# Patient Record
Sex: FEMALE | Race: WHITE | Employment: FULL TIME | URBAN - METROPOLITAN AREA
[De-identification: names, ages, dates, MRNs, and addresses within clinical notes are randomized per-mention and may not be internally consistent; named-entity substitution may affect disease eponyms.]

---

## 2019-08-16 ENCOUNTER — ROUTINE PRENATAL (OUTPATIENT)
Dept: OBGYN CLINIC | Age: 29
End: 2019-08-16
Payer: COMMERCIAL

## 2019-08-16 VITALS
HEART RATE: 93 BPM | WEIGHT: 202 LBS | SYSTOLIC BLOOD PRESSURE: 110 MMHG | HEIGHT: 60 IN | BODY MASS INDEX: 39.66 KG/M2 | DIASTOLIC BLOOD PRESSURE: 76 MMHG

## 2019-08-16 DIAGNOSIS — O35.5XX0 SUSPECTED DAMAGE TO FETUS FROM DRUGS, AFFECTING MANAGEMENT OF MOTHER, SINGLE OR UNSPECIFIED FETUS: ICD-10-CM

## 2019-08-16 DIAGNOSIS — O99.212 OBESITY AFFECTING PREGNANCY IN SECOND TRIMESTER: Primary | ICD-10-CM

## 2019-08-16 DIAGNOSIS — Z03.75 SUSPECTED SHORTENING OF CERVIX NOT FOUND: ICD-10-CM

## 2019-08-16 DIAGNOSIS — E03.9 HYPOTHYROID IN PREGNANCY, ANTEPARTUM, SECOND TRIMESTER: ICD-10-CM

## 2019-08-16 DIAGNOSIS — O99.282 HYPOTHYROID IN PREGNANCY, ANTEPARTUM, SECOND TRIMESTER: ICD-10-CM

## 2019-08-16 DIAGNOSIS — O99.342 DEPRESSION AFFECTING PREGNANCY IN SECOND TRIMESTER, ANTEPARTUM: ICD-10-CM

## 2019-08-16 DIAGNOSIS — Z36.89 ENCOUNTER FOR FETAL ANATOMIC SURVEY: ICD-10-CM

## 2019-08-16 DIAGNOSIS — Z3A.21 21 WEEKS GESTATION OF PREGNANCY: ICD-10-CM

## 2019-08-16 DIAGNOSIS — O35.EXX0 PYELECTASIS OF FETUS ON PRENATAL ULTRASOUND: ICD-10-CM

## 2019-08-16 DIAGNOSIS — F32.A DEPRESSION AFFECTING PREGNANCY IN SECOND TRIMESTER, ANTEPARTUM: ICD-10-CM

## 2019-08-16 LAB
GLUCOSE URINE, POC: NORMAL
PROTEIN UA: ABNORMAL

## 2019-08-16 PROCEDURE — 81002 URINALYSIS NONAUTO W/O SCOPE: CPT | Performed by: OBSTETRICS & GYNECOLOGY

## 2019-08-16 PROCEDURE — 99242 OFF/OP CONSLTJ NEW/EST SF 20: CPT | Performed by: OBSTETRICS & GYNECOLOGY

## 2019-08-16 PROCEDURE — 76817 TRANSVAGINAL US OBSTETRIC: CPT | Performed by: OBSTETRICS & GYNECOLOGY

## 2019-08-16 PROCEDURE — 76811 OB US DETAILED SNGL FETUS: CPT | Performed by: OBSTETRICS & GYNECOLOGY

## 2019-08-16 PROCEDURE — 99201 HC NEW PT, E/M LEVEL 1: CPT | Performed by: OBSTETRICS & GYNECOLOGY

## 2019-08-16 RX ORDER — CHOLECALCIFEROL (VITAMIN D3) 1250 MCG
1 CAPSULE ORAL WEEKLY
COMMUNITY
End: 2020-03-10

## 2019-08-16 RX ORDER — LEVOTHYROXINE SODIUM 175 UG/1
175 TABLET ORAL DAILY
COMMUNITY
End: 2020-03-10

## 2019-08-16 NOTE — PATIENT INSTRUCTIONS
5, 2018  Content Version: 12.1  © 8951-5230 [employer]. Care instructions adapted under license by [facility] ([facility]). If you have questions about a medical condition or this instruction, always ask your healthcare professional. [address] any warranty or liability for your use of this information.

## 2019-08-16 NOTE — PROGRESS NOTES
19    RE:  Brunilda Green   : 1990   AGE: 33 y.o. REFERRING PHYSICIAN:   Cisco Gil Keep    Dear Dr. Mervin Morrell you for referring Brunilda diaz 33 y.o.  who is seen today in our office. REASON FOR CONSULTATION:  · Consultation and comanagement of pregnant patient with ultrasound finding of pyelectasis in your office, and poor visualization of fetal intracranial structures, spine and heart. Mrs Brunilda Green gave the following history when I saw her today:    OB History    Para Term  AB Living   1 0 0 0 0 0   SAB TAB Ectopic Molar Multiple Live Births   0 0 0 0 0 0      # Outcome Date GA Lbr Antwan/2nd Weight Sex Delivery Anes PTL Lv   1 Current              PAST GYNECOLOGICAL  HISTORY:  Negative for abnormal pap smears requiring surgical treatment. Negative for sexually transmitted diseases. PAST MEDICAL HISTORY:  Past Medical History:   Diagnosis Date    Abnormal Pap smear of cervix     repeat without issue    Autoimmune disorder ([facility] [address]     hypothyroid    Depression     anxiety and depression    Thyroid disease     hypothyroid    Negative for Asthma or Heart disease. PAST SURGICAL HISTORY:  Past Surgical History:   Procedure Laterality Date    TYMPANOSTOMY TUBE PLACEMENT      WISDOM TOOTH EXTRACTION     Negative for Appendectomy, Cholecystectomy or surgery on the cervix such as LEEP, Cone or Cryotherapy. ALLERGIES:    Allergies   Allergen Reactions    Augmentin [Amoxicillin-Pot Clavulanate]      Also non narcotic pain medication     MEDICATIONS:    Prenatal Vitamins once per day   Synthroid 175 mcg p.o. per day. Zoloft 50 mg p.o. per day. Vitamin D 50,000 units once per week. SOCIAL  HISTORY: Denies smoking, Alcohol and Drug abuse.     REVIEW OF SYSTEMS:    CONSTITUTIONAL : No fever, no chills   HEENT :  No headache, no visual changes, no rhinorrhea, no sore throat   CARDIOVASCULAR :  No pain, no palpitations, no edema   RESPIRATORY :  No pain, no shortness of breath   GASTROINTESTINAL : No N/V, no D/C, no abdominal pain   GENITOURINARY :  No dysuria, hematuria and no incontinence   MUSCULOSKELETAL:  No myalgia, No back pain  NEUROLOGICAL :  No numbness, no tingling, no tremors. No history of seizures    FAMILY MEDICAL HISTORY:   Negative for birth defects, chromosomal anomalies and Mental retardation. OB Genetic Screening    Patient's Age 35+ at Date of Delivery No     Thalassemia MCV<80 No     Neural Tube Defect No     Congenital Heart Defect No     Down Syndrome No     Hayden-Sachs No     Sickle Cell Disease or Trait No     Hemophilia No     Muscular Dystrophy No     Cystic Fibrosis No     Amanda Chorea No     Mental Retardation/Autism No     Was Person Treated for Fragilex? No     Other Inherited Genetic Chromosomal Disorder? No     Maternal Metabolic Disorder No     Patient or [de-identified] Father Had Other Defects? No     Recurrent Pregnancy Loss or Still Birth? No      OB Infection History    High Risk Hepatitis B/Immunized? No     Live with Someone with or Exposed to TB? No     Patient or Partner has Hx of Genital Herpes? No     Rash or Viral Illness Since LMP? No     History of STD/GC/Chlamydia/HPV/Syphilis? No      Mrs Emmanuel Taveras had an uneventful course of pregnancy so far. When seen today in our office she had no complaints. PHYSICAL EXAMINATION:    General Appearance:  Healthy looking, alert, no acute distress. Eyes:     No pallor, no icterus, no photophobia. Ears:     No ear drainage. Nose:     No nasal drainage, no paranasal sinus tenderness. Throat:   Mucosa moist, no oral thrush, no exudate. Neck:     No nuchal rigidity. Back:     No CVA tenderness. Abdomen:    Soft nontender. Extremities:    No pretibial pitting edema, no calf muscle tenderness. Skin:     No rashes, no lesions. BP: 110/76 Weight: 202 lb (91.6 kg) Height: 5' (152.4 cm) Pulse: 93     Body mass index is 39.45 kg/m².   Urine

## 2019-10-27 ENCOUNTER — HOSPITAL ENCOUNTER (OUTPATIENT)
Age: 29
Setting detail: OBSERVATION
Discharge: HOME | End: 2019-10-27
Attending: OBSTETRICS & GYNECOLOGY | Admitting: OBSTETRICS & GYNECOLOGY
Payer: COMMERCIAL

## 2019-10-27 VITALS
DIASTOLIC BLOOD PRESSURE: 64 MMHG | HEART RATE: 95 BPM | RESPIRATION RATE: 16 BRPM | TEMPERATURE: 98.2 F | SYSTOLIC BLOOD PRESSURE: 93 MMHG

## 2019-10-27 PROBLEM — Z34.93 PREGNANT AND NOT YET DELIVERED IN THIRD TRIMESTER: Status: ACTIVE | Noted: 2019-10-27

## 2019-10-27 LAB
BACTERIA: ABNORMAL /HPF
BILIRUBIN URINE: NEGATIVE
BLOOD, URINE: NEGATIVE
CLARITY: ABNORMAL
COLOR: ABNORMAL
CRYSTALS, UA: ABNORMAL
GLUCOSE URINE: NEGATIVE MG/DL
KETONES, URINE: ABNORMAL MG/DL
LEUKOCYTE ESTERASE, URINE: ABNORMAL
NITRITE, URINE: NEGATIVE
PH UA: 7 (ref 5–9)
PROTEIN UA: ABNORMAL MG/DL
RBC UA: ABNORMAL /HPF (ref 0–2)
SPECIFIC GRAVITY UA: 1.02 (ref 1–1.03)
UROBILINOGEN, URINE: 1 E.U./DL
WBC UA: ABNORMAL /HPF (ref 0–5)

## 2019-10-27 PROCEDURE — 81001 URINALYSIS AUTO W/SCOPE: CPT

## 2019-10-27 PROCEDURE — 99202 OFFICE O/P NEW SF 15 MIN: CPT

## 2019-10-27 PROCEDURE — 87088 URINE BACTERIA CULTURE: CPT

## 2019-10-27 PROCEDURE — 6370000000 HC RX 637 (ALT 250 FOR IP): Performed by: OBSTETRICS & GYNECOLOGY

## 2019-10-27 PROCEDURE — G0378 HOSPITAL OBSERVATION PER HR: HCPCS

## 2019-10-27 RX ORDER — ACETAMINOPHEN 325 MG/1
650 TABLET ORAL EVERY 4 HOURS PRN
Status: DISCONTINUED | OUTPATIENT
Start: 2019-10-27 | End: 2019-10-28 | Stop reason: HOSPADM

## 2019-10-27 RX ADMIN — ACETAMINOPHEN 650 MG: 325 TABLET ORAL at 21:53

## 2019-10-27 ASSESSMENT — PAIN SCALES - GENERAL: PAINLEVEL_OUTOF10: 7

## 2019-10-29 LAB — URINE CULTURE, ROUTINE: NORMAL

## 2019-10-31 ENCOUNTER — ROUTINE PRENATAL (OUTPATIENT)
Dept: OBGYN CLINIC | Age: 29
End: 2019-10-31
Payer: COMMERCIAL

## 2019-10-31 VITALS
HEIGHT: 60 IN | DIASTOLIC BLOOD PRESSURE: 76 MMHG | HEART RATE: 98 BPM | SYSTOLIC BLOOD PRESSURE: 131 MMHG | WEIGHT: 211 LBS | BODY MASS INDEX: 41.43 KG/M2

## 2019-10-31 DIAGNOSIS — F32.A DEPRESSION AFFECTING PREGNANCY IN THIRD TRIMESTER, ANTEPARTUM: ICD-10-CM

## 2019-10-31 DIAGNOSIS — Z36.89 ENCOUNTER FOR FETAL ANATOMIC SURVEY: ICD-10-CM

## 2019-10-31 DIAGNOSIS — E03.9 HYPOTHYROID IN PREGNANCY, ANTEPARTUM, THIRD TRIMESTER: ICD-10-CM

## 2019-10-31 DIAGNOSIS — O35.5XX0 SUSPECTED DAMAGE TO FETUS FROM DRUGS, AFFECTING MANAGEMENT OF MOTHER, SINGLE OR UNSPECIFIED FETUS: ICD-10-CM

## 2019-10-31 DIAGNOSIS — Z3A.32 32 WEEKS GESTATION OF PREGNANCY: ICD-10-CM

## 2019-10-31 DIAGNOSIS — O99.213 MATERNAL OBESITY SYNDROME, ANTEPARTUM, THIRD TRIMESTER: Primary | ICD-10-CM

## 2019-10-31 DIAGNOSIS — O99.283 HYPOTHYROID IN PREGNANCY, ANTEPARTUM, THIRD TRIMESTER: ICD-10-CM

## 2019-10-31 DIAGNOSIS — O99.343 DEPRESSION AFFECTING PREGNANCY IN THIRD TRIMESTER, ANTEPARTUM: ICD-10-CM

## 2019-10-31 LAB
GLUCOSE URINE, POC: NEGATIVE
PROTEIN UA: POSITIVE

## 2019-10-31 PROCEDURE — 99213 OFFICE O/P EST LOW 20 MIN: CPT | Performed by: OBSTETRICS & GYNECOLOGY

## 2019-10-31 PROCEDURE — 76805 OB US >/= 14 WKS SNGL FETUS: CPT | Performed by: OBSTETRICS & GYNECOLOGY

## 2019-10-31 PROCEDURE — 81002 URINALYSIS NONAUTO W/O SCOPE: CPT | Performed by: OBSTETRICS & GYNECOLOGY

## 2019-10-31 PROCEDURE — 76819 FETAL BIOPHYS PROFIL W/O NST: CPT | Performed by: OBSTETRICS & GYNECOLOGY

## 2019-10-31 PROCEDURE — 99211 OFF/OP EST MAY X REQ PHY/QHP: CPT | Performed by: OBSTETRICS & GYNECOLOGY

## 2019-10-31 RX ORDER — LANOLIN ALCOHOL/MO/W.PET/CERES
CREAM (GRAM) TOPICAL
Refills: 3 | COMMUNITY
Start: 2019-10-14 | End: 2020-03-10

## 2019-12-02 ENCOUNTER — ROUTINE PRENATAL (OUTPATIENT)
Dept: OBGYN CLINIC | Age: 29
End: 2019-12-02
Payer: COMMERCIAL

## 2019-12-02 VITALS
WEIGHT: 218 LBS | BODY MASS INDEX: 42.8 KG/M2 | HEART RATE: 109 BPM | SYSTOLIC BLOOD PRESSURE: 126 MMHG | DIASTOLIC BLOOD PRESSURE: 85 MMHG | HEIGHT: 60 IN

## 2019-12-02 DIAGNOSIS — Z3A.36 36 WEEKS GESTATION OF PREGNANCY: ICD-10-CM

## 2019-12-02 DIAGNOSIS — E03.9 HYPOTHYROID IN PREGNANCY, ANTEPARTUM, THIRD TRIMESTER: ICD-10-CM

## 2019-12-02 DIAGNOSIS — O99.213 MATERNAL OBESITY SYNDROME, ANTEPARTUM, THIRD TRIMESTER: ICD-10-CM

## 2019-12-02 DIAGNOSIS — F32.A DEPRESSION AFFECTING PREGNANCY IN THIRD TRIMESTER, ANTEPARTUM: ICD-10-CM

## 2019-12-02 DIAGNOSIS — O99.343 DEPRESSION AFFECTING PREGNANCY IN THIRD TRIMESTER, ANTEPARTUM: ICD-10-CM

## 2019-12-02 DIAGNOSIS — O24.410 DIET CONTROLLED GESTATIONAL DIABETES MELLITUS (GDM), ANTEPARTUM: Primary | ICD-10-CM

## 2019-12-02 DIAGNOSIS — O35.5XX0 SUSPECTED DAMAGE TO FETUS FROM DRUGS, AFFECTING MANAGEMENT OF MOTHER, SINGLE OR UNSPECIFIED FETUS: ICD-10-CM

## 2019-12-02 DIAGNOSIS — O99.283 HYPOTHYROID IN PREGNANCY, ANTEPARTUM, THIRD TRIMESTER: ICD-10-CM

## 2019-12-02 LAB
GLUCOSE URINE, POC: NORMAL
PROTEIN UA: POSITIVE

## 2019-12-02 PROCEDURE — 76819 FETAL BIOPHYS PROFIL W/O NST: CPT | Performed by: OBSTETRICS & GYNECOLOGY

## 2019-12-02 PROCEDURE — 76816 OB US FOLLOW-UP PER FETUS: CPT | Performed by: OBSTETRICS & GYNECOLOGY

## 2019-12-02 PROCEDURE — 81002 URINALYSIS NONAUTO W/O SCOPE: CPT | Performed by: OBSTETRICS & GYNECOLOGY

## 2019-12-02 PROCEDURE — 99211 OFF/OP EST MAY X REQ PHY/QHP: CPT | Performed by: OBSTETRICS & GYNECOLOGY

## 2019-12-02 PROCEDURE — 99213 OFFICE O/P EST LOW 20 MIN: CPT | Performed by: OBSTETRICS & GYNECOLOGY

## 2019-12-18 ENCOUNTER — HOSPITAL ENCOUNTER (OUTPATIENT)
Age: 29
Discharge: HOME | End: 2019-12-18
Attending: OBSTETRICS & GYNECOLOGY | Admitting: OBSTETRICS & GYNECOLOGY
Payer: COMMERCIAL

## 2019-12-18 VITALS
RESPIRATION RATE: 16 BRPM | HEART RATE: 100 BPM | SYSTOLIC BLOOD PRESSURE: 118 MMHG | TEMPERATURE: 98.7 F | DIASTOLIC BLOOD PRESSURE: 65 MMHG

## 2019-12-18 PROBLEM — O16.3 ELEVATED BLOOD PRESSURE AFFECTING PREGNANCY IN THIRD TRIMESTER, ANTEPARTUM: Status: ACTIVE | Noted: 2019-12-18

## 2019-12-18 LAB
ABO/RH: NORMAL
ALBUMIN SERPL-MCNC: 3.6 G/DL (ref 3.5–5.2)
ALP BLD-CCNC: 109 U/L (ref 35–104)
ALT SERPL-CCNC: 7 U/L (ref 0–32)
ANION GAP SERPL CALCULATED.3IONS-SCNC: 12 MMOL/L (ref 7–16)
ANTIBODY SCREEN: NORMAL
AST SERPL-CCNC: 12 U/L (ref 0–31)
BACTERIA: ABNORMAL /HPF
BILIRUB SERPL-MCNC: 0.3 MG/DL (ref 0–1.2)
BILIRUBIN URINE: NEGATIVE
BLOOD, URINE: NEGATIVE
BUN BLDV-MCNC: 7 MG/DL (ref 6–20)
CALCIUM SERPL-MCNC: 9.3 MG/DL (ref 8.6–10.2)
CHLORIDE BLD-SCNC: 101 MMOL/L (ref 98–107)
CLARITY: CLEAR
CO2: 22 MMOL/L (ref 22–29)
COLOR: YELLOW
CREAT SERPL-MCNC: 0.7 MG/DL (ref 0.5–1)
CRYSTALS, UA: ABNORMAL
EPITHELIAL CELLS, UA: ABNORMAL /HPF
GFR AFRICAN AMERICAN: >60
GFR NON-AFRICAN AMERICAN: >60 ML/MIN/1.73
GLUCOSE BLD-MCNC: 93 MG/DL (ref 74–99)
GLUCOSE URINE: NEGATIVE MG/DL
HCT VFR BLD CALC: 33.7 % (ref 34–48)
HEMOGLOBIN: 10.7 G/DL (ref 11.5–15.5)
KETONES, URINE: ABNORMAL MG/DL
LEUKOCYTE ESTERASE, URINE: ABNORMAL
MCH RBC QN AUTO: 27.8 PG (ref 26–35)
MCHC RBC AUTO-ENTMCNC: 31.8 % (ref 32–34.5)
MCV RBC AUTO: 87.5 FL (ref 80–99.9)
METER GLUCOSE: 106 MG/DL (ref 74–99)
MUCUS: PRESENT
NITRITE, URINE: NEGATIVE
PDW BLD-RTO: 27.6 FL (ref 11.5–15)
PH UA: 6.5 (ref 5–9)
PLATELET # BLD: 268 E9/L (ref 130–450)
PMV BLD AUTO: 10.2 FL (ref 7–12)
POTASSIUM SERPL-SCNC: 4 MMOL/L (ref 3.5–5)
PROTEIN UA: ABNORMAL MG/DL
RBC # BLD: 3.85 E12/L (ref 3.5–5.5)
RBC UA: ABNORMAL /HPF (ref 0–2)
SODIUM BLD-SCNC: 135 MMOL/L (ref 132–146)
SPECIFIC GRAVITY UA: 1.02 (ref 1–1.03)
TOTAL PROTEIN: 6.9 G/DL (ref 6.4–8.3)
UROBILINOGEN, URINE: 0.2 E.U./DL
WBC # BLD: 10.9 E9/L (ref 4.5–11.5)
WBC UA: ABNORMAL /HPF (ref 0–5)

## 2019-12-18 PROCEDURE — 86850 RBC ANTIBODY SCREEN: CPT

## 2019-12-18 PROCEDURE — 99201 HC NEW PT, OUTPT VISIT LEVEL 1: CPT

## 2019-12-18 PROCEDURE — 36415 COLL VENOUS BLD VENIPUNCTURE: CPT

## 2019-12-18 PROCEDURE — 80053 COMPREHEN METABOLIC PANEL: CPT

## 2019-12-18 PROCEDURE — 86901 BLOOD TYPING SEROLOGIC RH(D): CPT

## 2019-12-18 PROCEDURE — 85027 COMPLETE CBC AUTOMATED: CPT

## 2019-12-18 PROCEDURE — 99213 OFFICE O/P EST LOW 20 MIN: CPT | Performed by: NURSE PRACTITIONER

## 2019-12-18 PROCEDURE — 81001 URINALYSIS AUTO W/SCOPE: CPT

## 2019-12-18 PROCEDURE — 82962 GLUCOSE BLOOD TEST: CPT

## 2019-12-18 PROCEDURE — 86900 BLOOD TYPING SEROLOGIC ABO: CPT

## 2019-12-19 ENCOUNTER — ANESTHESIA (OUTPATIENT)
Dept: LABOR AND DELIVERY | Age: 29
End: 2019-12-19
Payer: COMMERCIAL

## 2019-12-19 ENCOUNTER — APPOINTMENT (OUTPATIENT)
Dept: LABOR AND DELIVERY | Age: 29
End: 2019-12-19
Payer: COMMERCIAL

## 2019-12-19 ENCOUNTER — ANESTHESIA EVENT (OUTPATIENT)
Dept: LABOR AND DELIVERY | Age: 29
End: 2019-12-19
Payer: COMMERCIAL

## 2019-12-19 ENCOUNTER — HOSPITAL ENCOUNTER (INPATIENT)
Age: 29
LOS: 4 days | Discharge: HOME | End: 2019-12-23
Attending: OBSTETRICS & GYNECOLOGY | Admitting: OBSTETRICS & GYNECOLOGY
Payer: COMMERCIAL

## 2019-12-19 DIAGNOSIS — G89.18 POSTOPERATIVE PAIN: Primary | ICD-10-CM

## 2019-12-19 LAB
ALBUMIN SERPL-MCNC: 3.3 G/DL (ref 3.5–5.2)
ALP BLD-CCNC: 105 U/L (ref 35–104)
ALT SERPL-CCNC: 8 U/L (ref 0–32)
AMPHETAMINE SCREEN, URINE: NOT DETECTED
ANION GAP SERPL CALCULATED.3IONS-SCNC: 10 MMOL/L (ref 7–16)
AST SERPL-CCNC: 12 U/L (ref 0–31)
BARBITURATE SCREEN URINE: NOT DETECTED
BENZODIAZEPINE SCREEN, URINE: NOT DETECTED
BILIRUB SERPL-MCNC: 0.2 MG/DL (ref 0–1.2)
BILIRUBIN URINE: NEGATIVE
BLOOD, URINE: NEGATIVE
BUN BLDV-MCNC: 6 MG/DL (ref 6–20)
CALCIUM SERPL-MCNC: 9 MG/DL (ref 8.6–10.2)
CANNABINOID SCREEN URINE: NOT DETECTED
CHLORIDE BLD-SCNC: 103 MMOL/L (ref 98–107)
CLARITY: CLEAR
CO2: 22 MMOL/L (ref 22–29)
COCAINE METABOLITE SCREEN URINE: NOT DETECTED
COLOR: YELLOW
CREAT SERPL-MCNC: 0.6 MG/DL (ref 0.5–1)
FENTANYL SCREEN, URINE: NOT DETECTED
GFR AFRICAN AMERICAN: >60
GFR NON-AFRICAN AMERICAN: >60 ML/MIN/1.73
GLUCOSE BLD-MCNC: 76 MG/DL (ref 74–99)
GLUCOSE URINE: NEGATIVE MG/DL
HCT VFR BLD CALC: 31.6 % (ref 34–48)
HEMOGLOBIN: 9.9 G/DL (ref 11.5–15.5)
KETONES, URINE: ABNORMAL MG/DL
LEUKOCYTE ESTERASE, URINE: NEGATIVE
Lab: NORMAL
MCH RBC QN AUTO: 27.7 PG (ref 26–35)
MCHC RBC AUTO-ENTMCNC: 31.3 % (ref 32–34.5)
MCV RBC AUTO: 88.3 FL (ref 80–99.9)
METHADONE SCREEN, URINE: NOT DETECTED
NITRITE, URINE: NEGATIVE
OPIATE SCREEN URINE: NOT DETECTED
OXYCODONE URINE: NOT DETECTED
PDW BLD-RTO: ABNORMAL FL (ref 11.5–15)
PH UA: 6.5 (ref 5–9)
PHENCYCLIDINE SCREEN URINE: NOT DETECTED
PLATELET # BLD: 252 E9/L (ref 130–450)
PMV BLD AUTO: 10.5 FL (ref 7–12)
POTASSIUM SERPL-SCNC: 3.9 MMOL/L (ref 3.5–5)
PROTEIN UA: NEGATIVE MG/DL
RBC # BLD: 3.58 E12/L (ref 3.5–5.5)
SODIUM BLD-SCNC: 135 MMOL/L (ref 132–146)
SPECIFIC GRAVITY UA: 1.02 (ref 1–1.03)
TOTAL PROTEIN: 6.4 G/DL (ref 6.4–8.3)
URIC ACID, SERUM: 4.2 MG/DL (ref 2.4–5.7)
UROBILINOGEN, URINE: 0.2 E.U./DL
WBC # BLD: 10.8 E9/L (ref 4.5–11.5)

## 2019-12-19 PROCEDURE — 81003 URINALYSIS AUTO W/O SCOPE: CPT

## 2019-12-19 PROCEDURE — 1220000001 HC SEMI PRIVATE L&D R&B

## 2019-12-19 PROCEDURE — 80053 COMPREHEN METABOLIC PANEL: CPT

## 2019-12-19 PROCEDURE — 36415 COLL VENOUS BLD VENIPUNCTURE: CPT

## 2019-12-19 PROCEDURE — 2580000003 HC RX 258: Performed by: OBSTETRICS & GYNECOLOGY

## 2019-12-19 PROCEDURE — 84550 ASSAY OF BLOOD/URIC ACID: CPT

## 2019-12-19 PROCEDURE — 85027 COMPLETE CBC AUTOMATED: CPT

## 2019-12-19 PROCEDURE — 80307 DRUG TEST PRSMV CHEM ANLYZR: CPT

## 2019-12-19 PROCEDURE — 6370000000 HC RX 637 (ALT 250 FOR IP): Performed by: OBSTETRICS & GYNECOLOGY

## 2019-12-19 RX ORDER — SODIUM CHLORIDE, SODIUM LACTATE, POTASSIUM CHLORIDE, CALCIUM CHLORIDE 600; 310; 30; 20 MG/100ML; MG/100ML; MG/100ML; MG/100ML
INJECTION, SOLUTION INTRAVENOUS CONTINUOUS
Status: DISCONTINUED | OUTPATIENT
Start: 2019-12-19 | End: 2019-12-23 | Stop reason: HOSPADM

## 2019-12-19 RX ADMIN — DINOPROSTONE 10 MG: 10 INSERT VAGINAL at 13:35

## 2019-12-19 RX ADMIN — SODIUM CHLORIDE, POTASSIUM CHLORIDE, SODIUM LACTATE AND CALCIUM CHLORIDE: 600; 310; 30; 20 INJECTION, SOLUTION INTRAVENOUS at 13:10

## 2019-12-20 VITALS — OXYGEN SATURATION: 98 % | SYSTOLIC BLOOD PRESSURE: 97 MMHG | DIASTOLIC BLOOD PRESSURE: 50 MMHG

## 2019-12-20 PROCEDURE — 7100000001 HC PACU RECOVERY - ADDTL 15 MIN: Performed by: OBSTETRICS & GYNECOLOGY

## 2019-12-20 PROCEDURE — 3700000025 EPIDURAL BLOCK: Performed by: ANESTHESIOLOGY

## 2019-12-20 PROCEDURE — 3609079900 HC CESAREAN SECTION: Performed by: OBSTETRICS & GYNECOLOGY

## 2019-12-20 PROCEDURE — 7100000000 HC PACU RECOVERY - FIRST 15 MIN: Performed by: OBSTETRICS & GYNECOLOGY

## 2019-12-20 PROCEDURE — 3700000000 HC ANESTHESIA ATTENDED CARE: Performed by: OBSTETRICS & GYNECOLOGY

## 2019-12-20 PROCEDURE — 6360000002 HC RX W HCPCS: Performed by: OBSTETRICS & GYNECOLOGY

## 2019-12-20 PROCEDURE — 2500000003 HC RX 250 WO HCPCS: Performed by: NURSE ANESTHETIST, CERTIFIED REGISTERED

## 2019-12-20 PROCEDURE — 2500000003 HC RX 250 WO HCPCS: Performed by: ANESTHESIOLOGY

## 2019-12-20 PROCEDURE — 2580000003 HC RX 258: Performed by: OBSTETRICS & GYNECOLOGY

## 2019-12-20 PROCEDURE — 2780000010 HC IMPLANT OTHER: Performed by: OBSTETRICS & GYNECOLOGY

## 2019-12-20 PROCEDURE — 2580000003 HC RX 258: Performed by: NURSE ANESTHETIST, CERTIFIED REGISTERED

## 2019-12-20 PROCEDURE — 6370000000 HC RX 637 (ALT 250 FOR IP)

## 2019-12-20 PROCEDURE — 2709999900 HC NON-CHARGEABLE SUPPLY: Performed by: OBSTETRICS & GYNECOLOGY

## 2019-12-20 PROCEDURE — 6360000002 HC RX W HCPCS: Performed by: NURSE ANESTHETIST, CERTIFIED REGISTERED

## 2019-12-20 PROCEDURE — 94761 N-INVAS EAR/PLS OXIMETRY MLT: CPT

## 2019-12-20 PROCEDURE — 51701 INSERT BLADDER CATHETER: CPT

## 2019-12-20 PROCEDURE — 2500000003 HC RX 250 WO HCPCS

## 2019-12-20 PROCEDURE — 1220000000 HC SEMI PRIVATE OB R&B

## 2019-12-20 PROCEDURE — 3700000001 HC ADD 15 MINUTES (ANESTHESIA): Performed by: OBSTETRICS & GYNECOLOGY

## 2019-12-20 RX ORDER — LIDOCAINE HYDROCHLORIDE 10 MG/ML
INJECTION, SOLUTION INFILTRATION; PERINEURAL
Status: DISPENSED
Start: 2019-12-20 | End: 2019-12-20

## 2019-12-20 RX ORDER — CLINDAMYCIN PHOSPHATE 900 MG/50ML
INJECTION INTRAVENOUS
Status: COMPLETED
Start: 2019-12-20 | End: 2019-12-20

## 2019-12-20 RX ORDER — HYDROCODONE BITARTRATE AND ACETAMINOPHEN 5; 325 MG/1; MG/1
1 TABLET ORAL EVERY 4 HOURS PRN
Status: DISCONTINUED | OUTPATIENT
Start: 2019-12-21 | End: 2019-12-23 | Stop reason: HOSPADM

## 2019-12-20 RX ORDER — ACETAMINOPHEN 325 MG/1
650 TABLET ORAL EVERY 4 HOURS PRN
Status: DISCONTINUED | OUTPATIENT
Start: 2019-12-20 | End: 2019-12-23 | Stop reason: HOSPADM

## 2019-12-20 RX ORDER — SODIUM CHLORIDE 0.9 % (FLUSH) 0.9 %
10 SYRINGE (ML) INJECTION PRN
Status: DISCONTINUED | OUTPATIENT
Start: 2019-12-20 | End: 2019-12-23 | Stop reason: HOSPADM

## 2019-12-20 RX ORDER — DOCUSATE SODIUM 100 MG/1
100 CAPSULE, LIQUID FILLED ORAL 2 TIMES DAILY
Status: DISCONTINUED | OUTPATIENT
Start: 2019-12-20 | End: 2019-12-23 | Stop reason: HOSPADM

## 2019-12-20 RX ORDER — ACETAMINOPHEN 650 MG
TABLET, EXTENDED RELEASE ORAL
Status: DISPENSED
Start: 2019-12-20 | End: 2019-12-20

## 2019-12-20 RX ORDER — ONDANSETRON 2 MG/ML
4 INJECTION INTRAMUSCULAR; INTRAVENOUS EVERY 6 HOURS PRN
Status: DISCONTINUED | OUTPATIENT
Start: 2019-12-20 | End: 2019-12-20 | Stop reason: HOSPADM

## 2019-12-20 RX ORDER — IBUPROFEN 800 MG/1
800 TABLET ORAL EVERY 8 HOURS
Status: DISCONTINUED | OUTPATIENT
Start: 2019-12-22 | End: 2019-12-22

## 2019-12-20 RX ORDER — TRISODIUM CITRATE DIHYDRATE AND CITRIC ACID MONOHYDRATE 500; 334 MG/5ML; MG/5ML
30 SOLUTION ORAL ONCE
Status: COMPLETED | OUTPATIENT
Start: 2019-12-20 | End: 2019-12-20

## 2019-12-20 RX ORDER — KETOROLAC TROMETHAMINE 30 MG/ML
30 INJECTION, SOLUTION INTRAMUSCULAR; INTRAVENOUS EVERY 6 HOURS
Status: DISCONTINUED | OUTPATIENT
Start: 2019-12-21 | End: 2019-12-22

## 2019-12-20 RX ORDER — SODIUM CHLORIDE, SODIUM LACTATE, POTASSIUM CHLORIDE, CALCIUM CHLORIDE 600; 310; 30; 20 MG/100ML; MG/100ML; MG/100ML; MG/100ML
INJECTION, SOLUTION INTRAVENOUS CONTINUOUS
Status: DISCONTINUED | OUTPATIENT
Start: 2019-12-20 | End: 2019-12-23 | Stop reason: HOSPADM

## 2019-12-20 RX ORDER — LEVOTHYROXINE SODIUM 175 UG/1
175 TABLET ORAL DAILY
Status: DISCONTINUED | OUTPATIENT
Start: 2019-12-21 | End: 2019-12-20 | Stop reason: SDUPTHER

## 2019-12-20 RX ORDER — LEVOTHYROXINE SODIUM 0.12 MG/1
125 TABLET ORAL DAILY
Status: DISCONTINUED | OUTPATIENT
Start: 2019-12-21 | End: 2019-12-23 | Stop reason: HOSPADM

## 2019-12-20 RX ORDER — TRISODIUM CITRATE DIHYDRATE AND CITRIC ACID MONOHYDRATE 500; 334 MG/5ML; MG/5ML
SOLUTION ORAL
Status: COMPLETED
Start: 2019-12-20 | End: 2019-12-20

## 2019-12-20 RX ORDER — KETOROLAC TROMETHAMINE 30 MG/ML
15 INJECTION, SOLUTION INTRAMUSCULAR; INTRAVENOUS EVERY 6 HOURS PRN
Status: DISPENSED | OUTPATIENT
Start: 2019-12-20 | End: 2019-12-21

## 2019-12-20 RX ORDER — KETOROLAC TROMETHAMINE 30 MG/ML
INJECTION, SOLUTION INTRAMUSCULAR; INTRAVENOUS PRN
Status: DISCONTINUED | OUTPATIENT
Start: 2019-12-20 | End: 2019-12-20 | Stop reason: SDUPTHER

## 2019-12-20 RX ORDER — SODIUM CHLORIDE, SODIUM LACTATE, POTASSIUM CHLORIDE, AND CALCIUM CHLORIDE .6; .31; .03; .02 G/100ML; G/100ML; G/100ML; G/100ML
1000 INJECTION, SOLUTION INTRAVENOUS ONCE
Status: DISCONTINUED | OUTPATIENT
Start: 2019-12-20 | End: 2019-12-23 | Stop reason: HOSPADM

## 2019-12-20 RX ORDER — OXYCODONE HYDROCHLORIDE 5 MG/1
5 TABLET ORAL EVERY 4 HOURS PRN
Status: DISPENSED | OUTPATIENT
Start: 2019-12-20 | End: 2019-12-21

## 2019-12-20 RX ORDER — DIPHENHYDRAMINE HYDROCHLORIDE 50 MG/ML
25 INJECTION INTRAMUSCULAR; INTRAVENOUS EVERY 6 HOURS PRN
Status: DISCONTINUED | OUTPATIENT
Start: 2019-12-20 | End: 2019-12-23 | Stop reason: HOSPADM

## 2019-12-20 RX ORDER — CLINDAMYCIN PHOSPHATE 900 MG/50ML
900 INJECTION INTRAVENOUS
Status: COMPLETED | OUTPATIENT
Start: 2019-12-20 | End: 2019-12-20

## 2019-12-20 RX ORDER — NALOXONE HYDROCHLORIDE 0.4 MG/ML
0.4 INJECTION, SOLUTION INTRAMUSCULAR; INTRAVENOUS; SUBCUTANEOUS PRN
Status: DISCONTINUED | OUTPATIENT
Start: 2019-12-20 | End: 2019-12-20 | Stop reason: HOSPADM

## 2019-12-20 RX ORDER — NALBUPHINE HCL 10 MG/ML
5 AMPUL (ML) INJECTION EVERY 4 HOURS PRN
Status: ACTIVE | OUTPATIENT
Start: 2019-12-20 | End: 2019-12-21

## 2019-12-20 RX ORDER — LANOLIN 100 %
OINTMENT (GRAM) TOPICAL
Status: DISCONTINUED | OUTPATIENT
Start: 2019-12-20 | End: 2019-12-23 | Stop reason: HOSPADM

## 2019-12-20 RX ORDER — HYDROCODONE BITARTRATE AND ACETAMINOPHEN 5; 325 MG/1; MG/1
2 TABLET ORAL EVERY 4 HOURS PRN
Status: DISCONTINUED | OUTPATIENT
Start: 2019-12-21 | End: 2019-12-23 | Stop reason: HOSPADM

## 2019-12-20 RX ORDER — HYDROCODONE BITARTRATE AND ACETAMINOPHEN 5; 325 MG/1; MG/1
1 TABLET ORAL EVERY 4 HOURS PRN
Status: DISCONTINUED | OUTPATIENT
Start: 2019-12-20 | End: 2019-12-20

## 2019-12-20 RX ORDER — IBUPROFEN 800 MG/1
800 TABLET ORAL EVERY 8 HOURS
Status: DISCONTINUED | OUTPATIENT
Start: 2019-12-21 | End: 2019-12-20

## 2019-12-20 RX ORDER — SODIUM CHLORIDE 0.9 % (FLUSH) 0.9 %
10 SYRINGE (ML) INJECTION EVERY 12 HOURS SCHEDULED
Status: DISCONTINUED | OUTPATIENT
Start: 2019-12-20 | End: 2019-12-23 | Stop reason: HOSPADM

## 2019-12-20 RX ORDER — OXYCODONE HYDROCHLORIDE 5 MG/1
10 TABLET ORAL EVERY 4 HOURS PRN
Status: ACTIVE | OUTPATIENT
Start: 2019-12-20 | End: 2019-12-21

## 2019-12-20 RX ORDER — ERGOCALCIFEROL 1.25 MG/1
50000 CAPSULE ORAL WEEKLY
Status: DISCONTINUED | OUTPATIENT
Start: 2019-12-26 | End: 2019-12-23 | Stop reason: HOSPADM

## 2019-12-20 RX ORDER — ACETAMINOPHEN 325 MG/1
325 TABLET ORAL EVERY 4 HOURS PRN
Status: DISCONTINUED | OUTPATIENT
Start: 2019-12-20 | End: 2019-12-23 | Stop reason: HOSPADM

## 2019-12-20 RX ORDER — NALOXONE HYDROCHLORIDE 0.4 MG/ML
0.4 INJECTION, SOLUTION INTRAMUSCULAR; INTRAVENOUS; SUBCUTANEOUS PRN
Status: DISCONTINUED | OUTPATIENT
Start: 2019-12-20 | End: 2019-12-23 | Stop reason: HOSPADM

## 2019-12-20 RX ORDER — EPHEDRINE SULFATE/0.9% NACL/PF 50 MG/5 ML
5 SYRINGE (ML) INTRAVENOUS PRN
Status: DISCONTINUED | OUTPATIENT
Start: 2019-12-20 | End: 2019-12-23 | Stop reason: HOSPADM

## 2019-12-20 RX ORDER — BISACODYL 10 MG
10 SUPPOSITORY, RECTAL RECTAL DAILY PRN
Status: DISCONTINUED | OUTPATIENT
Start: 2019-12-20 | End: 2019-12-23 | Stop reason: HOSPADM

## 2019-12-20 RX ORDER — ONDANSETRON 2 MG/ML
4 INJECTION INTRAMUSCULAR; INTRAVENOUS EVERY 6 HOURS PRN
Status: DISCONTINUED | OUTPATIENT
Start: 2019-12-20 | End: 2019-12-23 | Stop reason: HOSPADM

## 2019-12-20 RX ORDER — SIMETHICONE 80 MG
80 TABLET,CHEWABLE ORAL EVERY 6 HOURS PRN
Status: DISCONTINUED | OUTPATIENT
Start: 2019-12-20 | End: 2019-12-23 | Stop reason: HOSPADM

## 2019-12-20 RX ORDER — HYDROCODONE BITARTRATE AND ACETAMINOPHEN 5; 325 MG/1; MG/1
2 TABLET ORAL EVERY 4 HOURS PRN
Status: DISCONTINUED | OUTPATIENT
Start: 2019-12-20 | End: 2019-12-20

## 2019-12-20 RX ORDER — ONDANSETRON 4 MG/1
8 TABLET, FILM COATED ORAL EVERY 8 HOURS PRN
Status: DISCONTINUED | OUTPATIENT
Start: 2019-12-20 | End: 2019-12-23 | Stop reason: HOSPADM

## 2019-12-20 RX ORDER — LEVOTHYROXINE SODIUM 0.05 MG/1
50 TABLET ORAL DAILY
Status: DISCONTINUED | OUTPATIENT
Start: 2019-12-21 | End: 2019-12-23 | Stop reason: HOSPADM

## 2019-12-20 RX ORDER — PRENATAL WITH FERROUS FUM AND FOLIC ACID 3080; 920; 120; 400; 22; 1.84; 3; 20; 10; 1; 12; 200; 27; 25; 2 [IU]/1; [IU]/1; MG/1; [IU]/1; MG/1; MG/1; MG/1; MG/1; MG/1; MG/1; UG/1; MG/1; MG/1; MG/1; MG/1
1 TABLET ORAL DAILY
Status: DISCONTINUED | OUTPATIENT
Start: 2019-12-20 | End: 2019-12-23 | Stop reason: HOSPADM

## 2019-12-20 RX ORDER — FENTANYL CITRATE 50 UG/ML
INJECTION, SOLUTION INTRAMUSCULAR; INTRAVENOUS PRN
Status: DISCONTINUED | OUTPATIENT
Start: 2019-12-20 | End: 2019-12-20 | Stop reason: SDUPTHER

## 2019-12-20 RX ORDER — SODIUM CHLORIDE, SODIUM LACTATE, POTASSIUM CHLORIDE, CALCIUM CHLORIDE 600; 310; 30; 20 MG/100ML; MG/100ML; MG/100ML; MG/100ML
INJECTION, SOLUTION INTRAVENOUS CONTINUOUS PRN
Status: DISCONTINUED | OUTPATIENT
Start: 2019-12-20 | End: 2019-12-20 | Stop reason: SDUPTHER

## 2019-12-20 RX ORDER — FERROUS SULFATE 325(65) MG
325 TABLET ORAL 2 TIMES DAILY WITH MEALS
Status: DISCONTINUED | OUTPATIENT
Start: 2019-12-20 | End: 2019-12-23 | Stop reason: HOSPADM

## 2019-12-20 RX ORDER — LIDOCAINE HYDROCHLORIDE AND EPINEPHRINE 20; 5 MG/ML; UG/ML
INJECTION, SOLUTION EPIDURAL; INFILTRATION; INTRACAUDAL; PERINEURAL PRN
Status: DISCONTINUED | OUTPATIENT
Start: 2019-12-20 | End: 2019-12-20 | Stop reason: SDUPTHER

## 2019-12-20 RX ORDER — MORPHINE SULFATE 1 MG/ML
INJECTION, SOLUTION EPIDURAL; INTRATHECAL; INTRAVENOUS PRN
Status: DISCONTINUED | OUTPATIENT
Start: 2019-12-20 | End: 2019-12-20 | Stop reason: SDUPTHER

## 2019-12-20 RX ORDER — NALBUPHINE HCL 10 MG/ML
5 AMPUL (ML) INJECTION EVERY 4 HOURS PRN
Status: DISCONTINUED | OUTPATIENT
Start: 2019-12-20 | End: 2019-12-20 | Stop reason: HOSPADM

## 2019-12-20 RX ORDER — ONDANSETRON 2 MG/ML
INJECTION INTRAMUSCULAR; INTRAVENOUS PRN
Status: DISCONTINUED | OUTPATIENT
Start: 2019-12-20 | End: 2019-12-20 | Stop reason: SDUPTHER

## 2019-12-20 RX ORDER — KETOROLAC TROMETHAMINE 30 MG/ML
30 INJECTION, SOLUTION INTRAMUSCULAR; INTRAVENOUS EVERY 6 HOURS
Status: DISCONTINUED | OUTPATIENT
Start: 2019-12-20 | End: 2019-12-20

## 2019-12-20 RX ADMIN — LIDOCAINE HYDROCHLORIDE,EPINEPHRINE BITARTRATE 5 ML: 20; .005 INJECTION, SOLUTION EPIDURAL; INFILTRATION; INTRACAUDAL; PERINEURAL at 17:08

## 2019-12-20 RX ADMIN — FENTANYL CITRATE 50 MCG: 50 INJECTION, SOLUTION INTRAMUSCULAR; INTRAVENOUS at 17:14

## 2019-12-20 RX ADMIN — SODIUM CHLORIDE, POTASSIUM CHLORIDE, SODIUM LACTATE AND CALCIUM CHLORIDE: 600; 310; 30; 20 INJECTION, SOLUTION INTRAVENOUS at 15:27

## 2019-12-20 RX ADMIN — Medication 4 ML: at 12:50

## 2019-12-20 RX ADMIN — Medication 15 ML/HR: at 12:56

## 2019-12-20 RX ADMIN — Medication 999 ML/HR: at 17:45

## 2019-12-20 RX ADMIN — CLINDAMYCIN PHOSPHATE 900 MG: 900 INJECTION, SOLUTION INTRAVENOUS at 16:40

## 2019-12-20 RX ADMIN — PHENYLEPHRINE HYDROCHLORIDE 100 MCG: 10 INJECTION INTRAVENOUS at 17:28

## 2019-12-20 RX ADMIN — SODIUM CHLORIDE, POTASSIUM CHLORIDE, SODIUM LACTATE AND CALCIUM CHLORIDE: 600; 310; 30; 20 INJECTION, SOLUTION INTRAVENOUS at 09:04

## 2019-12-20 RX ADMIN — PHENYLEPHRINE HYDROCHLORIDE 100 MCG: 10 INJECTION INTRAVENOUS at 17:39

## 2019-12-20 RX ADMIN — PHENYLEPHRINE HYDROCHLORIDE 200 MCG: 10 INJECTION INTRAVENOUS at 18:00

## 2019-12-20 RX ADMIN — TRISODIUM CITRATE DIHYDRATE AND CITRIC ACID MONOHYDRATE 30 ML: 500; 334 SOLUTION ORAL at 16:39

## 2019-12-20 RX ADMIN — PHENYLEPHRINE HYDROCHLORIDE 200 MCG: 10 INJECTION INTRAVENOUS at 18:04

## 2019-12-20 RX ADMIN — SODIUM CITRATE AND CITRIC ACID MONOHYDRATE 30 ML: 500; 334 SOLUTION ORAL at 16:39

## 2019-12-20 RX ADMIN — LIDOCAINE HYDROCHLORIDE,EPINEPHRINE BITARTRATE 5 ML: 20; .005 INJECTION, SOLUTION EPIDURAL; INFILTRATION; INTRACAUDAL; PERINEURAL at 17:16

## 2019-12-20 RX ADMIN — Medication 1 MILLI-UNITS/MIN: at 05:16

## 2019-12-20 RX ADMIN — PHENYLEPHRINE HYDROCHLORIDE 100 MCG: 10 INJECTION INTRAVENOUS at 18:08

## 2019-12-20 RX ADMIN — FENTANYL CITRATE 50 MCG: 50 INJECTION, SOLUTION INTRAMUSCULAR; INTRAVENOUS at 17:00

## 2019-12-20 RX ADMIN — SODIUM CHLORIDE, POTASSIUM CHLORIDE, SODIUM LACTATE AND CALCIUM CHLORIDE: 600; 310; 30; 20 INJECTION, SOLUTION INTRAVENOUS at 17:17

## 2019-12-20 RX ADMIN — PHENYLEPHRINE HYDROCHLORIDE 100 MCG: 10 INJECTION INTRAVENOUS at 17:51

## 2019-12-20 RX ADMIN — SODIUM CHLORIDE, POTASSIUM CHLORIDE, SODIUM LACTATE AND CALCIUM CHLORIDE: 600; 310; 30; 20 INJECTION, SOLUTION INTRAVENOUS at 18:28

## 2019-12-20 RX ADMIN — PHENYLEPHRINE HYDROCHLORIDE 100 MCG: 10 INJECTION INTRAVENOUS at 17:54

## 2019-12-20 RX ADMIN — PHENYLEPHRINE HYDROCHLORIDE 100 MCG: 10 INJECTION INTRAVENOUS at 17:57

## 2019-12-20 RX ADMIN — MORPHINE SULFATE 3 MG: 1 INJECTION, SOLUTION EPIDURAL; INTRATHECAL; INTRAVENOUS at 17:47

## 2019-12-20 RX ADMIN — Medication 2 ML: at 12:55

## 2019-12-20 RX ADMIN — KETOROLAC TROMETHAMINE 30 MG: 30 INJECTION, SOLUTION INTRAMUSCULAR; INTRAVENOUS at 18:07

## 2019-12-20 RX ADMIN — CLINDAMYCIN PHOSPHATE 900 MG: 900 INJECTION INTRAVENOUS at 16:40

## 2019-12-20 RX ADMIN — LIDOCAINE HYDROCHLORIDE,EPINEPHRINE BITARTRATE 5 ML: 20; .005 INJECTION, SOLUTION EPIDURAL; INFILTRATION; INTRACAUDAL; PERINEURAL at 17:11

## 2019-12-20 RX ADMIN — GENTAMICIN SULFATE 480 MG: 40 INJECTION, SOLUTION INTRAMUSCULAR; INTRAVENOUS at 17:11

## 2019-12-20 RX ADMIN — BUTORPHANOL TARTRATE 1 MG: 2 INJECTION, SOLUTION INTRAMUSCULAR; INTRAVENOUS at 09:00

## 2019-12-20 RX ADMIN — BUTORPHANOL TARTRATE 2 MG: 2 INJECTION, SOLUTION INTRAMUSCULAR; INTRAVENOUS at 12:09

## 2019-12-20 RX ADMIN — LIDOCAINE HYDROCHLORIDE,EPINEPHRINE BITARTRATE 3 ML: 20; .005 INJECTION, SOLUTION EPIDURAL; INFILTRATION; INTRACAUDAL; PERINEURAL at 17:20

## 2019-12-20 RX ADMIN — ONDANSETRON HYDROCHLORIDE 4 MG: 2 INJECTION, SOLUTION INTRAMUSCULAR; INTRAVENOUS at 17:45

## 2019-12-20 ASSESSMENT — PAIN DESCRIPTION - PAIN TYPE
TYPE: ACUTE PAIN
TYPE: ACUTE PAIN

## 2019-12-20 ASSESSMENT — PULMONARY FUNCTION TESTS
PIF_VALUE: 0

## 2019-12-20 ASSESSMENT — PAIN DESCRIPTION - FREQUENCY
FREQUENCY: CONTINUOUS
FREQUENCY: CONTINUOUS

## 2019-12-20 ASSESSMENT — PAIN SCALES - GENERAL
PAINLEVEL_OUTOF10: 4
PAINLEVEL_OUTOF10: 8
PAINLEVEL_OUTOF10: 9
PAINLEVEL_OUTOF10: 6
PAINLEVEL_OUTOF10: 2

## 2019-12-20 ASSESSMENT — PAIN DESCRIPTION - ORIENTATION
ORIENTATION: LOWER
ORIENTATION: LOWER

## 2019-12-20 ASSESSMENT — PAIN DESCRIPTION - PROGRESSION
CLINICAL_PROGRESSION: NOT CHANGED
CLINICAL_PROGRESSION: NOT CHANGED

## 2019-12-20 ASSESSMENT — PAIN DESCRIPTION - DESCRIPTORS
DESCRIPTORS: CRAMPING;DISCOMFORT;PRESSURE
DESCRIPTORS: PRESSURE;DISCOMFORT;CRAMPING

## 2019-12-20 ASSESSMENT — PAIN DESCRIPTION - LOCATION
LOCATION: VAGINA
LOCATION: VAGINA;ABDOMEN

## 2019-12-20 ASSESSMENT — PAIN DESCRIPTION - ONSET
ONSET: ON-GOING
ONSET: ON-GOING

## 2019-12-20 ASSESSMENT — PAIN - FUNCTIONAL ASSESSMENT: PAIN_FUNCTIONAL_ASSESSMENT: PREVENTS OR INTERFERES SOME ACTIVE ACTIVITIES AND ADLS

## 2019-12-21 LAB
HCT VFR BLD CALC: 27.4 % (ref 34–48)
HEMOGLOBIN: 8.6 G/DL (ref 11.5–15.5)
MCH RBC QN AUTO: 27.8 PG (ref 26–35)
MCHC RBC AUTO-ENTMCNC: 31.4 % (ref 32–34.5)
MCV RBC AUTO: 88.7 FL (ref 80–99.9)
PDW BLD-RTO: ABNORMAL FL (ref 11.5–15)
PLATELET # BLD: 228 E9/L (ref 130–450)
PMV BLD AUTO: 10.6 FL (ref 7–12)
RBC # BLD: 3.09 E12/L (ref 3.5–5.5)
WBC # BLD: 13.9 E9/L (ref 4.5–11.5)

## 2019-12-21 PROCEDURE — 36415 COLL VENOUS BLD VENIPUNCTURE: CPT

## 2019-12-21 PROCEDURE — 6370000000 HC RX 637 (ALT 250 FOR IP): Performed by: OBSTETRICS & GYNECOLOGY

## 2019-12-21 PROCEDURE — 2580000003 HC RX 258: Performed by: OBSTETRICS & GYNECOLOGY

## 2019-12-21 PROCEDURE — 6360000002 HC RX W HCPCS: Performed by: ANESTHESIOLOGY

## 2019-12-21 PROCEDURE — 6370000000 HC RX 637 (ALT 250 FOR IP): Performed by: ANESTHESIOLOGY

## 2019-12-21 PROCEDURE — 1220000000 HC SEMI PRIVATE OB R&B

## 2019-12-21 PROCEDURE — 85027 COMPLETE CBC AUTOMATED: CPT

## 2019-12-21 RX ORDER — IBUPROFEN 800 MG/1
800 TABLET ORAL EVERY 8 HOURS
Qty: 120 TABLET | Refills: 3 | Status: SHIPPED | OUTPATIENT
Start: 2019-12-22 | End: 2020-03-10

## 2019-12-21 RX ORDER — HYDROCODONE BITARTRATE AND ACETAMINOPHEN 5; 325 MG/1; MG/1
1 TABLET ORAL EVERY 4 HOURS PRN
Qty: 20 TABLET | Refills: 0 | Status: SHIPPED | OUTPATIENT
Start: 2019-12-21 | End: 2019-12-24

## 2019-12-21 RX ADMIN — LEVOTHYROXINE SODIUM 50 MCG: 50 TABLET ORAL at 06:52

## 2019-12-21 RX ADMIN — SODIUM CHLORIDE, PRESERVATIVE FREE 10 ML: 5 INJECTION INTRAVENOUS at 06:52

## 2019-12-21 RX ADMIN — OXYCODONE HYDROCHLORIDE 5 MG: 5 TABLET ORAL at 18:08

## 2019-12-21 RX ADMIN — HYDROCODONE BITARTRATE AND ACETAMINOPHEN 2 TABLET: 5; 325 TABLET ORAL at 22:17

## 2019-12-21 RX ADMIN — FERROUS SULFATE TAB 325 MG (65 MG ELEMENTAL FE) 325 MG: 325 (65 FE) TAB at 08:27

## 2019-12-21 RX ADMIN — SODIUM CHLORIDE, PRESERVATIVE FREE 10 ML: 5 INJECTION INTRAVENOUS at 05:24

## 2019-12-21 RX ADMIN — KETOROLAC TROMETHAMINE 15 MG: 30 INJECTION, SOLUTION INTRAMUSCULAR at 00:50

## 2019-12-21 RX ADMIN — KETOROLAC TROMETHAMINE 15 MG: 30 INJECTION, SOLUTION INTRAMUSCULAR at 13:05

## 2019-12-21 RX ADMIN — DOCUSATE SODIUM 100 MG: 100 CAPSULE, LIQUID FILLED ORAL at 21:02

## 2019-12-21 RX ADMIN — SODIUM CHLORIDE, PRESERVATIVE FREE 10 ML: 5 INJECTION INTRAVENOUS at 13:05

## 2019-12-21 RX ADMIN — KETOROLAC TROMETHAMINE 15 MG: 30 INJECTION, SOLUTION INTRAMUSCULAR at 06:50

## 2019-12-21 RX ADMIN — SODIUM CHLORIDE, POTASSIUM CHLORIDE, SODIUM LACTATE AND CALCIUM CHLORIDE: 600; 310; 30; 20 INJECTION, SOLUTION INTRAVENOUS at 01:20

## 2019-12-21 RX ADMIN — SODIUM CHLORIDE, PRESERVATIVE FREE 10 ML: 5 INJECTION INTRAVENOUS at 09:15

## 2019-12-21 RX ADMIN — LEVOTHYROXINE SODIUM 125 MCG: 125 TABLET ORAL at 06:52

## 2019-12-21 RX ADMIN — FERROUS SULFATE TAB 325 MG (65 MG ELEMENTAL FE) 325 MG: 325 (65 FE) TAB at 18:08

## 2019-12-21 RX ADMIN — SERTRALINE HYDROCHLORIDE 100 MG: 50 TABLET ORAL at 08:27

## 2019-12-21 RX ADMIN — DOCUSATE SODIUM 100 MG: 100 CAPSULE, LIQUID FILLED ORAL at 08:27

## 2019-12-21 RX ADMIN — SIMETHICONE CHEW TAB 80 MG 80 MG: 80 TABLET ORAL at 08:27

## 2019-12-21 ASSESSMENT — PAIN DESCRIPTION - LOCATION
LOCATION: INCISION
LOCATION: ABDOMEN;INCISION

## 2019-12-21 ASSESSMENT — PAIN DESCRIPTION - ONSET
ONSET: GRADUAL

## 2019-12-21 ASSESSMENT — PAIN DESCRIPTION - PROGRESSION
CLINICAL_PROGRESSION: GRADUALLY WORSENING
CLINICAL_PROGRESSION: GRADUALLY WORSENING

## 2019-12-21 ASSESSMENT — PAIN SCALES - GENERAL
PAINLEVEL_OUTOF10: 7
PAINLEVEL_OUTOF10: 2
PAINLEVEL_OUTOF10: 4
PAINLEVEL_OUTOF10: 4
PAINLEVEL_OUTOF10: 5
PAINLEVEL_OUTOF10: 0
PAINLEVEL_OUTOF10: 0
PAINLEVEL_OUTOF10: 1

## 2019-12-21 ASSESSMENT — PAIN DESCRIPTION - DESCRIPTORS
DESCRIPTORS: SORE;TENDER;DISCOMFORT
DESCRIPTORS: ACHING

## 2019-12-21 ASSESSMENT — PAIN - FUNCTIONAL ASSESSMENT
PAIN_FUNCTIONAL_ASSESSMENT: PREVENTS OR INTERFERES SOME ACTIVE ACTIVITIES AND ADLS
PAIN_FUNCTIONAL_ASSESSMENT: ACTIVITIES ARE NOT PREVENTED

## 2019-12-21 ASSESSMENT — PAIN DESCRIPTION - FREQUENCY
FREQUENCY: INTERMITTENT
FREQUENCY: INTERMITTENT

## 2019-12-21 ASSESSMENT — PAIN DESCRIPTION - ORIENTATION
ORIENTATION: LOWER

## 2019-12-21 ASSESSMENT — PAIN DESCRIPTION - PAIN TYPE
TYPE: SURGICAL PAIN
TYPE: ACUTE PAIN;SURGICAL PAIN

## 2019-12-22 PROCEDURE — 1220000000 HC SEMI PRIVATE OB R&B

## 2019-12-22 PROCEDURE — 6370000000 HC RX 637 (ALT 250 FOR IP): Performed by: OBSTETRICS & GYNECOLOGY

## 2019-12-22 RX ORDER — IBUPROFEN 800 MG/1
800 TABLET ORAL EVERY 8 HOURS
Status: DISCONTINUED | OUTPATIENT
Start: 2019-12-22 | End: 2019-12-23 | Stop reason: HOSPADM

## 2019-12-22 RX ADMIN — IBUPROFEN 800 MG: 800 TABLET, FILM COATED ORAL at 20:38

## 2019-12-22 RX ADMIN — FERROUS SULFATE TAB 325 MG (65 MG ELEMENTAL FE) 325 MG: 325 (65 FE) TAB at 08:53

## 2019-12-22 RX ADMIN — HYDROCODONE BITARTRATE AND ACETAMINOPHEN 2 TABLET: 5; 325 TABLET ORAL at 08:55

## 2019-12-22 RX ADMIN — LEVOTHYROXINE SODIUM 50 MCG: 50 TABLET ORAL at 06:29

## 2019-12-22 RX ADMIN — METFORMIN HYDROCHLORIDE 1 TABLET: 500 TABLET, EXTENDED RELEASE ORAL at 08:54

## 2019-12-22 RX ADMIN — SERTRALINE HYDROCHLORIDE 100 MG: 50 TABLET ORAL at 08:54

## 2019-12-22 RX ADMIN — LEVOTHYROXINE SODIUM 125 MCG: 125 TABLET ORAL at 06:29

## 2019-12-22 RX ADMIN — IBUPROFEN 800 MG: 800 TABLET, FILM COATED ORAL at 02:35

## 2019-12-22 RX ADMIN — SIMETHICONE CHEW TAB 80 MG 80 MG: 80 TABLET ORAL at 08:54

## 2019-12-22 RX ADMIN — DOCUSATE SODIUM 100 MG: 100 CAPSULE, LIQUID FILLED ORAL at 08:53

## 2019-12-22 RX ADMIN — IBUPROFEN 800 MG: 800 TABLET, FILM COATED ORAL at 12:34

## 2019-12-22 RX ADMIN — FERROUS SULFATE TAB 325 MG (65 MG ELEMENTAL FE) 325 MG: 325 (65 FE) TAB at 20:38

## 2019-12-22 RX ADMIN — DOCUSATE SODIUM 100 MG: 100 CAPSULE, LIQUID FILLED ORAL at 20:38

## 2019-12-22 ASSESSMENT — PAIN DESCRIPTION - PAIN TYPE
TYPE: SURGICAL PAIN

## 2019-12-22 ASSESSMENT — PAIN DESCRIPTION - DESCRIPTORS
DESCRIPTORS: DISCOMFORT;ACHING;SORE
DESCRIPTORS: DISCOMFORT;ACHING;SORE
DESCRIPTORS: DISCOMFORT

## 2019-12-22 ASSESSMENT — PAIN DESCRIPTION - PROGRESSION
CLINICAL_PROGRESSION: RESOLVED
CLINICAL_PROGRESSION: RESOLVED
CLINICAL_PROGRESSION: GRADUALLY WORSENING
CLINICAL_PROGRESSION: GRADUALLY WORSENING
CLINICAL_PROGRESSION: RESOLVED

## 2019-12-22 ASSESSMENT — PAIN DESCRIPTION - ORIENTATION
ORIENTATION: LOWER
ORIENTATION: LOWER

## 2019-12-22 ASSESSMENT — PAIN SCALES - GENERAL
PAINLEVEL_OUTOF10: 3
PAINLEVEL_OUTOF10: 3
PAINLEVEL_OUTOF10: 7
PAINLEVEL_OUTOF10: 0
PAINLEVEL_OUTOF10: 3
PAINLEVEL_OUTOF10: 0
PAINLEVEL_OUTOF10: 0
PAINLEVEL_OUTOF10: 3

## 2019-12-22 ASSESSMENT — PAIN DESCRIPTION - ONSET
ONSET: GRADUAL
ONSET: GRADUAL

## 2019-12-22 ASSESSMENT — PAIN DESCRIPTION - LOCATION
LOCATION: INCISION
LOCATION: ABDOMEN;INCISION
LOCATION: ABDOMEN;INCISION

## 2019-12-22 ASSESSMENT — PAIN DESCRIPTION - FREQUENCY
FREQUENCY: INTERMITTENT

## 2019-12-22 ASSESSMENT — PAIN - FUNCTIONAL ASSESSMENT
PAIN_FUNCTIONAL_ASSESSMENT: ACTIVITIES ARE NOT PREVENTED
PAIN_FUNCTIONAL_ASSESSMENT: ACTIVITIES ARE NOT PREVENTED

## 2019-12-23 VITALS
HEART RATE: 94 BPM | BODY MASS INDEX: 42.8 KG/M2 | TEMPERATURE: 98.8 F | HEIGHT: 60 IN | WEIGHT: 218 LBS | SYSTOLIC BLOOD PRESSURE: 131 MMHG | OXYGEN SATURATION: 97 % | DIASTOLIC BLOOD PRESSURE: 79 MMHG | RESPIRATION RATE: 18 BRPM

## 2019-12-23 PROCEDURE — 6370000000 HC RX 637 (ALT 250 FOR IP): Performed by: OBSTETRICS & GYNECOLOGY

## 2019-12-23 RX ADMIN — METFORMIN HYDROCHLORIDE 1 TABLET: 500 TABLET, EXTENDED RELEASE ORAL at 09:04

## 2019-12-23 RX ADMIN — FERROUS SULFATE TAB 325 MG (65 MG ELEMENTAL FE) 325 MG: 325 (65 FE) TAB at 09:05

## 2019-12-23 RX ADMIN — SIMETHICONE CHEW TAB 80 MG 80 MG: 80 TABLET ORAL at 09:04

## 2019-12-23 RX ADMIN — DOCUSATE SODIUM 100 MG: 100 CAPSULE, LIQUID FILLED ORAL at 09:04

## 2019-12-23 RX ADMIN — SERTRALINE HYDROCHLORIDE 100 MG: 50 TABLET ORAL at 09:07

## 2019-12-23 RX ADMIN — IBUPROFEN 800 MG: 800 TABLET, FILM COATED ORAL at 07:01

## 2019-12-23 RX ADMIN — LEVOTHYROXINE SODIUM 50 MCG: 50 TABLET ORAL at 07:01

## 2019-12-23 RX ADMIN — LEVOTHYROXINE SODIUM 125 MCG: 125 TABLET ORAL at 07:00

## 2019-12-23 ASSESSMENT — PAIN SCALES - GENERAL
PAINLEVEL_OUTOF10: 0
PAINLEVEL_OUTOF10: 2

## 2019-12-23 ASSESSMENT — PAIN DESCRIPTION - PROGRESSION: CLINICAL_PROGRESSION: GRADUALLY WORSENING

## 2020-02-19 ENCOUNTER — HOSPITAL ENCOUNTER (OUTPATIENT)
Age: 30
Discharge: HOME | End: 2020-02-21

## 2020-02-19 PROCEDURE — 86762 RUBELLA ANTIBODY: CPT

## 2020-02-19 PROCEDURE — 86735 MUMPS ANTIBODY: CPT

## 2020-02-19 PROCEDURE — 86787 VARICELLA-ZOSTER ANTIBODY: CPT

## 2020-02-19 PROCEDURE — 86765 RUBEOLA ANTIBODY: CPT

## 2020-02-20 LAB
MEASLES IMMUNE (IGG): NORMAL
MUMPS AB IGG: NORMAL
RUBELLA ANTIBODY IGG: NORMAL
VARICELLA-ZOSTER VIRUS AB, IGG: NORMAL

## 2020-03-10 ENCOUNTER — OFFICE VISIT (OUTPATIENT)
Dept: FAMILY MEDICINE CLINIC | Age: 30
End: 2020-03-10

## 2020-03-10 VITALS
DIASTOLIC BLOOD PRESSURE: 70 MMHG | HEART RATE: 98 BPM | HEIGHT: 60 IN | WEIGHT: 198 LBS | OXYGEN SATURATION: 98 % | BODY MASS INDEX: 38.87 KG/M2 | SYSTOLIC BLOOD PRESSURE: 110 MMHG | TEMPERATURE: 98.1 F

## 2020-03-10 LAB
INFLUENZA A ANTIBODY: POSITIVE
INFLUENZA B ANTIBODY: NEGATIVE

## 2020-03-10 PROCEDURE — 87804 INFLUENZA ASSAY W/OPTIC: CPT | Performed by: INTERNAL MEDICINE

## 2020-03-10 PROCEDURE — 99213 OFFICE O/P EST LOW 20 MIN: CPT | Performed by: INTERNAL MEDICINE

## 2020-03-10 RX ORDER — OSELTAMIVIR PHOSPHATE 75 MG/1
75 CAPSULE ORAL 2 TIMES DAILY
Qty: 10 CAPSULE | Refills: 0 | Status: SHIPPED | OUTPATIENT
Start: 2020-03-10 | End: 2020-03-15

## 2020-03-10 RX ORDER — LEVOTHYROXINE SODIUM 137 UG/1
TABLET ORAL
COMMUNITY
Start: 2020-03-09 | End: 2020-04-17 | Stop reason: SDUPTHER

## 2020-03-10 RX ORDER — NORGESTIMATE AND ETHINYL ESTRADIOL 0.25-0.035
KIT ORAL
COMMUNITY
Start: 2020-03-09 | End: 2020-07-16

## 2020-03-10 NOTE — PROGRESS NOTES
rhinorrhea present. Mouth/Throat:      Pharynx: Posterior oropharyngeal erythema present. Eyes:      Pupils: Pupils are equal, round, and reactive to light. Neck:      Musculoskeletal: Normal range of motion and neck supple. Thyroid: No thyromegaly. Cardiovascular:      Rate and Rhythm: Normal rate and regular rhythm. Heart sounds: Normal heart sounds. No murmur. Pulmonary:      Effort: Pulmonary effort is normal.      Breath sounds: Normal breath sounds. No wheezing. Abdominal:      General: Bowel sounds are normal. There is no distension. Palpations: Abdomen is soft. Tenderness: There is no abdominal tenderness. There is no guarding or rebound. Musculoskeletal: Normal range of motion. Lymphadenopathy:      Cervical: No cervical adenopathy. Skin:     General: Skin is warm and dry. Neurological:      Mental Status: She is alert and oriented to person, place, and time. Cranial Nerves: No cranial nerve deficit. Psychiatric:         Judgment: Judgment normal.         Assessment and Plan:   Alo Cadena was seen today for nasal congestion and cough. Diagnoses and all orders for this visit:    Influenza A  Comments:  add tamiflu   fluids   tylenol alt with ibuprofen prn   diet as tolerated   Orders:  -     oseltamivir (TAMIFLU) 75 MG capsule; Take 1 capsule by mouth 2 times daily for 5 days    Flu-like symptoms  -     POCT Influenza A/B  -     oseltamivir (TAMIFLU) 75 MG capsule; Take 1 capsule by mouth 2 times daily for 5 days    Follow-up with your primary care provider in 7-10 days for re-evaluation and further management. Return  sooner or go to the Emergency Department immediately if your condition changes or worsens. Return for check up and review.       Oneyda Varela MD

## 2020-03-25 ENCOUNTER — TELEPHONE (OUTPATIENT)
Dept: FAMILY MEDICINE CLINIC | Age: 30
End: 2020-03-25

## 2020-04-16 ENCOUNTER — TELEPHONE (OUTPATIENT)
Dept: ADMINISTRATIVE | Age: 30
End: 2020-04-16

## 2020-04-17 ENCOUNTER — HOSPITAL ENCOUNTER (OUTPATIENT)
Age: 30
Discharge: HOME | End: 2020-04-19
Payer: COMMERCIAL

## 2020-04-17 ENCOUNTER — OFFICE VISIT (OUTPATIENT)
Dept: PRIMARY CARE CLINIC | Age: 30
End: 2020-04-17
Payer: COMMERCIAL

## 2020-04-17 VITALS
OXYGEN SATURATION: 96 % | DIASTOLIC BLOOD PRESSURE: 84 MMHG | BODY MASS INDEX: 40.05 KG/M2 | WEIGHT: 204 LBS | TEMPERATURE: 99.5 F | HEIGHT: 60 IN | HEART RATE: 108 BPM | SYSTOLIC BLOOD PRESSURE: 110 MMHG

## 2020-04-17 PROBLEM — Z3A.21 21 WEEKS GESTATION OF PREGNANCY: Status: RESOLVED | Noted: 2019-08-16 | Resolved: 2020-04-17

## 2020-04-17 PROBLEM — Z34.93 PREGNANT AND NOT YET DELIVERED IN THIRD TRIMESTER: Status: RESOLVED | Noted: 2019-10-27 | Resolved: 2020-04-17

## 2020-04-17 PROBLEM — O16.3 ELEVATED BLOOD PRESSURE AFFECTING PREGNANCY IN THIRD TRIMESTER, ANTEPARTUM: Status: RESOLVED | Noted: 2019-12-18 | Resolved: 2020-04-17

## 2020-04-17 LAB
ALBUMIN SERPL-MCNC: 4.3 G/DL (ref 3.5–5.2)
ALP BLD-CCNC: 87 U/L (ref 35–104)
ALT SERPL-CCNC: 18 U/L (ref 0–32)
ANION GAP SERPL CALCULATED.3IONS-SCNC: 14 MMOL/L (ref 7–16)
AST SERPL-CCNC: 18 U/L (ref 0–31)
BASOPHILS ABSOLUTE: 0.07 E9/L (ref 0–0.2)
BASOPHILS RELATIVE PERCENT: 0.6 % (ref 0–2)
BILIRUB SERPL-MCNC: <0.2 MG/DL (ref 0–1.2)
BUN BLDV-MCNC: 8 MG/DL (ref 6–20)
CALCIUM SERPL-MCNC: 9.4 MG/DL (ref 8.6–10.2)
CHLORIDE BLD-SCNC: 104 MMOL/L (ref 98–107)
CHOLESTEROL, TOTAL: 225 MG/DL (ref 0–199)
CO2: 22 MMOL/L (ref 22–29)
CREAT SERPL-MCNC: 0.7 MG/DL (ref 0.5–1)
EOSINOPHILS ABSOLUTE: 0.35 E9/L (ref 0.05–0.5)
EOSINOPHILS RELATIVE PERCENT: 3.2 % (ref 0–6)
FERRITIN: 9 NG/ML
GFR AFRICAN AMERICAN: >60
GFR NON-AFRICAN AMERICAN: >60 ML/MIN/1.73
GLUCOSE BLD-MCNC: 124 MG/DL (ref 74–99)
HBA1C MFR BLD: 4.9 % (ref 4–5.6)
HCT VFR BLD CALC: 38 % (ref 34–48)
HDLC SERPL-MCNC: 37 MG/DL
HEMOGLOBIN: 12.1 G/DL (ref 11.5–15.5)
IMMATURE GRANULOCYTES #: 0.04 E9/L
IMMATURE GRANULOCYTES %: 0.4 % (ref 0–5)
IRON SATURATION: 11 % (ref 15–50)
IRON: 43 MCG/DL (ref 37–145)
LDL CHOLESTEROL CALCULATED: 118 MG/DL (ref 0–99)
LYMPHOCYTES ABSOLUTE: 2.94 E9/L (ref 1.5–4)
LYMPHOCYTES RELATIVE PERCENT: 26.5 % (ref 20–42)
MCH RBC QN AUTO: 26.7 PG (ref 26–35)
MCHC RBC AUTO-ENTMCNC: 31.8 % (ref 32–34.5)
MCV RBC AUTO: 83.7 FL (ref 80–99.9)
MONOCYTES ABSOLUTE: 0.59 E9/L (ref 0.1–0.95)
MONOCYTES RELATIVE PERCENT: 5.3 % (ref 2–12)
NEUTROPHILS ABSOLUTE: 7.09 E9/L (ref 1.8–7.3)
NEUTROPHILS RELATIVE PERCENT: 64 % (ref 43–80)
PDW BLD-RTO: 14.5 FL (ref 11.5–15)
PLATELET # BLD: 416 E9/L (ref 130–450)
PMV BLD AUTO: 10.2 FL (ref 7–12)
POTASSIUM SERPL-SCNC: 4.2 MMOL/L (ref 3.5–5)
RBC # BLD: 4.54 E12/L (ref 3.5–5.5)
SODIUM BLD-SCNC: 140 MMOL/L (ref 132–146)
TOTAL IRON BINDING CAPACITY: 385 MCG/DL (ref 250–450)
TOTAL PROTEIN: 8 G/DL (ref 6.4–8.3)
TRIGL SERPL-MCNC: 349 MG/DL (ref 0–149)
TSH SERPL DL<=0.05 MIU/L-ACNC: 35.6 UIU/ML (ref 0.27–4.2)
VITAMIN D 25-HYDROXY: 18 NG/ML (ref 30–100)
VLDLC SERPL CALC-MCNC: 70 MG/DL
WBC # BLD: 11.1 E9/L (ref 4.5–11.5)

## 2020-04-17 PROCEDURE — 82728 ASSAY OF FERRITIN: CPT

## 2020-04-17 PROCEDURE — 82306 VITAMIN D 25 HYDROXY: CPT

## 2020-04-17 PROCEDURE — 86376 MICROSOMAL ANTIBODY EACH: CPT

## 2020-04-17 PROCEDURE — 83540 ASSAY OF IRON: CPT

## 2020-04-17 PROCEDURE — 85025 COMPLETE CBC W/AUTO DIFF WBC: CPT

## 2020-04-17 PROCEDURE — 36415 COLL VENOUS BLD VENIPUNCTURE: CPT

## 2020-04-17 PROCEDURE — 83036 HEMOGLOBIN GLYCOSYLATED A1C: CPT

## 2020-04-17 PROCEDURE — 80053 COMPREHEN METABOLIC PANEL: CPT

## 2020-04-17 PROCEDURE — 99204 OFFICE O/P NEW MOD 45 MIN: CPT | Performed by: FAMILY MEDICINE

## 2020-04-17 PROCEDURE — 84443 ASSAY THYROID STIM HORMONE: CPT

## 2020-04-17 PROCEDURE — 80061 LIPID PANEL: CPT

## 2020-04-17 PROCEDURE — 83550 IRON BINDING TEST: CPT

## 2020-04-17 RX ORDER — LEVOTHYROXINE SODIUM 137 UG/1
137 TABLET ORAL DAILY
Qty: 30 TABLET | Refills: 0 | Status: SHIPPED
Start: 2020-04-17 | End: 2020-04-20 | Stop reason: DRUGHIGH

## 2020-04-17 RX ORDER — BUSPIRONE HYDROCHLORIDE 5 MG/1
5 TABLET ORAL 3 TIMES DAILY
Qty: 90 TABLET | Refills: 0 | Status: SHIPPED | OUTPATIENT
Start: 2020-04-17 | End: 2020-05-17

## 2020-04-17 RX ORDER — LEVOTHYROXINE SODIUM 137 UG/1
137 TABLET ORAL DAILY
Qty: 30 TABLET | Refills: 5 | Status: CANCELLED | OUTPATIENT
Start: 2020-04-17

## 2020-04-17 ASSESSMENT — ENCOUNTER SYMPTOMS
COUGH: 0
ABDOMINAL PAIN: 0
VOMITING: 0
WHEEZING: 0
DIARRHEA: 0
BACK PAIN: 0
CONSTIPATION: 0
NAUSEA: 0
SHORTNESS OF BREATH: 0

## 2020-04-17 ASSESSMENT — PATIENT HEALTH QUESTIONNAIRE - PHQ9
2. FEELING DOWN, DEPRESSED OR HOPELESS: 1
SUM OF ALL RESPONSES TO PHQ9 QUESTIONS 1 & 2: 2
SUM OF ALL RESPONSES TO PHQ QUESTIONS 1-9: 2
1. LITTLE INTEREST OR PLEASURE IN DOING THINGS: 1
SUM OF ALL RESPONSES TO PHQ QUESTIONS 1-9: 2

## 2020-04-17 NOTE — PROGRESS NOTES
20  Nilam Donahue : 1990 Sex: female  Age: 26 y.o. Chief Complaint   Patient presents with   [facility] Blood Work     needs thyroid levels checked    Anxiety     anxiety has got worse since she had her child in dec      HPI:  26 y.o. female presents today to establish care with a new PCP. Patient's chart, medical, surgical and medication history all reviewed. Hypothyroidism  Patient presents for routine follow up of Hypothyroidism. Current symptoms: nervousness. Patient denies change in energy level, diarrhea, heat / cold intolerance, palpitations and weight changes. Symptoms have gradually worsened. No difficulty swallowing or masses felt. Last TSH was 10.3 (2020). Patient is currently taking Levothyroxine 137 mcg. Anxiety  Patient complains of evaluation of anxiety disorder and panic attacks. She has the following anxiety symptoms: difficulty concentrating, feelings of losing control, racing thoughts. Onset of symptoms was approximately several months ago, gradually worsening since that time. She denies current suicidal and homicidal ideation. Possible organic causes contributing are: endocrine/metabolic. Risk factors: previous episode of depression. Previous treatment includes Zoloft and no therapy. She complains of the following side effects from the treatment: none. Patient states that she had the dose of Zoloft increased to 100 mg in November, but hasn't noticed much improvement with anxiety. In fact, anxiety is worsening since birth of child. ROS:  Review of Systems   Constitutional: Negative for chills, fatigue and fever. Respiratory: Negative for cough, shortness of breath and wheezing. Cardiovascular: Negative for chest pain and palpitations. Gastrointestinal: Negative for abdominal pain, constipation, diarrhea, nausea and vomiting. Musculoskeletal: Negative for arthralgias and back pain. Skin: Negative for rash.    Neurological: Negative for dizziness and headaches. Psychiatric/Behavioral: Negative for dysphoric mood. The patient is nervous/anxious. All other systems reviewed and are negative. Current Outpatient Medications on File Prior to Visit   Medication Sig Dispense Refill    norgestimate-ethinyl estradiol (ORTHO-CYCLEN) 0.25-35 MG-MCG per tablet       sertraline (ZOLOFT) 50 MG tablet Take 100 mg by mouth daily        No current facility-administered medications on file prior to visit.         Allergies   Allergen Reactions    Avelox [Moxifloxacin] Swelling    Cefzil [Cefprozil] Swelling    Augmentin [Amoxicillin-Pot Clavulanate] Nausea And Vomiting     Also non narcotic pain medication       Past Medical History:   Diagnosis Date    Abnormal Pap smear of cervix     repeat without issue    Anemia     Depression     Gestational diabetes     Took metformin    Hypothyroidism      Past Surgical History:   Procedure Laterality Date     SECTION N/A 2019     SECTION performed by Lucretia Cummins DO at [facility]    TYMPANOSTOMY TUBE PLACEMENT      WISDOM TOOTH EXTRACTION       Family History   Problem Relation Age of Onset    Diabetes Father     Hypothyroidism Father    [place] Rheum Arthritis Mother     Breast Cancer Paternal Aunt         Social History     Socioeconomic History    Marital status: Single     Spouse name: Not on file    Number of children: Not on file    Years of education: Not on file    Highest education level: Not on file   Occupational History    Not on file   Social Needs    Financial resource strain: Not on file    Food insecurity     Worry: Not on file     Inability: Not on file    Transportation needs     Medical: Not on file     Non-medical: Not on file   Tobacco Use    Smoking status: Never Smoker    Smokeless tobacco: Never Used   Substance and Sexual Activity    Alcohol use: Not Currently    Drug use: Never    Sexual activity: Yes     Partners: Male Lifestyle    Physical activity     Days per week: Not on file     Minutes per session: Not on file    Stress: Not on file   Relationships    Social connections     Talks on phone: Not on file     Gets together: Not on file     Attends [employer] service: Not on file     Active member of club or organization: Not on file     Attends meetings of clubs or organizations: Not on file     Relationship status: Not on file    Intimate partner violence     Fear of current or ex partner: Not on file     Emotionally abused: Not on file     Physically abused: Not on file     Forced sexual activity: Not on file   Other Topics Concern    Not on file   Social History Narrative    Not on file       Vitals:    04/17/20 1408   BP: 110/84   Pulse: 108   Temp: 99.5 °F (37.5 °C)   SpO2: 96%   Weight: 204 lb (92.5 kg)   Height: 5' (1.524 m)       Physical Exam:  Physical Exam  Vitals signs and nursing note reviewed. Constitutional:       General: She is not in acute distress. Appearance: Normal appearance. She is well-developed. She is obese. HENT:      Head: Normocephalic and atraumatic. Right Ear: Hearing and external ear normal.      Left Ear: Hearing and external ear normal.      Nose: Nose normal.      Mouth/Throat:      Mouth: Mucous membranes are moist.   Eyes:      General: Lids are normal. No scleral icterus. Extraocular Movements: Extraocular movements intact. Conjunctiva/sclera: Conjunctivae normal.   Neck:      Musculoskeletal: Normal range of motion and neck supple. Thyroid: No thyromegaly. Cardiovascular:      Rate and Rhythm: Regular rhythm. Tachycardia present. Heart sounds: Normal heart sounds. No murmur. Pulmonary:      Effort: Pulmonary effort is normal. No respiratory distress. Breath sounds: Normal breath sounds. No wheezing. Musculoskeletal: Normal range of motion. General: No tenderness or deformity. Right lower leg: No edema.       Left lower leg: No increased anxiety may be due to thyroid abnormality as she had her dose adjusted during pregnancy. Other iron deficiency anemia  -     Iron and TIBC; Future  -     Ferritin; Future  Issues with iron deficiency during pregnancy. Needed to be on iron tablets. Will recheck. History of gestational diabetes  -     Hemoglobin A1C; Future    Screening for cardiovascular condition  -     Lipid Panel; Future    Vitamin D insufficiency  -     Vitamin D 25 Hydroxy; Future        Return in about 3 months (around 7/17/2020) for Hypothyroidism.       Seen By:  Linda Jaramillo,

## 2020-04-20 RX ORDER — LEVOTHYROXINE SODIUM 0.15 MG/1
150 TABLET ORAL DAILY
Qty: 30 TABLET | Refills: 5 | Status: SHIPPED
Start: 2020-04-20 | End: 2020-07-17 | Stop reason: SDUPTHER

## 2020-04-22 LAB — THYROID PEROXIDASE (TPO) ABS: 978.9 IU/ML (ref 0–9)

## 2020-06-26 ENCOUNTER — TELEPHONE (OUTPATIENT)
Dept: FAMILY MEDICINE CLINIC | Age: 30
End: 2020-06-26

## 2020-06-26 RX ORDER — SERTRALINE HYDROCHLORIDE 100 MG/1
100 TABLET, FILM COATED ORAL DAILY
Qty: 90 TABLET | Refills: 1 | Status: SHIPPED
Start: 2020-06-26 | End: 2021-02-02

## 2020-06-26 NOTE — TELEPHONE ENCOUNTER
Cristel Paz would like a refill on Sertaline 100mg. This was per scribed by former OB/GYN but in her New Patient appt with you would be willing to refill.   [address], [place]

## 2020-07-16 ENCOUNTER — HOSPITAL ENCOUNTER (OUTPATIENT)
Age: 30
Discharge: HOME | End: 2020-07-18
Payer: COMMERCIAL

## 2020-07-16 LAB
T4 FREE: 0.9 NG/DL (ref 0.93–1.7)
TSH SERPL DL<=0.05 MIU/L-ACNC: 126.7 UIU/ML (ref 0.27–4.2)

## 2020-07-16 PROCEDURE — 86376 MICROSOMAL ANTIBODY EACH: CPT

## 2020-07-16 PROCEDURE — 84439 ASSAY OF FREE THYROXINE: CPT

## 2020-07-16 PROCEDURE — 84443 ASSAY THYROID STIM HORMONE: CPT

## 2020-07-16 PROCEDURE — 36415 COLL VENOUS BLD VENIPUNCTURE: CPT

## 2020-07-16 RX ORDER — NORGESTIMATE AND ETHINYL ESTRADIOL 7DAYSX3 28
KIT ORAL
COMMUNITY
Start: 2020-05-20 | End: 2022-01-26

## 2020-07-16 ASSESSMENT — ENCOUNTER SYMPTOMS
SHORTNESS OF BREATH: 0
CONSTIPATION: 0
VOMITING: 0
BACK PAIN: 0
DIARRHEA: 0
ABDOMINAL PAIN: 0
NAUSEA: 0
WHEEZING: 0
COUGH: 0

## 2020-07-17 ENCOUNTER — OFFICE VISIT (OUTPATIENT)
Dept: FAMILY MEDICINE CLINIC | Age: 30
End: 2020-07-17
Payer: COMMERCIAL

## 2020-07-17 VITALS
WEIGHT: 213 LBS | HEIGHT: 60 IN | TEMPERATURE: 97.9 F | OXYGEN SATURATION: 98 % | BODY MASS INDEX: 41.82 KG/M2 | HEART RATE: 97 BPM | DIASTOLIC BLOOD PRESSURE: 72 MMHG | SYSTOLIC BLOOD PRESSURE: 128 MMHG

## 2020-07-17 PROBLEM — E78.2 MIXED HYPERLIPIDEMIA: Status: ACTIVE | Noted: 2020-05-07

## 2020-07-17 PROBLEM — F41.9 ANXIETY: Status: ACTIVE | Noted: 2020-05-07

## 2020-07-17 PROBLEM — E06.3 HASHIMOTO'S THYROIDITIS: Status: ACTIVE | Noted: 2020-05-07

## 2020-07-17 PROCEDURE — 99214 OFFICE O/P EST MOD 30 MIN: CPT | Performed by: FAMILY MEDICINE

## 2020-07-17 RX ORDER — BUSPIRONE HYDROCHLORIDE 5 MG/1
TABLET ORAL
COMMUNITY
End: 2020-08-21

## 2020-07-17 RX ORDER — LEVOTHYROXINE SODIUM 0.2 MG/1
200 TABLET ORAL DAILY
Qty: 30 TABLET | Refills: 2 | Status: SHIPPED
Start: 2020-07-17 | End: 2020-10-19 | Stop reason: SDUPTHER

## 2020-07-17 NOTE — PROGRESS NOTES
20  David Chavez : 1990 Sex: female  Age: 26 y.o. Chief Complaint   Patient presents with    Hypothyroidism     HPI:  26 y.o. female presents today for 3 month follow up of Hypothyroidism. Patient's chart, medical, surgical and medication history all reviewed. Hypothyroidism  Patient presents for routine follow up of Hypothyroidism. Current symptoms: nervousness. Patient denies change in energy level, diarrhea, heat / cold intolerance, palpitations and weight changes. Symptoms have gradually worsened. No difficulty swallowing or masses felt. Last TSH was 126.70 (2020)--35.600 (2020)--10.3 (2020). Waiting on TPO antibody result. Patient is currently taking Levothyroxine 150 mcg. Anxiety  Patient complains of evaluation of anxiety disorder and panic attacks. She has the following anxiety symptoms: difficulty concentrating, feelings of losing control, racing thoughts. Onset of symptoms was approximately several months ago, gradually worsening since that time. She denies current suicidal and homicidal ideation. Possible organic causes contributing are: endocrine/metabolic. Risk factors: previous episode of depression. Previous treatment includes Zoloft and no therapy. She complains of the following side effects from the treatment: none. Patient states that she had the dose of Zoloft increased to 100 mg in November, but hasn't noticed much improvement with anxiety. In fact, anxiety is worsening since birth of child. ROS:  Review of Systems   Constitutional: Negative for chills, fatigue and fever. Respiratory: Negative for cough, shortness of breath and wheezing. Cardiovascular: Negative for chest pain and palpitations. Gastrointestinal: Negative for abdominal pain, constipation, diarrhea, nausea and vomiting. Musculoskeletal: Negative for arthralgias and back pain. Skin: Negative for rash. Neurological: Negative for dizziness and headaches. Psychiatric/Behavioral: Negative for dysphoric mood. The patient is nervous/anxious. All other systems reviewed and are negative. Current Outpatient Medications on File Prior to Visit   Medication Sig Dispense Refill    Norgestim-Eth Estrad Triphasic 0.18/0.215/0.25 MG-35 MCG TABS TAKE ONE TABLET BY MOUTH EVERY DAY FOR 3 MONTHS.  sertraline (ZOLOFT) 100 MG tablet Take 1 tablet by mouth daily 90 tablet 1    busPIRone (BUSPAR) 5 MG tablet buspirone 5 mg tablet       No current facility-administered medications on file prior to visit.         Allergies   Allergen Reactions    Avelox [Moxifloxacin] Swelling    Cefzil [Cefprozil] Swelling    Augmentin [Amoxicillin-Pot Clavulanate] Nausea And Vomiting     Also non narcotic pain medication       Past Medical History:   Diagnosis Date    Abnormal Pap smear of cervix     repeat without issue    Anemia     Depression     Gestational diabetes     Took metformin    Hypothyroidism      Past Surgical History:   Procedure Laterality Date     SECTION N/A 2019     SECTION performed by Darylene Jury, DO [facility] L[facility]    TYMPANOSTOMY TUBE PLACEMENT      WISDOM TOOTH EXTRACTION       Family History   Problem Relation Age of Onset    Diabetes Father     Hypothyroidism Father    [place] Rheum Arthritis Mother     Breast Cancer Paternal Aunt         Social History     Socioeconomic History    Marital status: Single     Spouse name: Not on file    Number of children: Not on file    Years of education: Not on file    Highest education level: Not on file   Occupational History    Not on file   Social Needs    Financial resource strain: Not on file    Food insecurity     Worry: Not on file     Inability: Not on file    Transportation needs     Medical: Not on file     Non-medical: Not on file   Tobacco Use    Smoking status: Never Smoker    Smokeless tobacco: Never Used   Substance and Sexual Activity    Alcohol use: Not Currently    Drug use: Never    Sexual activity: Yes     Partners: Male   Lifestyle    Physical activity     Days per week: Not on file     Minutes per session: Not on file    Stress: Not on file   Relationships    Social connections     Talks on phone: Not on file     Gets together: Not on file     Attends [employer] service: Not on file     Active member of club or organization: Not on file     Attends meetings of clubs or organizations: Not on file     Relationship status: Not on file    Intimate partner violence     Fear of current or ex partner: Not on file     Emotionally abused: Not on file     Physically abused: Not on file     Forced sexual activity: Not on file   Other Topics Concern    Not on file   Social History Narrative    Not on file       Vitals:    07/17/20 0813   BP: 128/72   Pulse: 97   Temp: 97.9 °F (36.6 °C)   TempSrc: Temporal   SpO2: 98%   Weight: 213 lb (96.6 kg)   Height: 5' (1.524 m)       Physical Exam:  Physical Exam  Vitals signs and nursing note reviewed. Constitutional:       General: She is not in acute distress. Appearance: Normal appearance. She is well-developed. She is obese. She is not ill-appearing. HENT:      Head: Normocephalic and atraumatic. Right Ear: Hearing and external ear normal.      Left Ear: Hearing and external ear normal.      Nose: Nose normal.      Mouth/Throat:      Mouth: Mucous membranes are moist.   Eyes:      General: Lids are normal. No scleral icterus. Extraocular Movements: Extraocular movements intact. Conjunctiva/sclera: Conjunctivae normal.   Neck:      Musculoskeletal: Normal range of motion and neck supple. Thyroid: Thyromegaly present. No thyroid mass or thyroid tenderness. Cardiovascular:      Rate and Rhythm: Normal rate and regular rhythm. Heart sounds: Normal heart sounds. No murmur. Pulmonary:      Effort: Pulmonary effort is normal. No respiratory distress. Breath sounds: Normal breath sounds. No wheezing. Musculoskeletal: Normal range of motion. General: No tenderness or deformity. Right lower leg: No edema. Left lower leg: No edema. Lymphadenopathy:      Cervical: No cervical adenopathy. Skin:     General: Skin is warm and dry. Findings: No rash. Neurological:      General: No focal deficit present. Mental Status: She is alert and oriented to person, place, and time. Psychiatric:         Mood and Affect: Mood and affect normal.         Speech: Speech normal.         Behavior: Behavior normal.         Thought Content: Thought content normal.         Labs:  CBC with Differential:    Lab Results   Component Value Date    WBC 11.1 04/17/2020    RBC 4.54 04/17/2020    HGB 12.1 04/17/2020    HCT 38.0 04/17/2020     04/17/2020    MCV 83.7 04/17/2020    MCH 26.7 04/17/2020    MCHC 31.8 04/17/2020    RDW 14.5 04/17/2020    LYMPHOPCT 26.5 04/17/2020    MONOPCT 5.3 04/17/2020    BASOPCT 0.6 04/17/2020    MONOSABS 0.59 04/17/2020    LYMPHSABS 2.94 04/17/2020    EOSABS 0.35 04/17/2020    BASOSABS 0.07 04/17/2020     CMP:    Lab Results   Component Value Date     04/17/2020    K 4.2 04/17/2020     04/17/2020    CO2 22 04/17/2020    BUN 8 04/17/2020    CREATININE 0.7 04/17/2020    GFRAA >60 04/17/2020    LABGLOM >60 04/17/2020    GLUCOSE 124 04/17/2020    PROT 8.0 04/17/2020    LABALBU 4.3 04/17/2020    CALCIUM 9.4 04/17/2020    BILITOT <0.2 04/17/2020    ALKPHOS 87 04/17/2020    AST 18 04/17/2020    ALT 18 04/17/2020     HgBA1c:    Lab Results   Component Value Date    LABA1C 4.9 04/17/2020     FLP:    Lab Results   Component Value Date    TRIG 349 04/17/2020    HDL 37 04/17/2020    LDLCALC 118 04/17/2020    LABVLDL 70 04/17/2020     TSH:    Lab Results   Component Value Date    .700 07/16/2020          Assessment and Plan:  Lauren Cee was seen today for hypothyroidism.     Diagnoses and all orders for this visit:    Hashimoto's thyroiditis  - levothyroxine (SYNTHROID) 200 MCG tablet; Take 1 tablet by mouth daily  -     TSH without Reflex; Future  -     Thyroid Peroxidase Antibody; Future  -     T4, Free; Future  -     T3; Future    Patient states that she is taking medication regularly and on an empty stomach. TSH just continues to rise. Waiting on TPO results. Will increase Synthroid to 200 mcg and recheck in 6 weeks (also check T3) but may need to add Cytomel or change Synthroid to branded medication. If continues to worsen, will also need US      Return in about 3 months (around 10/17/2020) for Recheck.       Seen By:  Laura Ruiz DO

## 2020-07-18 LAB — THYROID PEROXIDASE (TPO) ABS: 1657 IU/ML (ref 0–9)

## 2020-07-22 ENCOUNTER — EMPLOYEE WELLNESS (OUTPATIENT)
Dept: OTHER | Age: 30
End: 2020-07-22

## 2020-07-22 LAB
CHOLESTEROL, TOTAL: 217 MG/DL (ref 0–199)
GLUCOSE BLD-MCNC: 91 MG/DL (ref 74–107)
HDLC SERPL-MCNC: 34 MG/DL
LDL CHOLESTEROL CALCULATED: 140 MG/DL (ref 0–99)
TRIGL SERPL-MCNC: 216 MG/DL (ref 0–149)

## 2020-10-16 ENCOUNTER — OFFICE VISIT (OUTPATIENT)
Dept: FAMILY MEDICINE CLINIC | Age: 30
End: 2020-10-16
Payer: COMMERCIAL

## 2020-10-16 ENCOUNTER — HOSPITAL ENCOUNTER (OUTPATIENT)
Age: 30
Discharge: HOME | End: 2020-10-18
Payer: COMMERCIAL

## 2020-10-16 VITALS
OXYGEN SATURATION: 98 % | TEMPERATURE: 98.2 F | WEIGHT: 214 LBS | BODY MASS INDEX: 42.01 KG/M2 | HEIGHT: 60 IN | SYSTOLIC BLOOD PRESSURE: 132 MMHG | HEART RATE: 107 BPM | DIASTOLIC BLOOD PRESSURE: 86 MMHG

## 2020-10-16 LAB
CONTROL: NORMAL
PREGNANCY TEST URINE, POC: NORMAL
T3 TOTAL: 103.2 NG/DL (ref 80–200)
T4 FREE: 0.68 NG/DL (ref 0.93–1.7)
TSH SERPL DL<=0.05 MIU/L-ACNC: 29.64 UIU/ML (ref 0.27–4.2)

## 2020-10-16 PROCEDURE — 81025 URINE PREGNANCY TEST: CPT | Performed by: FAMILY MEDICINE

## 2020-10-16 PROCEDURE — 84443 ASSAY THYROID STIM HORMONE: CPT

## 2020-10-16 PROCEDURE — 84480 ASSAY TRIIODOTHYRONINE (T3): CPT

## 2020-10-16 PROCEDURE — 84439 ASSAY OF FREE THYROXINE: CPT

## 2020-10-16 PROCEDURE — 36415 COLL VENOUS BLD VENIPUNCTURE: CPT

## 2020-10-16 PROCEDURE — 86376 MICROSOMAL ANTIBODY EACH: CPT

## 2020-10-16 PROCEDURE — 99214 OFFICE O/P EST MOD 30 MIN: CPT | Performed by: FAMILY MEDICINE

## 2020-10-16 RX ORDER — LEVOTHYROXINE SODIUM 0.2 MG/1
200 TABLET ORAL DAILY
Qty: 30 TABLET | Refills: 2 | Status: CANCELLED | OUTPATIENT
Start: 2020-10-16 | End: 2020-11-15

## 2020-10-16 RX ORDER — OMEPRAZOLE 40 MG/1
40 CAPSULE, DELAYED RELEASE ORAL
Qty: 90 CAPSULE | Refills: 1 | Status: SHIPPED
Start: 2020-10-16 | End: 2021-02-10

## 2020-10-16 ASSESSMENT — ENCOUNTER SYMPTOMS
NAUSEA: 0
BACK PAIN: 0
SHORTNESS OF BREATH: 0
COUGH: 0
DIARRHEA: 0
ABDOMINAL PAIN: 0
VOMITING: 0
WHEEZING: 0
NAUSEA: 1
CONSTIPATION: 0

## 2020-10-16 NOTE — PROGRESS NOTES
20  Dylan Mayorga : 1990 Sex: female  Age: 26 y.o. Chief Complaint   Patient presents with    Thyroid Problem     HPI:  26 y.o. female presents today for 3 month follow up of Hypothyroidism. Patient's chart, medical, surgical and medication history all reviewed. Hypothyroidism  Patient presents for routine follow up of Hypothyroidism. Current symptoms: nervousness. Patient denies change in energy level, diarrhea, heat / cold intolerance, palpitations and weight changes. Symptoms have gradually worsened. No difficulty swallowing or masses felt. Last TSH was 126.70 (2020)--35.600 (2020)--10.3 (2020). Waiting on TPO antibody result. Patient is currently taking Levothyroxine 150 mcg. Anxiety  Patient complains of evaluation of anxiety disorder and panic attacks. She has the following anxiety symptoms: difficulty concentrating, feelings of losing control, racing thoughts. Onset of symptoms was approximately several months ago, gradually worsening since that time. She denies current suicidal and homicidal ideation. Possible organic causes contributing are: endocrine/metabolic. Risk factors: previous episode of depression. Previous treatment includes Zoloft and no therapy. She complains of the following side effects from the treatment: none. Patient states that she had the dose of Zoloft increased to 100 mg in November, but hasn't noticed much improvement with anxiety. In fact, anxiety is worsening since birth of child. ROS:  Review of Systems   Constitutional: Negative for chills, fatigue and fever. Respiratory: Negative for cough, shortness of breath and wheezing. Cardiovascular: Negative for chest pain and palpitations. Gastrointestinal: Negative for abdominal pain, constipation, diarrhea, nausea and vomiting. Musculoskeletal: Negative for arthralgias and back pain. Skin: Negative for rash. Neurological: Negative for dizziness and headaches. Psychiatric/Behavioral: Negative for dysphoric mood. The patient is nervous/anxious. All other systems reviewed and are negative. Current Outpatient Medications on File Prior to Visit   Medication Sig Dispense Refill    busPIRone (BUSPAR) 5 MG tablet TAKE ONE TABLET BY MOUTH THREE TIMES A DAY 90 tablet 5    levothyroxine (SYNTHROID) 200 MCG tablet Take 1 tablet by mouth daily 30 tablet 2    Norgestim-Eth Estrad Triphasic 0.18/0.215/0.25 MG-35 MCG TABS TAKE ONE TABLET BY MOUTH EVERY DAY FOR 3 MONTHS.  sertraline (ZOLOFT) 100 MG tablet Take 1 tablet by mouth daily 90 tablet 1     No current facility-administered medications on file prior to visit.         Allergies   Allergen Reactions    Avelox [Moxifloxacin] Swelling    Cefzil [Cefprozil] Swelling    Augmentin [Amoxicillin-Pot Clavulanate] Nausea And Vomiting     Also non narcotic pain medication       Past Medical History:   Diagnosis Date    Abnormal Pap smear of cervix     repeat without issue    Anemia     Depression     Gestational diabetes     Took metformin    Hypothyroidism      Past Surgical History:   Procedure Laterality Date     SECTION N/A 2019     SECTION performed [facility]    TYMPANOSTOMY TUBE PLACEMENT      WISDOM TOOTH EXTRACTION       Family History   Problem Relation Age of Onset    Diabetes Father     Hypothyroidism Father     Rheum Arthritis Mother     Breast Cancer Paternal Aunt         Social History     Socioeconomic History    Marital status: Single     Spouse name: Not on file    Number of children: Not on file    Years of education: Not on file    Highest education level: Not on file   Occupational History    Not on file   Social Needs    Financial resource strain: Not on file    Food insecurity     Worry: Not on file     Inability: Not on file    Transportation needs     Medical: Not on file     Non-medical: Not on file   Tobacco Use    Smoking status: Never Smoker    Smokeless tobacco: Never Used   Substance and Sexual Activity    Alcohol use: Not Currently    Drug use: Never    Sexual activity: Yes     Partners: Male   Lifestyle    Physical activity     Days per week: Not on file     Minutes per session: Not on file    Stress: Not on file   Relationships    Social connections     Talks on phone: Not on file     Gets together: Not on file     Attends [employer] service: Not on file     Active member of club or organization: Not on file     Attends meetings of clubs or organizations: Not on file     Relationship status: Not on file    Intimate partner violence     Fear of current or ex partner: Not on file     Emotionally abused: Not on file     Physically abused: Not on file     Forced sexual activity: Not on file   Other Topics Concern    Not on file   Social History Narrative    Not on file       Vitals:    10/16/20 0825   BP: 132/86   Pulse: 107   Temp: 98.2 °F (36.8 °C)   TempSrc: Temporal   SpO2: 98%   Weight: 214 lb (97.1 kg)   Height: 5' (1.524 m)       Physical Exam:  Physical Exam  Vitals signs and nursing note reviewed. Constitutional:       General: She is not in acute distress. Appearance: Normal appearance. She is well-developed. She is obese. She is not ill-appearing. HENT:      Head: Normocephalic and atraumatic. Right Ear: Hearing and external ear normal.      Left Ear: Hearing and external ear normal.      Nose: Nose normal.      Mouth/Throat:      Mouth: Mucous membranes are moist.   Eyes:      General: Lids are normal. No scleral icterus. Extraocular Movements: Extraocular movements intact. Conjunctiva/sclera: Conjunctivae normal.   Neck:      Musculoskeletal: Normal range of motion and neck supple. Thyroid: Thyromegaly present. No thyroid mass or thyroid tenderness. Cardiovascular:      Rate and Rhythm: Normal rate and regular rhythm. Heart sounds: Normal heart sounds. No murmur. seen today for thyroid problem. Diagnoses and all orders for this visit:    Hashimoto's thyroiditis    Patient states that she is taking medication regularly and on an empty stomach. TSH just continues to rise. Waiting on TPO results. Will increase Synthroid to 200 mcg and recheck in 6 weeks (also check T3) but may need to add Cytomel or change Synthroid to branded medication. If continues to worsen, will also need US      No follow-ups on file.       Seen By:  London Nunez, DO

## 2020-10-16 NOTE — PROGRESS NOTES
10/16/20  Ariel Jim : 1990 Sex: female  Age: 26 y.o. Chief Complaint   Patient presents with    Thyroid Problem     HPI:  26 y.o. female presents today for 3 month follow up of chronic medical conditions and FBW. Patient's chart, medical, surgical and medication history all reviewed. Hypothyroidism  Patient presents for routine follow up of Hypothyroidism. Current symptoms: nervousness. Patient denies change in energy level, diarrhea, heat / cold intolerance, palpitations and weight changes. Symptoms have gradually worsened. No difficulty swallowing or masses felt. Last TSH was 126.70 (2020)--35.600 (2020)--10.3 (2020). She has not yet had labs completed. Patient is currently taking Levothyroxine 200 mcg. Anxiety  Patient complains of evaluation of anxiety disorder and panic attacks. She has the following anxiety symptoms: difficulty concentrating, feelings of losing control, racing thoughts. Onset of symptoms was approximately several months ago, gradually worsening since that time. She denies current suicidal and homicidal ideation. Possible organic causes contributing are: endocrine/metabolic. Risk factors: previous episode of depression. Previous treatment includes Zoloft and no therapy. She complains of the following side effects from the treatment: none. Patient notes that she has been having intermittent nausea for about the last month. Can be with any food, but occurs at least once a day. Just recently got , so stress of the wedding is over. Has 9 month at home. Had same symptoms with last pregnancy. ROS:  Review of Systems   Constitutional: Negative for chills, fatigue and fever. Respiratory: Negative for cough, shortness of breath and wheezing. Cardiovascular: Negative for chest pain and palpitations. Gastrointestinal: Positive for nausea. Negative for abdominal pain, constipation, diarrhea and vomiting.    Musculoskeletal: Negative for arthralgias and back pain. Skin: Negative for rash. Neurological: Negative for dizziness and headaches. Psychiatric/Behavioral: Negative for dysphoric mood. The patient is nervous/anxious. All other systems reviewed and are negative. Current Outpatient Medications on File Prior to Visit   Medication Sig Dispense Refill    busPIRone (BUSPAR) 5 MG tablet TAKE ONE TABLET BY MOUTH THREE TIMES A DAY 90 tablet 5    levothyroxine (SYNTHROID) 200 MCG tablet Take 1 tablet by mouth daily 30 tablet 2    Norgestim-Eth Estrad Triphasic 0.18/0.215/0.25 MG-35 MCG TABS TAKE ONE TABLET BY MOUTH EVERY DAY FOR 3 MONTHS.  sertraline (ZOLOFT) 100 MG tablet Take 1 tablet by mouth daily 90 tablet 1     No current facility-administered medications on file prior to visit.         Allergies   Allergen Reactions    Avelox [Moxifloxacin] Swelling    Cefzil [Cefprozil] Swelling    Augmentin [Amoxicillin-Pot Clavulanate] Nausea And Vomiting     Also non narcotic pain medication       Past Medical History:   Diagnosis Date    Abnormal Pap smear of cervix     repeat without issue    Anemia     Depression     Gestational diabetes     Took metformin    Hypothyroidism      Past Surgical History:   Procedure Laterality Date     SECTION N/A 2019     SECTION performed by Faisal Enriquez DO at [facility] OR    TYMPANOSTOMY TUBE PLACEMENT      WISDOM TOOTH EXTRACTION       Family History   Problem Relation Age of Onset    Diabetes Father     Hypothyroidism Father     Rheum Arthritis Mother     Breast Cancer Paternal Aunt         Social History     Socioeconomic History    Marital status: Single     Spouse name: Not on file    Number of children: Not on file    Years of education: Not on file    Highest education level: Not on file   Occupational History    Not on file   Social Needs    Financial resource strain: Not on file    Food insecurity     Worry: Not on file Inability: Not on file    Transportation needs     Medical: Not on file     Non-medical: Not on file   Tobacco Use    Smoking status: Never Smoker    Smokeless tobacco: Never Used   Substance and Sexual Activity    Alcohol use: Not Currently    Drug use: Never    Sexual activity: Yes     Partners: Male   Lifestyle    Physical activity     Days per week: Not on file     Minutes per session: Not on file    Stress: Not on file   Relationships    Social connections     Talks on phone: Not on file     Gets together: Not on file     Attends Protestant service: Not on file     Active member of club or organization: Not on file     Attends meetings of clubs or organizations: Not on file     Relationship status: Not on file    Intimate partner violence     Fear of current or ex partner: Not on file     Emotionally abused: Not on file     Physically abused: Not on file     Forced sexual activity: Not on file   Other Topics Concern    Not on file   Social History Narrative    Not on file       Vitals:    10/16/20 0825   BP: 132/86   Pulse: 107   Temp: 98.2 °F (36.8 °C)   TempSrc: Temporal   SpO2: 98%   Weight: 214 lb (97.1 kg)   Height: 5' (1.524 m)       Physical Exam:  Physical Exam  Vitals signs and nursing note reviewed. Constitutional:       General: She is not in acute distress. Appearance: Normal appearance. She is well-developed. She is obese. She is not ill-appearing. HENT:      Head: Normocephalic and atraumatic. Right Ear: Hearing and external ear normal.      Left Ear: Hearing and external ear normal.      Nose: Nose normal.      Mouth/Throat:      Mouth: Mucous membranes are moist.   Eyes:      General: Lids are normal. No scleral icterus. Extraocular Movements: Extraocular movements intact. Conjunctiva/sclera: Conjunctivae normal.   Neck:      Musculoskeletal: Normal range of motion and neck supple. Thyroid: Thyromegaly present. No thyroid mass or thyroid tenderness. Cardiovascular:      Rate and Rhythm: Normal rate and regular rhythm. Heart sounds: Normal heart sounds. No murmur. Pulmonary:      Effort: Pulmonary effort is normal. No respiratory distress. Breath sounds: Normal breath sounds. No wheezing. Musculoskeletal: Normal range of motion. General: No tenderness or deformity. Right lower leg: No edema. Left lower leg: No edema. Lymphadenopathy:      Cervical: No cervical adenopathy. Skin:     General: Skin is warm and dry. Findings: No rash. Neurological:      General: No focal deficit present. Mental Status: She is alert and oriented to person, place, and time. Psychiatric:         Mood and Affect: Mood and affect normal.         Speech: Speech normal.         Behavior: Behavior normal.         Thought Content:  Thought content normal.         Labs:  CBC with Differential:    Lab Results   Component Value Date    WBC 11.1 04/17/2020    RBC 4.54 04/17/2020    HGB 12.1 04/17/2020    HCT 38.0 04/17/2020     04/17/2020    MCV 83.7 04/17/2020    MCH 26.7 04/17/2020    MCHC 31.8 04/17/2020    RDW 14.5 04/17/2020    LYMPHOPCT 26.5 04/17/2020    MONOPCT 5.3 04/17/2020    BASOPCT 0.6 04/17/2020    MONOSABS 0.59 04/17/2020    LYMPHSABS 2.94 04/17/2020    EOSABS 0.35 04/17/2020    BASOSABS 0.07 04/17/2020     CMP:    Lab Results   Component Value Date     04/17/2020    K 4.2 04/17/2020     04/17/2020    CO2 22 04/17/2020    BUN 8 04/17/2020    CREATININE 0.7 04/17/2020    GFRAA >60 04/17/2020    LABGLOM >60 04/17/2020    GLUCOSE 91 07/22/2020    PROT 8.0 04/17/2020    LABALBU 4.3 04/17/2020    CALCIUM 9.4 04/17/2020    BILITOT <0.2 04/17/2020    ALKPHOS 87 04/17/2020    AST 18 04/17/2020    ALT 18 04/17/2020     HgBA1c:    Lab Results   Component Value Date    LABA1C 4.9 04/17/2020     FLP:    Lab Results   Component Value Date    TRIG 216 07/22/2020    HDL 34 07/22/2020    LDLCALC 140 07/22/2020    LABVLDL 70 04/17/2020     TSH:    Lab Results   Component Value Date    .700 07/16/2020        Assessment and Plan:  Dick Jean was seen today for thyroid problem. Diagnoses and all orders for this visit:    Hashimoto's thyroiditis  Patient states that she is taking medication regularly and on an empty stomach. TSH just continues to rise. WIll wait for lab results from today before reordering medication. May need US if not better on 200 mcg    Anxiety  Stable    Nausea  -     POCT urine pregnancy  -     omeprazole (PRILOSEC) 40 MG delayed release capsule; Take 1 capsule by mouth every morning (before breakfast)  Check urine preg, but may be due to stress. WIll try PPI temporarily. Return in about 3 months (around 1/16/2021) for Recheck.       Seen By:  Sabine Woodruff, DO

## 2020-10-19 VITALS — WEIGHT: 212 LBS | BODY MASS INDEX: 41.4 KG/M2

## 2020-10-19 RX ORDER — LEVOTHYROXINE SODIUM 0.2 MG/1
200 TABLET ORAL DAILY
Qty: 30 TABLET | Refills: 2 | Status: SHIPPED
Start: 2020-10-19 | End: 2021-07-22 | Stop reason: SDUPTHER

## 2020-10-19 NOTE — TELEPHONE ENCOUNTER
For some reason her medication was never sent to the pharmacy. Can you send this in?  Medications pended

## 2020-10-20 LAB — THYROID PEROXIDASE (TPO) ABS: 768.8 IU/ML (ref 0–9)

## 2020-10-23 ENCOUNTER — TELEPHONE (OUTPATIENT)
Dept: FAMILY MEDICINE CLINIC | Age: 30
End: 2020-10-23

## 2020-10-23 NOTE — TELEPHONE ENCOUNTER
Pt states she saw her thyroid results via my chart and is asking what the next steps would be. I did advise pt that once you reviewed the results we would contact her with recommendations.

## 2020-11-06 RX ORDER — SUCRALFATE 1 G/1
1 TABLET ORAL 4 TIMES DAILY
Qty: 120 TABLET | Refills: 3 | Status: SHIPPED
Start: 2020-11-06 | End: 2021-02-10

## 2020-11-20 ENCOUNTER — TELEPHONE (OUTPATIENT)
Dept: FAMILY MEDICINE CLINIC | Age: 30
End: 2020-11-20

## 2020-11-20 RX ORDER — BUPROPION HYDROCHLORIDE 150 MG/1
150 TABLET ORAL EVERY MORNING
Qty: 30 TABLET | Refills: 5 | Status: SHIPPED
Start: 2020-11-20 | End: 2021-06-02 | Stop reason: SDUPTHER

## 2020-11-21 NOTE — TELEPHONE ENCOUNTER
Patient having worsening depression symptoms. Discussed options. Will send Wellbutrin in addition to zoloft.

## 2020-12-04 ENCOUNTER — OFFICE VISIT (OUTPATIENT)
Dept: FAMILY MEDICINE CLINIC | Age: 30
End: 2020-12-04
Payer: COMMERCIAL

## 2020-12-04 VITALS
HEIGHT: 60 IN | RESPIRATION RATE: 18 BRPM | OXYGEN SATURATION: 98 % | HEART RATE: 112 BPM | WEIGHT: 214 LBS | BODY MASS INDEX: 42.01 KG/M2 | TEMPERATURE: 97.6 F

## 2020-12-04 PROCEDURE — 96372 THER/PROPH/DIAG INJ SC/IM: CPT | Performed by: PHYSICIAN ASSISTANT

## 2020-12-04 PROCEDURE — 99213 OFFICE O/P EST LOW 20 MIN: CPT | Performed by: PHYSICIAN ASSISTANT

## 2020-12-04 RX ORDER — METHYLPREDNISOLONE ACETATE 40 MG/ML
40 INJECTION, SUSPENSION INTRA-ARTICULAR; INTRALESIONAL; INTRAMUSCULAR; SOFT TISSUE ONCE
Status: COMPLETED | OUTPATIENT
Start: 2020-12-04 | End: 2020-12-04

## 2020-12-04 RX ORDER — PREDNISONE 20 MG/1
20 TABLET ORAL DAILY
Qty: 5 TABLET | Refills: 0 | Status: SHIPPED
Start: 2020-12-04 | End: 2020-12-07 | Stop reason: DRUGHIGH

## 2020-12-04 RX ADMIN — METHYLPREDNISOLONE ACETATE 40 MG: 40 INJECTION, SUSPENSION INTRA-ARTICULAR; INTRALESIONAL; INTRAMUSCULAR; SOFT TISSUE at 12:35

## 2020-12-04 NOTE — PROGRESS NOTES
Chief Complaint   Patient presents with    Rash     both legs. started last night. HPI: Is a pleasant 69-year-old female presents with a rash on front of her thighs down her legs. Started yesterday. Itchy. She felt like she might of ate something that she is allergic to but she is not sure what. Current Outpatient Medications:     predniSONE (DELTASONE) 20 MG tablet, Take 1 tablet by mouth daily for 5 days, Disp: 5 tablet, Rfl: 0    buPROPion (WELLBUTRIN XL) 150 MG extended release tablet, Take 1 tablet by mouth every morning, Disp: 30 tablet, Rfl: 5    sucralfate (CARAFATE) 1 GM tablet, Take 1 tablet by mouth 4 times daily, Disp: 120 tablet, Rfl: 3    levothyroxine (SYNTHROID) 200 MCG tablet, Take 1 tablet by mouth daily, Disp: 30 tablet, Rfl: 2    omeprazole (PRILOSEC) 40 MG delayed release capsule, Take 1 capsule by mouth every morning (before breakfast), Disp: 90 capsule, Rfl: 1    busPIRone (BUSPAR) 5 MG tablet, TAKE ONE TABLET BY MOUTH THREE TIMES A DAY, Disp: 90 tablet, Rfl: 5    Norgestim-Eth Estrad Triphasic 0.18/0.215/0.25 MG-35 MCG TABS, TAKE ONE TABLET BY MOUTH EVERY DAY FOR 3 MONTHS., Disp: , Rfl:     sertraline (ZOLOFT) 100 MG tablet, Take 1 tablet by mouth daily, Disp: 90 tablet, Rfl: 1       Allergies   Allergen Reactions    Avelox [Moxifloxacin] Swelling    Cefzil [Cefprozil] Swelling    Augmentin [Amoxicillin-Pot Clavulanate] Nausea And Vomiting     Also non narcotic pain medication         Review of Systems  Review of Systems   Skin: Positive for rash (Confluent rash on her thighs). All other systems reviewed and are negative. VS:  Pulse 112   Temp 97.6 °F (36.4 °C) (Temporal)   Resp 18   Ht 5' (1.524 m)   Wt 214 lb (97.1 kg)   SpO2 98%   BMI 41.79 kg/m²     Patient's medical, social, and family history reviewed      Physical Exam  Physical Exam  Vitals signs and nursing note reviewed. Constitutional:       General: She is not in acute distress.

## 2020-12-17 ENCOUNTER — OFFICE VISIT (OUTPATIENT)
Dept: PRIMARY CARE CLINIC | Age: 30
End: 2020-12-17
Payer: COMMERCIAL

## 2020-12-17 VITALS
BODY MASS INDEX: 42.01 KG/M2 | DIASTOLIC BLOOD PRESSURE: 82 MMHG | HEIGHT: 60 IN | TEMPERATURE: 98.7 F | RESPIRATION RATE: 20 BRPM | OXYGEN SATURATION: 98 % | HEART RATE: 123 BPM | WEIGHT: 214 LBS | SYSTOLIC BLOOD PRESSURE: 128 MMHG

## 2020-12-17 LAB
Lab: NORMAL
QC PASS/FAIL: NORMAL
SARS-COV-2, POC: DETECTED

## 2020-12-17 PROCEDURE — 99213 OFFICE O/P EST LOW 20 MIN: CPT | Performed by: FAMILY MEDICINE

## 2020-12-17 PROCEDURE — 87426 SARSCOV CORONAVIRUS AG IA: CPT | Performed by: FAMILY MEDICINE

## 2020-12-17 RX ORDER — ONDANSETRON 4 MG/1
4 TABLET, ORALLY DISINTEGRATING ORAL 3 TIMES DAILY PRN
Qty: 21 TABLET | Refills: 0 | Status: SHIPPED
Start: 2020-12-17 | End: 2021-02-10 | Stop reason: ALTCHOICE

## 2020-12-17 RX ORDER — BENZONATATE 200 MG/1
200 CAPSULE ORAL 3 TIMES DAILY PRN
Qty: 21 CAPSULE | Refills: 0 | Status: SHIPPED | OUTPATIENT
Start: 2020-12-17 | End: 2020-12-24

## 2020-12-17 NOTE — PROGRESS NOTES
12/17/2020    Chief Complaint   Patient presents with    Pharyngitis     sx x today    Cough    Nasal Congestion    Headache    Fatigue    Sweats     chills    Covid Testing       HPI    Dylan Mayorga is a 31 y.o. patient that presents today with:    Upper Respiratory Infection:  Patient complains of URI/sinusitis symptoms. Symptoms include:  Fever, Congestion and Headache . Onset of symptoms was 1 days ago, gradually worsening since that time. She is drinking plenty of fluids. Treatment to date: none with minimal ROS. Patient's past medical, surgical, social and/or family history reviewed, updated in chart, and are non-contributory (unless otherwise stated). Medications and allergies also reviewed and updated in chart.      ROS Unless otherwise specified  Review of Systems - General ROS: negative for - chills, fatigue, fever, night sweats, sleep disturbance, weight gain or weight loss  Psychological ROS: negative for - anxiety, behavioral disorder, depression, hallucinations, irritability, memory difficulties, mood swings, sleep disturbances or suicidal ideation  ENT ROS: negative for - epistaxis, headaches, hearing change, nasal congestion, nasal discharge, nasal polyps, sinus pain, tinnitus, vertigo or visual changes  Hematological and Lymphatic ROS: negative for - bleeding problems, blood clots, fatigue or swollen lymph nodes  Respiratory ROS: negative for - cough, orthopnea, shortness of breath, sputum changes, tachypnea or wheezing  Cardiovascular ROS: negative for - chest pain, dyspnea on exertion, irregular heartbeat, loss of consciousness, palpitations, paroxysmal nocturnal dyspnea or rapid heart rate  Gastrointestinal ROS: negative for - abdominal pain, blood in stools, change in bowel habits, constipation, diarrhea, gas/bloating, heartburn or nausea/vomiting Musculoskeletal ROS: negative for - joint pain, joint stiffness, joint swelling or muscle, back pain, bowel or bladder incontinence  Neurological ROS: negative for - behavioral changes, confusion, dizziness, headaches, memory loss, numbness/tingling, seizures or speech problems, weakness  Dermatological ROS: negative for - dry skin, mole changes, nail changes, pruritus, rash or skin lesion changes    Physical Exam  /82   Pulse 123   Temp 98.7 °F (37.1 °C) (Oral)   Resp 20   Ht 5' (1.524 m)   Wt 214 lb (97.1 kg)   LMP 11/18/2020 (Approximate)   SpO2 98%   BMI 41.79 kg/m²     Wt Readings from Last 3 Encounters:   12/17/20 214 lb (97.1 kg)   12/04/20 214 lb (97.1 kg)   10/16/20 214 lb (97.1 kg)     BP Readings from Last 3 Encounters:   12/17/20 128/82   10/16/20 132/86   07/17/20 128/72         General appearance: alert, well appearing, and in no distress, oriented to person, place, and time and normal appearing weight. HEENT:  HEAD: Atraumatic, normocephalic  EYES: PERRL  EOM intact  EARS: bilateral TM's and external ear canals normal  NOSE: nasal mucosa, septum, turbinates normal bilaterally  MOUTH: mucous membranes moist and normal tonsils  NECK: supple, full range of motion, no mass, normal lymphadenopathy, no thyromegaly    CVS exam: normal rate, regular rhythm, normal S1, S2, no murmurs, rubs, clicks or gallops. Radial pulses 2+ bilateral.  PT/DP pulse 2+ bilat. No C/C/E    Chest: clear to auscultation, no wheezes, rales or rhonchi, symmetric air entry. SKIN: no lesions, jaundice, petechiae, pallor, cyanosis, ecchymosis        Assessment/Plan  Charlotte Jo was seen today for pharyngitis, cough, nasal congestion, headache, fatigue, sweats and covid testing.     Diagnoses and all orders for this visit:    Encounter for laboratory testing for COVID-19 virus  -     POCT COVID-19, Antigen    COVID-19 virus infection    Other orders

## 2021-01-08 ENCOUNTER — OFFICE VISIT (OUTPATIENT)
Dept: FAMILY MEDICINE CLINIC | Age: 31
End: 2021-01-08
Payer: COMMERCIAL

## 2021-01-08 VITALS
SYSTOLIC BLOOD PRESSURE: 132 MMHG | HEART RATE: 113 BPM | HEIGHT: 60 IN | BODY MASS INDEX: 42.01 KG/M2 | OXYGEN SATURATION: 98 % | DIASTOLIC BLOOD PRESSURE: 84 MMHG | WEIGHT: 214 LBS | TEMPERATURE: 97.5 F

## 2021-01-08 DIAGNOSIS — R50.9 FEVER, UNSPECIFIED FEVER CAUSE: ICD-10-CM

## 2021-01-08 DIAGNOSIS — R10.9 FLANK PAIN: ICD-10-CM

## 2021-01-08 DIAGNOSIS — R35.0 URINARY FREQUENCY: ICD-10-CM

## 2021-01-08 DIAGNOSIS — R35.0 URINARY FREQUENCY: Primary | ICD-10-CM

## 2021-01-08 LAB
BILIRUBIN, POC: NEGATIVE
BLOOD URINE, POC: NORMAL
CLARITY, POC: NORMAL
COLOR, POC: NORMAL
CONTROL: NORMAL
GLUCOSE URINE, POC: NEGATIVE
KETONES, POC: NEGATIVE
LEUKOCYTE EST, POC: NEGATIVE
NITRITE, POC: NEGATIVE
PH, POC: 6
PREGNANCY TEST URINE, POC: NEGATIVE
PROTEIN, POC: NORMAL
SPECIFIC GRAVITY, POC: 1.03
UROBILINOGEN, POC: 0.2

## 2021-01-08 PROCEDURE — 81025 URINE PREGNANCY TEST: CPT | Performed by: INTERNAL MEDICINE

## 2021-01-08 PROCEDURE — 99213 OFFICE O/P EST LOW 20 MIN: CPT | Performed by: INTERNAL MEDICINE

## 2021-01-08 PROCEDURE — 81002 URINALYSIS NONAUTO W/O SCOPE: CPT | Performed by: INTERNAL MEDICINE

## 2021-01-08 RX ORDER — SULFAMETHOXAZOLE AND TRIMETHOPRIM 800; 160 MG/1; MG/1
1 TABLET ORAL 2 TIMES DAILY
Qty: 20 TABLET | Refills: 0 | Status: SHIPPED | OUTPATIENT
Start: 2021-01-08 | End: 2021-01-18

## 2021-01-08 ASSESSMENT — ENCOUNTER SYMPTOMS
DIARRHEA: 0
ABDOMINAL PAIN: 1
BLOOD IN STOOL: 0
VOMITING: 0
NAUSEA: 0
CONSTIPATION: 0

## 2021-01-09 NOTE — PROGRESS NOTES
[facility]     21  Nani Puri : 1990 Sex: female  Age: 31 y.o. Chief Complaint   Patient presents with    Urinary Tract Infection     lower back pain for a couple days; frequency; sharp shooting pain RLQ       HPI    Patient presents to express care today complaining of urinary frequency with some right-sided abdominal and back pain. States frequency started 4 days ago the right-sided pain started 2 days ago. States she had low-grade temperature denies dysuria and hematuria. Denies any nausea vomiting or diarrhea. Denies chills. Review of Systems   Constitutional: Positive for fever. Negative for chills. Gastrointestinal: Positive for abdominal pain. Negative for blood in stool, constipation, diarrhea, nausea and vomiting. Genitourinary: Positive for flank pain and frequency. Negative for dysuria, hematuria, pelvic pain and vaginal discharge. REST OF PERTINENT ROS GONE OVER AND WAS NEGATIVE. Current Outpatient Medications:     sulfamethoxazole-trimethoprim (BACTRIM DS;SEPTRA DS) 800-160 MG per tablet, Take 1 tablet by mouth 2 times daily for 10 days, Disp: 20 tablet, Rfl: 0    ondansetron (ZOFRAN-ODT) 4 MG disintegrating tablet, Take 1 tablet by mouth 3 times daily as needed for Nausea or Vomiting, Disp: 21 tablet, Rfl: 0    predniSONE (DELTASONE) 10 MG tablet, 6 tabs PO x3 d, then 5 tabs PO x3 d, then 4 tabs PO x3 d, then 3 tabs PO x3 d, then 2 tabs PO x3 d, then 1 tab PO x3 d, then 0.5 tab PO x4 d, Disp: 65 tablet, Rfl: 0    triamcinolone (ARISTOCORT) 0.5 % cream, Apply topically 3 times daily. , Disp: 15 g, Rfl: 1    buPROPion (WELLBUTRIN XL) 150 MG extended release tablet, Take 1 tablet by mouth every morning, Disp: 30 tablet, Rfl: 5    sucralfate (CARAFATE) 1 GM tablet, Take 1 tablet by mouth 4 times daily, Disp: 120 tablet, Rfl: 3    levothyroxine (SYNTHROID) 200 MCG tablet, Take 1 tablet by mouth daily, Disp: 30 tablet, Rfl: 2   omeprazole (PRILOSEC) 40 MG delayed release capsule, Take 1 capsule by mouth every morning (before breakfast), Disp: 90 capsule, Rfl: 1    Norgestim-Eth Estrad Triphasic 0.18/0.215/0.25 MG-35 MCG TABS, TAKE ONE TABLET BY MOUTH EVERY DAY FOR 3 MONTHS., Disp: , Rfl:     sertraline (ZOLOFT) 100 MG tablet, Take 1 tablet by mouth daily, Disp: 90 tablet, Rfl: 1  Allergies   Allergen Reactions    Avelox [Moxifloxacin] Swelling    Cefzil [Cefprozil] Swelling    Augmentin [Amoxicillin-Pot Clavulanate] Nausea And Vomiting     Also non narcotic pain medication       Past Medical History:   Diagnosis Date    Abnormal Pap smear of cervix     repeat without issue    Anemia     Depression     Gestational diabetes     Took metformin    Hypothyroidism      Past Surgical History:   Procedure Laterality Date     SECTION N/A 2019     SECTION performed by Lonny Kay DO at [facility]    TYMPANOSTOMY TUBE PLACEMENT      WISDOM TOOTH EXTRACTION       Family History   Problem Relation Age of Onset    Diabetes Father     Hypothyroidism Father    [place] Rheum Arthritis Mother     Breast Cancer Paternal Aunt      Social History     Socioeconomic History    Marital status: Single     Spouse name: Not on file    Number of children: Not on file    Years of education: Not on file    Highest education level: Not on file   Occupational History    Not on file   Social Needs    Financial resource strain: Not on file    Food insecurity     Worry: Not on file     Inability: Not on file   [employer] needs     Medical: Not on file     Non-medical: Not on file   Tobacco Use    Smoking status: Never Smoker    Smokeless tobacco: Never Used   Substance and Sexual Activity    Alcohol use: Not Currently    Drug use: Never    Sexual activity: Yes     Partners: Male   Lifestyle    Physical activity     Days per week: Not on file     Minutes per session: Not on file    Stress: Not on file Relationships    Social connections     Talks on phone: Not on file     Gets together: Not on file     Attends Latter day service: Not on file     Active member of club or organization: Not on file     Attends meetings of clubs or organizations: Not on file     Relationship status: Not on file    Intimate partner violence     Fear of current or ex partner: Not on file     Emotionally abused: Not on file     Physically abused: Not on file     Forced sexual activity: Not on file   Other Topics Concern    Not on file   Social History Narrative    Not on file       Vitals:    01/08/21 0841   BP: 132/84   Pulse: 113   Temp: 97.5 °F (36.4 °C)   TempSrc: Temporal   SpO2: 98%   Weight: 214 lb (97.1 kg)   Height: 5' (1.524 m)       Physical Exam  Vitals signs and nursing note reviewed. Constitutional:       General: She is not in acute distress. Appearance: She is not ill-appearing. Cardiovascular:      Rate and Rhythm: Regular rhythm. Tachycardia present. Heart sounds: No murmur. Pulmonary:      Effort: Pulmonary effort is normal. No respiratory distress. Breath sounds: No wheezing, rhonchi or rales. Abdominal:      General: Bowel sounds are normal.      Palpations: Abdomen is soft. Tenderness: There is abdominal tenderness. There is right CVA tenderness. There is no left CVA tenderness, guarding or rebound. Comments: Patient did have some suprapubic tenderness to palpation. She also has mild right flank discomfort to percussion. I cannot elicit right lower quadrant pain to palpation. Neurological:      Mental Status: She is alert and oriented to person, place, and time. Psychiatric:         Mood and Affect: Mood normal.         Behavior: Behavior normal.         Thought Content: Thought content normal.         Judgment: Judgment normal.                 Assessment and Plan:  Pola Guajardo was seen today for urinary tract infection.     Diagnoses and all orders for this visit: Urinary frequency  -     POCT Urinalysis no Micro  -     POCT urine pregnancy  -     Culture, Urine; Future    Fever, unspecified fever cause  -     POCT urine pregnancy  -     Culture, Urine; Future    Flank pain  -     POCT urine pregnancy  -     Culture, Urine; Future    Other orders  -     sulfamethoxazole-trimethoprim (BACTRIM DS;SEPTRA DS) 800-160 MG per tablet; Take 1 tablet by mouth 2 times daily for 10 days      Plan: Although urine dip did not demonstrate leukocyte or nitrite she did have suprapubic discomfort frequency and right flank discomfort. I will empirically start her on Bactrim DS and send urine for culture and sensitivity. Watch for any high fevers or change in her discomfort. I told her she would at that point need to go to the emergency room over the weekend. Fluids. Probiotic. Urine pregnancy was negative. Follow with PCP. Notify us of problems in the interim. Return for fu pcp. Seen By:  Baltazar Boyd MD      *Document was created using voice recognition software. Note was reviewed however may contain grammatical errors.

## 2021-01-11 ENCOUNTER — TELEPHONE (OUTPATIENT)
Dept: FAMILY MEDICINE CLINIC | Age: 31
End: 2021-01-11

## 2021-01-11 LAB — URINE CULTURE, ROUTINE: NORMAL

## 2021-01-12 NOTE — TELEPHONE ENCOUNTER
I think this area should be reassessed and should get into see Dr. Naila Wooten. . May need some labs and/or x-ray

## 2021-01-12 NOTE — TELEPHONE ENCOUNTER
Pt advised. She said that she is still experiencing some lower back pain. Should I have her give another sample and send it to the lab?

## 2021-02-02 RX ORDER — SERTRALINE HYDROCHLORIDE 100 MG/1
TABLET, FILM COATED ORAL
Qty: 90 TABLET | Refills: 1 | Status: SHIPPED
Start: 2021-02-02 | End: 2021-02-10 | Stop reason: SDUPTHER

## 2021-02-02 NOTE — TELEPHONE ENCOUNTER
Last Appointment:  10/16/2020  Future Appointments   Date Time Provider Toy Andres   2/10/2021  8:15 AM DO AD[facility]

## 2021-02-10 ENCOUNTER — OFFICE VISIT (OUTPATIENT)
Dept: PRIMARY CARE CLINIC | Age: 31
End: 2021-02-10
Payer: COMMERCIAL

## 2021-02-10 VITALS
WEIGHT: 212 LBS | OXYGEN SATURATION: 98 % | BODY MASS INDEX: 41.62 KG/M2 | DIASTOLIC BLOOD PRESSURE: 72 MMHG | HEART RATE: 96 BPM | HEIGHT: 60 IN | SYSTOLIC BLOOD PRESSURE: 122 MMHG | TEMPERATURE: 98 F

## 2021-02-10 DIAGNOSIS — E06.3 HASHIMOTO'S THYROIDITIS: Primary | ICD-10-CM

## 2021-02-10 DIAGNOSIS — Z86.32 HISTORY OF GESTATIONAL DIABETES: ICD-10-CM

## 2021-02-10 DIAGNOSIS — E78.2 MIXED HYPERLIPIDEMIA: ICD-10-CM

## 2021-02-10 DIAGNOSIS — F41.9 ANXIETY: ICD-10-CM

## 2021-02-10 DIAGNOSIS — D50.8 OTHER IRON DEFICIENCY ANEMIA: ICD-10-CM

## 2021-02-10 DIAGNOSIS — E55.9 VITAMIN D INSUFFICIENCY: ICD-10-CM

## 2021-02-10 PROCEDURE — 99214 OFFICE O/P EST MOD 30 MIN: CPT | Performed by: FAMILY MEDICINE

## 2021-02-10 RX ORDER — SERTRALINE HYDROCHLORIDE 100 MG/1
150 TABLET, FILM COATED ORAL DAILY
Qty: 135 TABLET | Refills: 1 | Status: SHIPPED
Start: 2021-02-10 | End: 2021-07-22

## 2021-02-10 ASSESSMENT — ENCOUNTER SYMPTOMS
NAUSEA: 0
BACK PAIN: 0
DIARRHEA: 0
VOMITING: 0
CONSTIPATION: 0
SHORTNESS OF BREATH: 0
ABDOMINAL PAIN: 0
COUGH: 0
WHEEZING: 0

## 2021-02-10 ASSESSMENT — PATIENT HEALTH QUESTIONNAIRE - PHQ9
2. FEELING DOWN, DEPRESSED OR HOPELESS: 1
1. LITTLE INTEREST OR PLEASURE IN DOING THINGS: 1
SUM OF ALL RESPONSES TO PHQ QUESTIONS 1-9: 2

## 2021-02-10 NOTE — PROGRESS NOTES
2/10/21  Alexia Short : 1990 Sex: female  Age: 31 y.o. Chief Complaint   Patient presents with    Thyroid Problem     HPI:  31 y.o. female presents today for 6 month follow up of chronic medical conditions, medication refills and FBW. Patient's chart, medical, surgical and medication history all reviewed. Hypothyroidism  Patient presents for routine follow up of Hypothyroidism. Current symptoms: change in energy level and nervousness. Patient denies diarrhea, heat / cold intolerance, palpitations and weight changes. Symptoms have progressed to a point and plateaued. No difficulty swallowing or masses felt. Last TSH was 29.640 (2020)---126.70 (2020)--35.600 (2020)--10.3 (2020). TPO was 768.8. Patient is currently taking Levothyroxine 200 mcg with two tablets on . She started following with Dr. Rosalie Kauffman and is due for repeat labs in March. Anxiety  Patient complains of evaluation of anxiety disorder and panic attacks. She has the following anxiety symptoms: difficulty concentrating, feelings of losing control, racing thoughts. Onset of symptoms was approximately several months ago, gradually worsening since that time. She denies current suicidal and homicidal ideation. Possible organic causes contributing are: endocrine/metabolic. Risk factors: previous episode of depression. Previous treatment includes Zoloft and no therapy. She complains of the following side effects from the treatment: none. Wellbutrin was added in the interim- noticed a difference in moods since starting. Still having episodes of irritability and short fuse. ROS:  Review of Systems   Constitutional: Positive for fatigue. Negative for chills and fever. Respiratory: Negative for cough, shortness of breath and wheezing. Cardiovascular: Negative for chest pain and palpitations. Gastrointestinal: Negative for abdominal pain, constipation, diarrhea, nausea and vomiting. Musculoskeletal: Negative for arthralgias and back pain. Skin: Negative for rash. Neurological: Negative for dizziness and headaches. Psychiatric/Behavioral: Negative for dysphoric mood. The patient is nervous/anxious. All other systems reviewed and are negative. Current Outpatient Medications on File Prior to Visit   Medication Sig Dispense Refill    buPROPion (WELLBUTRIN XL) 150 MG extended release tablet Take 1 tablet by mouth every morning 30 tablet 5    levothyroxine (SYNTHROID) 200 MCG tablet Take 1 tablet by mouth daily 30 tablet 2    Norgestim-Eth Estrad Triphasic 0.18/0.215/0.25 MG-35 MCG TABS TAKE ONE TABLET BY MOUTH EVERY DAY FOR 3 MONTHS. No current facility-administered medications on file prior to visit.         Allergies   Allergen Reactions    Avelox [Moxifloxacin] Swelling    Cefzil [Cefprozil] Swelling    Augmentin [Amoxicillin-Pot Clavulanate] Nausea And Vomiting     Also non narcotic pain medication       Past Medical History:   Diagnosis Date    Abnormal Pap smear of cervix     repeat without issue    Anemia     Depression     Gestational diabetes     Took metformin    Hypothyroidism      Past Surgical History:   Procedure Laterality Date     SECTION N/A 2019     SE[facility] Valentino [facility]he[facility]    TYMPANOSTOMY TUBE PLACEMENT      WISDOM TOOTH EXTRACTION       Family History   Problem Relation Age of Onset    Diabetes Father     Hypothyroidism Father     Rheum Arthritis Mother     Breast Cancer Paternal Aunt         Social History     Socioeconomic History    Marital status: Single     Spouse name: Not on file    Number of children: Not on file    Years of education: Not on file    Highest education level: Not on file   Occupational History    Not on file   Social Needs    Financial resource strain: Not on file    Food insecurity     Worry: Not on file     Inability: Not on file   [employer] needs     Medical: Not on file     Non-medical: Not on file   Tobacco Use    Smoking status: Never Smoker    Smokeless tobacco: Never Used   Substance and Sexual Activity    Alcohol use: Not Currently    Drug use: Never    Sexual activity: Yes     Partners: Male   Lifestyle    Physical activity     Days per week: Not on file     Minutes per session: Not on file    Stress: Not on file   Relationships    Social connections     Talks on phone: Not on file     Gets together: Not on file     Attends Worship service: Not on file     Active member of club or organization: Not on file     Attends meetings of clubs or organizations: Not on file     Relationship status: Not on file    Intimate partner violence     Fear of current or ex partner: Not on file     Emotionally abused: Not on file     Physically abused: Not on file     Forced sexual activity: Not on file   Other Topics Concern    Not on file   Social History Narrative    Not on file       Vitals:    02/10/21 0814   BP: 122/72   Pulse: 96   Temp: 98 °F (36.7 °C)   SpO2: 98%   Weight: 212 lb (96.2 kg)   Height: 5' (1.524 m)       Physical Exam:  Physical Exam  Vitals signs and nursing note reviewed. Constitutional:       General: She is not in acute distress. Appearance: Normal appearance. She is well-developed. She is obese. She is not ill-appearing. HENT:      Head: Normocephalic and atraumatic. Right Ear: Hearing and external ear normal.      Left Ear: Hearing and external ear normal.      Nose: Nose normal.      Mouth/Throat:      Mouth: Mucous membranes are moist.   Eyes:      General: Lids are normal. No scleral icterus. Extraocular Movements: Extraocular movements intact. Conjunctiva/sclera: Conjunctivae normal.   Neck:      Musculoskeletal: Normal range of motion and neck supple. Thyroid: Thyromegaly present. No thyroid mass or thyroid tenderness. Cardiovascular:      Rate and Rhythm: Normal rate and regular rhythm. Heart sounds: Normal heart sounds. No murmur. Pulmonary:      Effort: Pulmonary effort is normal. No respiratory distress. Breath sounds: Normal breath sounds. No wheezing. Musculoskeletal: Normal range of motion. General: No tenderness or deformity. Right lower leg: No edema. Left lower leg: No edema. Lymphadenopathy:      Cervical: No cervical adenopathy. Skin:     General: Skin is warm and dry. Findings: No rash. Neurological:      General: No focal deficit present. Mental Status: She is alert and oriented to person, place, and time. Psychiatric:         Mood and Affect: Mood and affect normal.         Speech: Speech normal.         Behavior: Behavior normal.         Thought Content:  Thought content normal.         Labs:  CBC with Differential:    Lab Results   Component Value Date    WBC 11.1 04/17/2020    RBC 4.54 04/17/2020    HGB 12.1 04/17/2020    HCT 38.0 04/17/2020     04/17/2020    MCV 83.7 04/17/2020    MCH 26.7 04/17/2020    MCHC 31.8 04/17/2020    RDW 14.5 04/17/2020    LYMPHOPCT 26.5 04/17/2020    MONOPCT 5.3 04/17/2020    BASOPCT 0.6 04/17/2020    MONOSABS 0.59 04/17/2020    LYMPHSABS 2.94 04/17/2020    EOSABS 0.35 04/17/2020    BASOSABS 0.07 04/17/2020     CMP:    Lab Results   Component Value Date     04/17/2020    K 4.2 04/17/2020     04/17/2020    CO2 22 04/17/2020    BUN 8 04/17/2020    CREATININE 0.7 04/17/2020    GFRAA >60 04/17/2020    LABGLOM >60 04/17/2020    GLUCOSE 91 07/22/2020    PROT 8.0 04/17/2020    LABALBU 4.3 04/17/2020    CALCIUM 9.4 04/17/2020    BILITOT <0.2 04/17/2020    ALKPHOS 87 04/17/2020    AST 18 04/17/2020    ALT 18 04/17/2020     HgBA1c:    Lab Results   Component Value Date    LABA1C 4.9 04/17/2020     FLP:    Lab Results   Component Value Date    TRIG 216 07/22/2020    HDL 34 07/22/2020    LDLCALC 140 07/22/2020    LABVLDL 70 04/17/2020     TSH:    Lab Results   Component Value Date    TSH 29.640 10/16/2020          Assessment and Plan:  Jose Romero was seen today for thyroid problem. Diagnoses and all orders for this visit:    Hashimoto's thyroiditis  -     CBC Auto Differential; Future  -     Comprehensive Metabolic Panel; Future  -     TSH without Reflex; Future  -     Urinalysis; Future  -     Thyroid Peroxidase Antibody; Future  -     T4, Free; Future  Due for repeat labs at this time. Following with Jossie, so will combine our two labs and have done in March. Anxiety  -     sertraline (ZOLOFT) 100 MG tablet; Take 1.5 tablets by mouth daily  Still having some mild issues. Will increase Zoloft to 150 mg first.  If not significantly helpful, will increase Wellbutrin. Mixed hyperlipidemia  -     Lipid Panel; Future    Vitamin D insufficiency  -     Vitamin D 25 Hydroxy; Future    Other iron deficiency anemia  -     Iron and TIBC; Future  -     Ferritin; Future    History of gestational diabetes  -     Hemoglobin A1C; Future          Return in about 6 months (around 8/10/2021) for Recheck.       Seen By:  Manoj Kelly DO

## 2021-02-15 ENCOUNTER — OFFICE VISIT (OUTPATIENT)
Dept: FAMILY MEDICINE CLINIC | Age: 31
End: 2021-02-15
Payer: COMMERCIAL

## 2021-02-15 VITALS
OXYGEN SATURATION: 97 % | HEART RATE: 126 BPM | BODY MASS INDEX: 41.4 KG/M2 | WEIGHT: 212 LBS | SYSTOLIC BLOOD PRESSURE: 134 MMHG | DIASTOLIC BLOOD PRESSURE: 88 MMHG | TEMPERATURE: 98.8 F

## 2021-02-15 DIAGNOSIS — J02.0 ACUTE STREPTOCOCCAL PHARYNGITIS: Primary | ICD-10-CM

## 2021-02-15 DIAGNOSIS — J01.10 ACUTE NON-RECURRENT FRONTAL SINUSITIS: ICD-10-CM

## 2021-02-15 LAB — S PYO AG THROAT QL: NORMAL

## 2021-02-15 PROCEDURE — 87880 STREP A ASSAY W/OPTIC: CPT | Performed by: FAMILY MEDICINE

## 2021-02-15 PROCEDURE — 99213 OFFICE O/P EST LOW 20 MIN: CPT | Performed by: FAMILY MEDICINE

## 2021-02-15 RX ORDER — AMOXICILLIN 250 MG/1
250 CAPSULE ORAL 3 TIMES DAILY
Qty: 30 CAPSULE | Refills: 0 | Status: SHIPPED | OUTPATIENT
Start: 2021-02-15 | End: 2021-02-25

## 2021-02-15 ASSESSMENT — ENCOUNTER SYMPTOMS
RHINORRHEA: 1
SINUS PRESSURE: 1
SINUS PAIN: 1
RESPIRATORY NEGATIVE: 1
EYES NEGATIVE: 1
SORE THROAT: 1

## 2021-02-15 NOTE — PROGRESS NOTES
2/15/21  Adra Muss : 1990 Sex: female  Age: 31 y.o. Chief Complaint   Patient presents with    Pharyngitis    Congestion       Patient is a 25-year-old white female with a chief complaint of postnasal congestion and sore throat. No fevers chills cough shortness of breath no loss of smell or taste no nausea vomiting or diarrhea no fever. Review of Systems   Constitutional: Negative. HENT: Positive for congestion, postnasal drip, rhinorrhea, sinus pressure, sinus pain and sore throat. Eyes: Negative. Respiratory: Negative. Cardiovascular: Negative.           Current Outpatient Medications:     amoxicillin (AMOXIL) 250 MG capsule, Take 1 capsule by mouth 3 times daily for 10 days, Disp: 30 capsule, Rfl: 0    sertraline (ZOLOFT) 100 MG tablet, Take 1.5 tablets by mouth daily, Disp: 135 tablet, Rfl: 1    buPROPion (WELLBUTRIN XL) 150 MG extended release tablet, Take 1 tablet by mouth every morning, Disp: 30 tablet, Rfl: 5    levothyroxine (SYNTHROID) 200 MCG tablet, Take 1 tablet by mouth daily, Disp: 30 tablet, Rfl: 2    Norgestim-Eth Estrad Triphasic 0.18/0.215/0.25 MG-35 MCG TABS, TAKE ONE TABLET BY MOUTH EVERY DAY FOR 3 MONTHS., Disp: , Rfl:   Allergies   Allergen Reactions    Avelox [Moxifloxacin] Swelling    Cefzil [Cefprozil] Swelling    Augmentin [Amoxicillin-Pot Clavulanate] Nausea And Vomiting     Also non narcotic pain medication       Past Medical History:   Diagnosis Date    Abnormal Pap smear of cervix     repeat without issue    Anemia     Depression     Gestational diabetes     Took metformin    Hypothyroidism      Past Surgical History:   Procedure Laterality Date     SECTION N/A 2019     SE[facility]del[facility]    TYMPANOSTOMY TUBE PLACEMENT      WISDOM TOOTH EXTRACTION       Family History   Problem Relation Age of Onset    Diabetes Father     Hypothyroidism Father  Rheum Arthritis Mother     Breast Cancer Paternal Aunt      Social History     Tobacco Use    Smoking status: Never Smoker    Smokeless tobacco: Never Used   Substance Use Topics    Alcohol use: Not Currently    Drug use: Never        Vitals:    02/15/21 1146   BP: 134/88   Pulse: 126   Temp: 98.8 °F (37.1 °C)   SpO2: 97%   Weight: 212 lb (96.2 kg)       Physical Exam  Vitals signs reviewed. Constitutional:       Appearance: Normal appearance. HENT:      Head: Normocephalic and atraumatic. Right Ear: Tympanic membrane, ear canal and external ear normal. There is impacted cerumen. Left Ear: Tympanic membrane, ear canal and external ear normal. There is no impacted cerumen. Nose: Congestion and rhinorrhea present. Mouth/Throat:      Mouth: Mucous membranes are moist.      Pharynx: Posterior oropharyngeal erythema present. Eyes:      Conjunctiva/sclera: Conjunctivae normal.   Neck:      Musculoskeletal: No muscular tenderness. Cardiovascular:      Rate and Rhythm: Normal rate and regular rhythm. Heart sounds: No murmur. Pulmonary:      Effort: Pulmonary effort is normal.      Breath sounds: Normal breath sounds. Lymphadenopathy:      Cervical: No cervical adenopathy. Neurological:      Mental Status: She is alert. Assessment and Plan:  Jamila Barajas was seen today for pharyngitis and congestion. Diagnoses and all orders for this visit:    Acute streptococcal pharyngitis  -     POCT rapid strep A    Acute non-recurrent frontal sinusitis    Other orders  -     amoxicillin (AMOXIL) 250 MG capsule; Take 1 capsule by mouth 3 times daily for 10 days        Orders Placed This Encounter   Medications    amoxicillin (AMOXIL) 250 MG capsule     Sig: Take 1 capsule by mouth 3 times daily for 10 days     Dispense:  30 capsule     Refill:  0        Patient advised to follow up with PCP as needed.         Seen By:  Junior Padilla DO We treated her with Amoxil and since here is eating 10 mg daily for nasal congestion she does not appear to have a rash with penicillin but rather had a GI upset secondary to Augmentin. Force fluids Tylenol and Advil. Vira Libman

## 2021-02-19 ENCOUNTER — TELEPHONE (OUTPATIENT)
Dept: FAMILY MEDICINE CLINIC | Age: 31
End: 2021-02-19

## 2021-02-19 RX ORDER — PREDNISONE 10 MG/1
TABLET ORAL
Qty: 12 TABLET | Refills: 0 | Status: SHIPPED | OUTPATIENT
Start: 2021-02-19 | End: 2021-02-25

## 2021-02-19 RX ORDER — DOXYCYCLINE HYCLATE 100 MG
100 TABLET ORAL 2 TIMES DAILY
Qty: 20 TABLET | Refills: 0 | Status: SHIPPED | OUTPATIENT
Start: 2021-02-19 | End: 2021-03-01

## 2021-06-02 RX ORDER — BUPROPION HYDROCHLORIDE 150 MG/1
150 TABLET ORAL EVERY MORNING
Qty: 30 TABLET | Refills: 5 | Status: SHIPPED
Start: 2021-06-02 | End: 2021-07-22 | Stop reason: SDUPTHER

## 2021-06-02 NOTE — TELEPHONE ENCOUNTER
Last Appointment:  2/10/2021  Future Appointments   Date Time Provider Toy Andres   8/13/2021  8:15 AM DO ELIZABETH Holliday [facility]

## 2021-07-21 DIAGNOSIS — Z86.32 HISTORY OF GESTATIONAL DIABETES: ICD-10-CM

## 2021-07-21 DIAGNOSIS — D50.8 OTHER IRON DEFICIENCY ANEMIA: ICD-10-CM

## 2021-07-21 DIAGNOSIS — E55.9 VITAMIN D INSUFFICIENCY: ICD-10-CM

## 2021-07-21 DIAGNOSIS — E06.3 HASHIMOTO'S THYROIDITIS: ICD-10-CM

## 2021-07-21 DIAGNOSIS — E78.2 MIXED HYPERLIPIDEMIA: ICD-10-CM

## 2021-07-21 LAB
ALBUMIN SERPL-MCNC: 4.6 G/DL (ref 3.5–5.2)
ALP BLD-CCNC: 81 U/L (ref 35–104)
ALT SERPL-CCNC: 15 U/L (ref 0–32)
ANION GAP SERPL CALCULATED.3IONS-SCNC: 13 MMOL/L (ref 7–16)
AST SERPL-CCNC: 21 U/L (ref 0–31)
BASOPHILS ABSOLUTE: 0.06 E9/L (ref 0–0.2)
BASOPHILS RELATIVE PERCENT: 0.6 % (ref 0–2)
BILIRUB SERPL-MCNC: 0.6 MG/DL (ref 0–1.2)
BILIRUBIN URINE: NEGATIVE
BLOOD, URINE: NEGATIVE
BUN BLDV-MCNC: 6 MG/DL (ref 6–20)
CALCIUM SERPL-MCNC: 9.1 MG/DL (ref 8.6–10.2)
CHLORIDE BLD-SCNC: 103 MMOL/L (ref 98–107)
CHOLESTEROL, TOTAL: 189 MG/DL (ref 0–199)
CLARITY: CLEAR
CO2: 23 MMOL/L (ref 22–29)
COLOR: YELLOW
CREAT SERPL-MCNC: 0.8 MG/DL (ref 0.5–1)
EOSINOPHILS ABSOLUTE: 0.28 E9/L (ref 0.05–0.5)
EOSINOPHILS RELATIVE PERCENT: 2.7 % (ref 0–6)
FERRITIN: 9 NG/ML
GFR AFRICAN AMERICAN: >60
GFR NON-AFRICAN AMERICAN: >60 ML/MIN/1.73
GLUCOSE BLD-MCNC: 83 MG/DL (ref 74–99)
GLUCOSE URINE: NEGATIVE MG/DL
HBA1C MFR BLD: 5.3 % (ref 4–5.6)
HCT VFR BLD CALC: 37.6 % (ref 34–48)
HDLC SERPL-MCNC: 37 MG/DL
HEMOGLOBIN: 11.3 G/DL (ref 11.5–15.5)
IMMATURE GRANULOCYTES #: 0.05 E9/L
IMMATURE GRANULOCYTES %: 0.5 % (ref 0–5)
IRON SATURATION: 12 % (ref 15–50)
IRON: 49 MCG/DL (ref 37–145)
KETONES, URINE: NEGATIVE MG/DL
LDL CHOLESTEROL CALCULATED: 119 MG/DL (ref 0–99)
LEUKOCYTE ESTERASE, URINE: NEGATIVE
LYMPHOCYTES ABSOLUTE: 2.18 E9/L (ref 1.5–4)
LYMPHOCYTES RELATIVE PERCENT: 20.8 % (ref 20–42)
MCH RBC QN AUTO: 23.6 PG (ref 26–35)
MCHC RBC AUTO-ENTMCNC: 30.1 % (ref 32–34.5)
MCV RBC AUTO: 78.7 FL (ref 80–99.9)
MONOCYTES ABSOLUTE: 0.54 E9/L (ref 0.1–0.95)
MONOCYTES RELATIVE PERCENT: 5.1 % (ref 2–12)
NEUTROPHILS ABSOLUTE: 7.39 E9/L (ref 1.8–7.3)
NEUTROPHILS RELATIVE PERCENT: 70.3 % (ref 43–80)
NITRITE, URINE: NEGATIVE
PDW BLD-RTO: 16.2 FL (ref 11.5–15)
PH UA: 8 (ref 5–9)
PLATELET # BLD: 398 E9/L (ref 130–450)
PMV BLD AUTO: 10.3 FL (ref 7–12)
POTASSIUM SERPL-SCNC: 4 MMOL/L (ref 3.5–5)
PROTEIN UA: NEGATIVE MG/DL
RBC # BLD: 4.78 E12/L (ref 3.5–5.5)
SODIUM BLD-SCNC: 139 MMOL/L (ref 132–146)
SPECIFIC GRAVITY UA: 1.02 (ref 1–1.03)
T4 FREE: 0.57 NG/DL (ref 0.93–1.7)
TOTAL IRON BINDING CAPACITY: 398 MCG/DL (ref 250–450)
TOTAL PROTEIN: 7.7 G/DL (ref 6.4–8.3)
TRIGL SERPL-MCNC: 163 MG/DL (ref 0–149)
TSH SERPL DL<=0.05 MIU/L-ACNC: 71.92 UIU/ML (ref 0.27–4.2)
UROBILINOGEN, URINE: 0.2 E.U./DL
VITAMIN D 25-HYDROXY: 16 NG/ML (ref 30–100)
VLDLC SERPL CALC-MCNC: 33 MG/DL
WBC # BLD: 10.5 E9/L (ref 4.5–11.5)

## 2021-07-22 ENCOUNTER — OFFICE VISIT (OUTPATIENT)
Dept: PRIMARY CARE CLINIC | Age: 31
End: 2021-07-22
Payer: COMMERCIAL

## 2021-07-22 VITALS
HEIGHT: 60 IN | SYSTOLIC BLOOD PRESSURE: 122 MMHG | OXYGEN SATURATION: 95 % | BODY MASS INDEX: 41.03 KG/M2 | HEART RATE: 116 BPM | DIASTOLIC BLOOD PRESSURE: 72 MMHG | WEIGHT: 209 LBS | TEMPERATURE: 97.5 F

## 2021-07-22 DIAGNOSIS — D50.8 OTHER IRON DEFICIENCY ANEMIA: ICD-10-CM

## 2021-07-22 DIAGNOSIS — E55.9 VITAMIN D DEFICIENCY: ICD-10-CM

## 2021-07-22 DIAGNOSIS — F41.9 ANXIETY: ICD-10-CM

## 2021-07-22 DIAGNOSIS — Z00.01 ENCOUNTER FOR WELL ADULT EXAM WITH ABNORMAL FINDINGS: Primary | ICD-10-CM

## 2021-07-22 DIAGNOSIS — E06.3 HASHIMOTO'S THYROIDITIS: ICD-10-CM

## 2021-07-22 PROCEDURE — 99395 PREV VISIT EST AGE 18-39: CPT | Performed by: FAMILY MEDICINE

## 2021-07-22 RX ORDER — LEVOTHYROXINE SODIUM 0.2 MG/1
TABLET ORAL
Qty: 40 TABLET | Refills: 5 | Status: SHIPPED
Start: 2021-07-22 | End: 2022-07-29 | Stop reason: SDUPTHER

## 2021-07-22 RX ORDER — DOXYCYCLINE HYCLATE 50 MG/1
324 CAPSULE, GELATIN COATED ORAL
Qty: 30 TABLET | Refills: 5 | Status: SHIPPED
Start: 2021-07-22 | End: 2022-07-29 | Stop reason: SDUPTHER

## 2021-07-22 RX ORDER — BUPROPION HYDROCHLORIDE 300 MG/1
300 TABLET ORAL EVERY MORNING
Qty: 30 TABLET | Refills: 5 | Status: SHIPPED
Start: 2021-07-22 | End: 2021-10-22

## 2021-07-22 RX ORDER — ERGOCALCIFEROL 1.25 MG/1
50000 CAPSULE ORAL WEEKLY
Qty: 12 CAPSULE | Refills: 1 | Status: SHIPPED
Start: 2021-07-22 | End: 2022-01-26 | Stop reason: SDUPTHER

## 2021-07-22 RX ORDER — SERTRALINE HYDROCHLORIDE 100 MG/1
100 TABLET, FILM COATED ORAL DAILY
COMMUNITY
End: 2021-10-22

## 2021-07-22 ASSESSMENT — ENCOUNTER SYMPTOMS
BACK PAIN: 0
CONSTIPATION: 0
COUGH: 0
VOMITING: 0
WHEEZING: 0
DIARRHEA: 1
ABDOMINAL PAIN: 0
NAUSEA: 0
SHORTNESS OF BREATH: 0

## 2021-07-22 NOTE — PROGRESS NOTES
21  Ivy Alexis : 1990 Sex: female  Age: 31 y.o. Chief Complaint   Patient presents with    Annual Exam     be well     HPI:  31 y.o. female presents today for annual physical.  Patient's chart, medical, surgical and medication history all reviewed. Well Adult Physical  Patient here for a physical exam.  The patient reports problems - moods are worsening. Difficulty at home. Stress at work. IBS worse. Do you take any herbs or supplements that were not prescribed by a doctor? no   Are you taking calcium supplements? no   Are you taking aspirin daily? no    Colonoscopy: No prior colonoscopy  Dental visit: Within last 6 mos  Vision check:  No Problems    Last time routine bloodwork was done: 2021- reviewed in detail with patient    Immunization status: up to date and documented. Smoking status: never    Physical activity:  infrequently      ROS:  Review of Systems   Constitutional: Positive for fatigue. Negative for chills and fever. Respiratory: Negative for cough, shortness of breath and wheezing. Cardiovascular: Negative for chest pain and palpitations. Gastrointestinal: Positive for diarrhea. Negative for abdominal pain, constipation, nausea and vomiting. Musculoskeletal: Negative for arthralgias and back pain. Skin: Negative for rash. Neurological: Negative for dizziness and headaches. Psychiatric/Behavioral: Positive for dysphoric mood. The patient is nervous/anxious. All other systems reviewed and are negative. Current Outpatient Medications on File Prior to Visit   Medication Sig Dispense Refill    sertraline (ZOLOFT) 100 MG tablet Take 100 mg by mouth daily      Norgestim-Eth Estrad Triphasic 0.18/0.215/0.25 MG-35 MCG TABS TAKE ONE TABLET BY MOUTH EVERY DAY FOR 3 MONTHS. No current facility-administered medications on file prior to visit.        Allergies   Allergen Reactions    Avelox [Moxifloxacin] Swelling    Cefzil [Cefprozil] Swelling  Augmentin [Amoxicillin-Pot Clavulanate] Nausea And Vomiting     Also non narcotic pain medication       Past Medical History:   Diagnosis Date    Abnormal Pap smear of cervix     repeat without issue    Anemia     Depression     Gestational diabetes     Took metformin    Hypothyroidism      Past Surgical History:   Procedure Laterality Date     SECTION N/A 2019     SECTION performed by Sandi Canavan, DO at [facility] L[facility]D OR    TYMPANOSTOMY TUBE PLACEMENT      WISDOM TOOTH EXTRACTION       Family History   Problem Relation Age of Onset    Diabetes Father     Hypothyroidism Father    Josesito Nair Rheum Arthritis Mother     Breast Cancer Paternal Aunt      Social History     Socioeconomic History    Marital status: Single     Spouse name: Not on file    Number of children: Not on file    Years of education: Not on file    Highest education level: Not on file   Occupational History    Not on file   Tobacco Use    Smoking status: Never Smoker    Smokeless tobacco: Never Used   Vaping Use    Vaping Use: Never used   Substance and Sexual Activity    Alcohol use: Not Currently    Drug use: Never    Sexual activity: Yes     Partners: Male   Other Topics Concern    Not on file   Social History Narrative    Not on file     Social Determinants of Health     Financial Resource Strain:     Difficulty of Paying Living Expenses:    Food Insecurity:     Worried About Running Out of Food in the Last Year:     [address] in the Last Year:    Transportation Needs:     Lack of Transportation (Medical):      Lack of Transportation (Non-Medical):    Physical Activity:     Days of Exercise per Week:     Minutes of Exercise per Session:    Stress:     Feeling of Stress :    Social Connections:     Frequency of Communication with Friends and Family:     Frequency of Social Gatherings with Friends and Family:     Attends Protestant Services:     Active Member of Clubs or Organizations:  Attends Club or Organization Meetings:     Marital Status:    Intimate Partner Violence:     Fear of Current or Ex-Partner:     Emotionally Abused:     Physically Abused:     Sexually Abused:        Vitals:    07/22/21 0802   BP: 122/72   Pulse: 116   Temp: 97.5 °F (36.4 °C)   SpO2: 95%   Weight: 209 lb (94.8 kg)   Height: 5' (1.524 m)       Physical Exam:  Physical Exam  Vitals and nursing note reviewed. Constitutional:       General: She is not in acute distress. Appearance: Normal appearance. She is well-developed. She is obese. She is not ill-appearing. HENT:      Head: Normocephalic and atraumatic. Right Ear: Hearing and external ear normal.      Left Ear: Hearing and external ear normal.      Nose: Nose normal.      Mouth/Throat:      Mouth: Mucous membranes are moist.   Eyes:      General: Lids are normal. No scleral icterus. Extraocular Movements: Extraocular movements intact. Conjunctiva/sclera: Conjunctivae normal.   Neck:      Thyroid: Thyromegaly present. No thyroid mass or thyroid tenderness. Cardiovascular:      Rate and Rhythm: Normal rate and regular rhythm. Heart sounds: Normal heart sounds. No murmur heard. Pulmonary:      Effort: Pulmonary effort is normal. No respiratory distress. Breath sounds: Normal breath sounds. No wheezing. Musculoskeletal:         General: No tenderness or deformity. Normal range of motion. Cervical back: Normal range of motion and neck supple. Right lower leg: No edema. Left lower leg: No edema. Lymphadenopathy:      Cervical: No cervical adenopathy. Skin:     General: Skin is warm and dry. Findings: No rash. Neurological:      General: No focal deficit present. Mental Status: She is alert and oriented to person, place, and time. Psychiatric:         Mood and Affect: Mood and affect normal.         Speech: Speech normal.         Behavior: Behavior normal.         Thought Content:  Thought content normal.         Labs:  CBC with Differential:    Lab Results   Component Value Date    WBC 10.5 07/21/2021    RBC 4.78 07/21/2021    HGB 11.3 07/21/2021    HCT 37.6 07/21/2021     07/21/2021    MCV 78.7 07/21/2021    MCH 23.6 07/21/2021    MCHC 30.1 07/21/2021    RDW 16.2 07/21/2021    LYMPHOPCT 20.8 07/21/2021    MONOPCT 5.1 07/21/2021    BASOPCT 0.6 07/21/2021    MONOSABS 0.54 07/21/2021    LYMPHSABS 2.18 07/21/2021    EOSABS 0.28 07/21/2021    BASOSABS 0.06 07/21/2021     CMP:    Lab Results   Component Value Date     07/21/2021    K 4.0 07/21/2021     07/21/2021    CO2 23 07/21/2021    BUN 6 07/21/2021    CREATININE 0.8 07/21/2021    GFRAA >60 07/21/2021    LABGLOM >60 07/21/2021    GLUCOSE 83 07/21/2021    PROT 7.7 07/21/2021    LABALBU 4.6 07/21/2021    CALCIUM 9.1 07/21/2021    BILITOT 0.6 07/21/2021    ALKPHOS 81 07/21/2021    AST 21 07/21/2021    ALT 15 07/21/2021     U/A:    Lab Results   Component Value Date    NITRITE negative 01/08/2021    COLORU Yellow 07/21/2021    PROTEINU Negative 07/21/2021    PHUR 8.0 07/21/2021    WBCUA 1-3 12/18/2019    RBCUA NONE 12/18/2019    MUCUS Present 12/18/2019    BACTERIA MODERATE 12/18/2019    CLARITYU Clear 07/21/2021    SPECGRAV 1.020 07/21/2021    LEUKOCYTESUR Negative 07/21/2021    UROBILINOGEN 0.2 07/21/2021    BILIRUBINUR Negative 07/21/2021    BILIRUBINUR negative 01/08/2021    BLOODU Negative 07/21/2021    GLUCOSEU Negative 07/21/2021     HgBA1c:    Lab Results   Component Value Date    LABA1C 5.3 07/21/2021     FLP:    Lab Results   Component Value Date    TRIG 163 07/21/2021    HDL 37 07/21/2021    LDLCALC 119 07/21/2021    LABVLDL 33 07/21/2021     TSH:    Lab Results   Component Value Date    TSH 71.920 07/21/2021     IRON:    Lab Results   Component Value Date    IRON 49 07/21/2021     Iron Saturation:  No components found for: PERCENTFE  TIBC:    Lab Results   Component Value Date    TIBC 398 07/21/2021     FERRITIN:    Lab Results   Component Value Date    FERRITIN 9 07/21/2021        Assessment and Plan:  Robert Carrillo was seen today for annual exam.    Diagnoses and all orders for this visit:    Encounter for well adult exam with abnormal findings  Labs reviewed in detail with patient. UTD on . Discussed dietary/exercise changes for overall health and well being. Hashimoto's thyroiditis  -     levothyroxine (SYNTHROID) 200 MCG tablet; Take one tab PO daily with 2 tabs PO on Sat and Sun  -     T4, Free; Future  -     TSH without Reflex; Future  Patient had stopped medication temporarily. Has since restarted, but will increase to 2 tabs on Sat and Sun    Anxiety  -     buPROPion (WELLBUTRIN XL) 300 MG extended release tablet; Take 1 tablet by mouth every morning  Struggling with difficulties at home and work. Increase to 300 mg. Other iron deficiency anemia  -     ferrous gluconate (FERGON) 324 (38 Fe) MG tablet; Take 1 tablet by mouth daily (with breakfast)  -     Iron and TIBC; Future  -     Ferritin; Future    Vitamin D deficiency  -     vitamin D (ERGOCALCIFEROL) 1.25 MG (57946 UT) CAPS capsule; Take 1 capsule by mouth once a week  -     Vitamin D 25 Hydroxy; Future        Return in about 3 months (around 10/22/2021) for Chronic medical conditions.       Seen By:  Jorge Odell,

## 2021-07-28 LAB — THYROID PEROXIDASE (TPO) ABS: 488 IU/ML (ref 0–25)

## 2021-08-05 ENCOUNTER — OFFICE VISIT (OUTPATIENT)
Dept: FAMILY MEDICINE CLINIC | Age: 31
End: 2021-08-05
Payer: COMMERCIAL

## 2021-08-05 VITALS
HEIGHT: 60 IN | SYSTOLIC BLOOD PRESSURE: 120 MMHG | OXYGEN SATURATION: 98 % | BODY MASS INDEX: 41.03 KG/M2 | HEART RATE: 116 BPM | WEIGHT: 209 LBS | DIASTOLIC BLOOD PRESSURE: 76 MMHG | TEMPERATURE: 97.3 F | RESPIRATION RATE: 18 BRPM

## 2021-08-05 DIAGNOSIS — J02.9 SORE THROAT: Primary | ICD-10-CM

## 2021-08-05 DIAGNOSIS — H92.02 EAR PAIN, LEFT: ICD-10-CM

## 2021-08-05 LAB — S PYO AG THROAT QL: NORMAL

## 2021-08-05 PROCEDURE — 87880 STREP A ASSAY W/OPTIC: CPT | Performed by: STUDENT IN AN ORGANIZED HEALTH CARE EDUCATION/TRAINING PROGRAM

## 2021-08-05 PROCEDURE — 99213 OFFICE O/P EST LOW 20 MIN: CPT | Performed by: STUDENT IN AN ORGANIZED HEALTH CARE EDUCATION/TRAINING PROGRAM

## 2021-08-05 RX ORDER — DOXYCYCLINE HYCLATE 100 MG
100 TABLET ORAL 2 TIMES DAILY
Qty: 20 TABLET | Refills: 0 | Status: SHIPPED | OUTPATIENT
Start: 2021-08-05 | End: 2021-08-15

## 2021-08-05 NOTE — PROGRESS NOTES
21  Jeanne Mayers : 1990 Sex: female  Age 31 y.o. Subjective:  Chief Complaint   Patient presents with    Otalgia     patient stated left ear     Pharyngitis       HPI:   Jeanne Mayers , 31 y.o. female presents to the clinic for evaluation of ear pain and sore throat x 1 days. The patient has not taken anything for symptoms. The patient reports no known ill exposure. The patient denies acute loss of taste or smell, headache, sinus congestion, cough, rash. The patient denies body aches, chills, fever, and fatigue. The patient also denies chest pain, abdominal pain, shortness of breath, wheezing, and nausea / vomiting / diarrhea. ROS:   Unless otherwise stated in this report the patient's positive and negative responses for review of systems for constitutional, eyes, ENT, cardiovascular, respiratory, gastrointestinal, neurological, , musculoskeletal, and integument systems and related systems to the presenting problem are either stated in the history of present illness or were not pertinent or were negative for the symptoms and/or complaints related to the presenting medical problem. Positives and pertinent negatives as per HPI. All others reviewed and are negative.       PMH:     Past Medical History:   Diagnosis Date    Abnormal Pap smear of cervix     repeat without issue    Anemia     Depression     Gestational diabetes     Took metformin    Hypothyroidism        Past Surgical History:   Procedure Laterality Date     SECTION N/A 2019     SECTION performed [facility]D [facility]    TYMPANOSTOMY TUBE PLACEMENT      WISDOM TOOTH EXTRACTION         Family History   Problem Relation Age of Onset    Diabetes Father     Hypothyroidism Father     Rheum Arthritis Mother     Breast Cancer Paternal Aunt        Medications:     Current Outpatient Medications:     doxycycline hyclate (VIBRA-TABS) 100 MG tablet, Take 1 tablet by mouth 2 times daily for 10 days, Disp: 20 tablet, Rfl: 0    sertraline (ZOLOFT) 100 MG tablet, Take 100 mg by mouth daily, Disp: , Rfl:     levothyroxine (SYNTHROID) 200 MCG tablet, Take one tab PO daily with 2 tabs PO on Sat and Sun, Disp: 40 tablet, Rfl: 5    buPROPion (WELLBUTRIN XL) 300 MG extended release tablet, Take 1 tablet by mouth every morning, Disp: 30 tablet, Rfl: 5    vitamin D (ERGOCALCIFEROL) 1.25 MG (69657 UT) CAPS capsule, Take 1 capsule by mouth once a week, Disp: 12 capsule, Rfl: 1    ferrous gluconate (FERGON) 324 (38 Fe) MG tablet, Take 1 tablet by mouth daily (with breakfast), Disp: 30 tablet, Rfl: 5    Norgestim-Eth Estrad Triphasic 0.18/0.215/0.25 MG-35 MCG TABS, TAKE ONE TABLET BY MOUTH EVERY DAY FOR 3 MONTHS., Disp: , Rfl:     Allergies: Allergies   Allergen Reactions    Avelox [Moxifloxacin] Swelling    Cefzil [Cefprozil] Swelling    Augmentin [Amoxicillin-Pot Clavulanate] Nausea And Vomiting     Also non narcotic pain medication       Social History:     Social History     Tobacco Use    Smoking status: Never Smoker    Smokeless tobacco: Never Used   Vaping Use    Vaping Use: Never used   Substance Use Topics    Alcohol use: Not Currently    Drug use: Never         Physical Exam:     Vitals:    08/05/21 0941   BP: 120/76   Pulse: 116   Resp: 18   Temp: 97.3 °F (36.3 °C)   SpO2: 98%   Weight: 209 lb (94.8 kg)   Height: 5' (1.524 m)       Physical Exam (PE)    Physical Exam  Vitals and nursing note reviewed. Constitutional:       Appearance: Normal appearance. HENT:      Head: Normocephalic and atraumatic. Right Ear: Ear canal and external ear normal. There is impacted cerumen. Left Ear: Ear canal and external ear normal.      Ears:      Comments: Left TM scarred     Nose: Nose normal.      Mouth/Throat:      Mouth: Mucous membranes are moist.      Pharynx: Oropharynx is clear. Eyes:      Extraocular Movements: Extraocular movements intact.       Conjunctiva/sclera: Conjunctivae normal.      Pupils: Pupils are equal, round, and reactive to light. Cardiovascular:      Rate and Rhythm: Normal rate and regular rhythm. Pulses: Normal pulses. Heart sounds: Normal heart sounds. Pulmonary:      Effort: Pulmonary effort is normal.      Breath sounds: Normal breath sounds. Abdominal:      General: Bowel sounds are normal.      Palpations: Abdomen is soft. Musculoskeletal:         General: Normal range of motion. Cervical back: Normal range of motion and neck supple. Skin:     General: Skin is warm and dry. Capillary Refill: Capillary refill takes less than 2 seconds. Neurological:      General: No focal deficit present. Mental Status: She is alert and oriented to person, place, and time. Psychiatric:         Mood and Affect: Mood normal.         Behavior: Behavior normal.         Thought Content: Thought content normal.          Testing:   (All laboratory and radiology results have been personally reviewed by myself)  Labs:  Results for orders placed or performed in visit on 08/05/21   POCT rapid strep A   Result Value Ref Range    Strep A Ag None Detected None Detected       Imaging: All Radiology results interpreted by Radiologist unless otherwise noted. No orders to display       Assessment / Plan:   The patient's vitals, allergies, medications, and past medical history have been reviewed. Murtaza Guzman was seen today for otalgia and pharyngitis. Diagnoses and all orders for this visit:    Sore throat  -     POCT rapid strep A    Ear pain, left  -     doxycycline hyclate (VIBRA-TABS) 100 MG tablet; Take 1 tablet by mouth 2 times daily for 10 days        - Patient is directed to take the prescribed medication as ordered. Discussed taking vitamin D, vitamin C, and zinc supplementation.     - Increase fluids and rest. Symptomatic relief discussed including Tylenol prn pain/fever. Schedule virtual f/u with PCP in 2-3 days.   Red flag symptoms were discussed with the patient today. The patient is directed to go the ED if symptoms worsen or change. Pt verbalizes understanding and is in agreement with plan of care. All questions answered.     SIGNATURE: Tracy Ureña DO

## 2021-08-27 RX ORDER — DESVENLAFAXINE 25 MG/1
25 TABLET, EXTENDED RELEASE ORAL DAILY
Qty: 30 TABLET | Refills: 2 | Status: SHIPPED
Start: 2021-08-27 | End: 2021-10-22

## 2021-09-22 ENCOUNTER — TELEPHONE (OUTPATIENT)
Dept: PRIMARY CARE CLINIC | Age: 31
End: 2021-09-22

## 2021-09-22 NOTE — TELEPHONE ENCOUNTER
Pt calling stating her waist circumference is not transferring over for her be well within screening. Pt emailed be well form. Advised form will be faxed.

## 2021-10-22 ENCOUNTER — OFFICE VISIT (OUTPATIENT)
Dept: FAMILY MEDICINE CLINIC | Age: 31
End: 2021-10-22
Payer: COMMERCIAL

## 2021-10-22 VITALS
HEART RATE: 91 BPM | DIASTOLIC BLOOD PRESSURE: 72 MMHG | TEMPERATURE: 97 F | WEIGHT: 209 LBS | HEIGHT: 60 IN | BODY MASS INDEX: 41.03 KG/M2 | SYSTOLIC BLOOD PRESSURE: 114 MMHG | OXYGEN SATURATION: 99 %

## 2021-10-22 DIAGNOSIS — E06.3 HASHIMOTO'S THYROIDITIS: ICD-10-CM

## 2021-10-22 DIAGNOSIS — F41.9 ANXIETY: Primary | ICD-10-CM

## 2021-10-22 PROCEDURE — 99214 OFFICE O/P EST MOD 30 MIN: CPT | Performed by: FAMILY MEDICINE

## 2021-10-22 RX ORDER — DESVENLAFAXINE 25 MG/1
25 TABLET, EXTENDED RELEASE ORAL DAILY
COMMUNITY
End: 2021-10-22 | Stop reason: DRUGHIGH

## 2021-10-22 RX ORDER — DESVENLAFAXINE 50 MG/1
50 TABLET, EXTENDED RELEASE ORAL DAILY
Qty: 90 TABLET | Refills: 0 | Status: SHIPPED
Start: 2021-10-22 | End: 2022-01-26 | Stop reason: SDUPTHER

## 2021-10-22 ASSESSMENT — ENCOUNTER SYMPTOMS
NAUSEA: 0
SHORTNESS OF BREATH: 0
COUGH: 0
VOMITING: 0
ABDOMINAL PAIN: 0
CONSTIPATION: 0
WHEEZING: 0
BACK PAIN: 0
DIARRHEA: 0

## 2021-10-22 NOTE — PROGRESS NOTES
10/22/21  Myrtle Martinez : 1990 Sex: female  Age: 31 y.o. Chief Complaint   Patient presents with    Medication Refill     HPI:  31 y.o. female presents today for 3 month follow up of chronic medical conditions, medication refills and FBW. Patient's chart, medical, surgical and medication history all reviewed. Hypothyroidism  Patient presents for routine follow up of Hypothyroidism. Current symptoms: change in energy level and nervousness. Patient denies diarrhea, heat / cold intolerance, palpitations and weight changes. Symptoms have progressed to a point and plateaued. No difficulty swallowing or masses felt. Last TSH was 71.920 (2021)---29.640 (2020)---126.70 (2020)--35.600 (2020)--10.3 (2020). TPO was 768.8. Patient is currently taking Levothyroxine 200 mcg with two tablets on Sat and . Anxiety  Patient complains of evaluation of anxiety disorder and panic attacks. She has the following anxiety symptoms: difficulty concentrating, feelings of losing control, racing thoughts. Onset of symptoms was approximately several months ago, gradually worsening since that time. She denies current suicidal and homicidal ideation. Possible organic causes contributing are: endocrine/metabolic. Risk factors: previous episode of depression. Previous treatment includes Wellbutrin, Zoloft and Pristiq and no therapy. She complains of the following side effects from the treatment: none. Patient had been having more issues with irritability and tearfulness. Started and Pristiq and weaned off Wellbutrin. Doing OK but still tearful. ROS:  Review of Systems   Constitutional: Positive for fatigue. Negative for chills and fever. Respiratory: Negative for cough, shortness of breath and wheezing. Cardiovascular: Negative for chest pain and palpitations. Gastrointestinal: Negative for abdominal pain, constipation, diarrhea, nausea and vomiting. Musculoskeletal: Negative for arthralgias and back pain. Skin: Negative for rash. Neurological: Negative for dizziness and headaches. Psychiatric/Behavioral: Negative for dysphoric mood. The patient is nervous/anxious. All other systems reviewed and are negative. Current Outpatient Medications on File Prior to Visit   Medication Sig Dispense Refill    levothyroxine (SYNTHROID) 200 MCG tablet Take one tab PO daily with 2 tabs PO on Sat and Sun 40 tablet 5    vitamin D (ERGOCALCIFEROL) 1.25 MG (04510 UT) CAPS capsule Take 1 capsule by mouth once a week 12 capsule 1    ferrous gluconate (FERGON) 324 (38 Fe) MG tablet Take 1 tablet by mouth daily (with breakfast) 30 tablet 5    Norgestim-Eth Estrad Triphasic 0.18/0.215/0.25 MG-35 MCG TABS TAKE ONE TABLET BY MOUTH EVERY DAY FOR 3 MONTHS. No current facility-administered medications on file prior to visit.        Allergies   Allergen Reactions    Avelox [Moxifloxacin] Swelling    Cefzil [Cefprozil] Swelling    Augmentin [Amoxicillin-Pot Clavulanate] Nausea And Vomiting     Also non narcotic pain medication       Past Medical History:   Diagnosis Date    Abnormal Pap smear of cervix     repeat without issue    Anemia     Depression     Gestational diabetes     Took metformin    Hypothyroidism      Past Surgical History:   Procedure Laterality Date     SECTION N/A 2019     SECTION performed [facility]D [facility]    TYMPANOSTOMY TUBE PLACEMENT      WISDOM TOOTH EXTRACTION       Family History   Problem Relation Age of Onset    Diabetes Father     Hypothyroidism Father     Rheum Arthritis Mother     Breast Cancer Paternal Aunt         Social History     Socioeconomic History    Marital status:      Spouse name: Not on file    Number of children: Not on file    Years of education: Not on file    Highest education level: Not on file   Occupational History    Not on file   Tobacco Use  Smoking status: Never Smoker    Smokeless tobacco: Never Used   Vaping Use    Vaping Use: Never used   Substance and Sexual Activity    Alcohol use: Not Currently    Drug use: Never    Sexual activity: Yes     Partners: Male   Other Topics Concern    Not on file   Social History Narrative    Not on file     Social Determinants of Health     Financial Resource Strain:     Difficulty of Paying Living Expenses:    Food Insecurity:     Worried About Running Out of Food in the Last Year:     [address] in the Last Year:    Transportation Needs:     Lack of Transportation (Medical):  Lack of Transportation (Non-Medical):    Physical Activity:     Days of Exercise per Week:     Minutes of Exercise per Session:    Stress:     Feeling of Stress :    Social Connections:     Frequency of Communication with Friends and Family:     Frequency of Social Gatherings with Friends and Family:     Attends Holiness Services:     Active Member of Clubs or Organizations:     Attends Club or Organization Meetings:     Marital Status:    Intimate Partner Violence:     Fear of Current or Ex-Partner:     Emotionally Abused:     Physically Abused:     Sexually Abused:        Vitals:    10/22/21 0842   BP: 114/72   Pulse: 91   Temp: 97 °F (36.1 °C)   SpO2: 99%   Weight: 209 lb (94.8 kg)   Height: 5' (1.524 m)       Physical Exam:  Physical Exam  Vitals and nursing note reviewed. Constitutional:       General: She is not in acute distress. Appearance: Normal appearance. She is well-developed. She is obese. She is not ill-appearing. HENT:      Head: Normocephalic and atraumatic. Right Ear: Hearing and external ear normal.      Left Ear: Hearing and external ear normal.      Nose: Nose normal.      Mouth/Throat:      Mouth: Mucous membranes are moist.   Eyes:      General: Lids are normal. No scleral icterus. Extraocular Movements: Extraocular movements intact.       Conjunctiva/sclera: Conjunctivae normal.   Neck:      Thyroid: Thyromegaly present. No thyroid mass or thyroid tenderness. Cardiovascular:      Rate and Rhythm: Normal rate and regular rhythm. Heart sounds: Normal heart sounds. No murmur heard. Pulmonary:      Effort: Pulmonary effort is normal. No respiratory distress. Breath sounds: Normal breath sounds. No wheezing. Musculoskeletal:         General: No tenderness or deformity. Normal range of motion. Cervical back: Normal range of motion and neck supple. Right lower leg: No edema. Left lower leg: No edema. Lymphadenopathy:      Cervical: No cervical adenopathy. Skin:     General: Skin is warm and dry. Findings: No rash. Neurological:      General: No focal deficit present. Mental Status: She is alert and oriented to person, place, and time. Psychiatric:         Mood and Affect: Mood and affect normal.         Speech: Speech normal.         Behavior: Behavior normal.         Thought Content:  Thought content normal.         Labs:  CBC with Differential:    Lab Results   Component Value Date    WBC 10.5 07/21/2021    RBC 4.78 07/21/2021    HGB 11.3 07/21/2021    HCT 37.6 07/21/2021     07/21/2021    MCV 78.7 07/21/2021    MCH 23.6 07/21/2021    MCHC 30.1 07/21/2021    RDW 16.2 07/21/2021    LYMPHOPCT 20.8 07/21/2021    MONOPCT 5.1 07/21/2021    BASOPCT 0.6 07/21/2021    MONOSABS 0.54 07/21/2021    LYMPHSABS 2.18 07/21/2021    EOSABS 0.28 07/21/2021    BASOSABS 0.06 07/21/2021     CMP:    Lab Results   Component Value Date     07/21/2021    K 4.0 07/21/2021     07/21/2021    CO2 23 07/21/2021    BUN 6 07/21/2021    CREATININE 0.8 07/21/2021    GFRAA >60 07/21/2021    LABGLOM >60 07/21/2021    GLUCOSE 83 07/21/2021    PROT 7.7 07/21/2021    LABALBU 4.6 07/21/2021    CALCIUM 9.1 07/21/2021    BILITOT 0.6 07/21/2021    ALKPHOS 81 07/21/2021    AST 21 07/21/2021    ALT 15 07/21/2021     HgBA1c:    Lab Results   Component Value Date    LABA1C 5.3 07/21/2021     FLP:    Lab Results   Component Value Date    TRIG 163 07/21/2021    HDL 37 07/21/2021    LDLCALC 119 07/21/2021    LABVLDL 33 07/21/2021     TSH:    Lab Results   Component Value Date    TSH 71.920 07/21/2021          Assessment and Plan:  Dimitris Perales was seen today for medication refill. Diagnoses and all orders for this visit:    Anxiety  -     desvenlafaxine succinate (PRISTIQ) 50 MG TB24 extended release tablet; Take 1 tablet by mouth daily  With continued issues, will increase Pristiq to 50 mg. Patient to discuss effects in 1 month. Hashimoto's thyroiditis  Due for labs. Already ordered. Return in about 3 months (around 1/22/2022), or if symptoms worsen or fail to improve, for Chronic medical conditions.       Seen By:  Jade Cote DO

## 2021-11-16 ENCOUNTER — OFFICE VISIT (OUTPATIENT)
Dept: FAMILY MEDICINE CLINIC | Age: 31
End: 2021-11-16
Payer: COMMERCIAL

## 2021-11-16 VITALS
DIASTOLIC BLOOD PRESSURE: 76 MMHG | TEMPERATURE: 98.4 F | SYSTOLIC BLOOD PRESSURE: 112 MMHG | HEART RATE: 110 BPM | OXYGEN SATURATION: 98 %

## 2021-11-16 DIAGNOSIS — J02.9 SORE THROAT: Primary | ICD-10-CM

## 2021-11-16 DIAGNOSIS — J06.9 VIRAL UPPER RESPIRATORY ILLNESS: ICD-10-CM

## 2021-11-16 LAB
Lab: NORMAL
PERFORMING INSTRUMENT: NORMAL
QC PASS/FAIL: NORMAL
SARS-COV-2, POC: NORMAL

## 2021-11-16 PROCEDURE — 87426 SARSCOV CORONAVIRUS AG IA: CPT | Performed by: FAMILY MEDICINE

## 2021-11-16 PROCEDURE — 99213 OFFICE O/P EST LOW 20 MIN: CPT | Performed by: FAMILY MEDICINE

## 2021-11-16 RX ORDER — AZITHROMYCIN 250 MG/1
250 TABLET, FILM COATED ORAL SEE ADMIN INSTRUCTIONS
Qty: 6 TABLET | Refills: 0 | Status: SHIPPED | OUTPATIENT
Start: 2021-11-16 | End: 2021-11-21

## 2021-11-16 ASSESSMENT — ENCOUNTER SYMPTOMS
SINUS PAIN: 0
COUGH: 1
EYE PAIN: 0
VOMITING: 0
SORE THROAT: 1
SHORTNESS OF BREATH: 0
PHOTOPHOBIA: 0
ALLERGIC/IMMUNOLOGIC NEGATIVE: 1
BACK PAIN: 0
EYE REDNESS: 0
NAUSEA: 0
SINUS PRESSURE: 1
ABDOMINAL PAIN: 0
EYE DISCHARGE: 0
TROUBLE SWALLOWING: 0
DIARRHEA: 0
BLOOD IN STOOL: 0
CHEST TIGHTNESS: 0

## 2021-11-16 NOTE — PROGRESS NOTES
21  Mendoza Degroot : 1990 Sex: female  Age: 31 y.o. Assessment and Plan:  Mariposa Matt was seen today for pharyngitis and cough. Diagnoses and all orders for this visit:    Sore throat  -     POCT COVID-19, Antigen  -     COVID-19 Ambulatory; Future  -     azithromycin (ZITHROMAX) 250 MG tablet; Take 1 tablet by mouth See Admin Instructions for 5 days 500mg on day 1 followed by 250mg on days 2 - 5    Viral upper respiratory illness  -     COVID-19 Ambulatory; Future    Tylenol, fluids, rest, cool mist, call, recheck here or to ER immediately if condition worsens  Isolate for the next 48 hours, PCR positive, then total of 10 days. If complaints persist in any way, recheck here in the office, or to the emergency room immediately. Return 3 to 5 days if not improved. Chief Complaint   Patient presents with    Pharyngitis     started     Cough     started  - no fever that she is aware of. Congestion, pressure, drainage, facial tenderness, mild headache, sore throat, cough, onset 3 days ago. He had Covid last December. He was vaccinated and in April and May. Denies fever, chills, diaphoresis, nausea, vomiting, decreased oral intake. Denies other GI or  complaints. OTC treatments minimally effective. Review of Systems   Constitutional: Negative for appetite change, fatigue and unexpected weight change. HENT: Positive for congestion, sinus pressure and sore throat. Negative for ear pain, hearing loss, sinus pain and trouble swallowing. Eyes: Negative for photophobia, pain, discharge and redness. Respiratory: Positive for cough. Negative for chest tightness and shortness of breath. Cardiovascular: Negative for chest pain, palpitations and leg swelling. Gastrointestinal: Negative for abdominal pain, blood in stool, diarrhea, nausea and vomiting. Endocrine: Negative. Genitourinary: Negative for dysuria, flank pain, frequency and hematuria. Musculoskeletal: Negative for arthralgias, back pain, joint swelling and myalgias. Skin: Negative. Allergic/Immunologic: Negative. Neurological: Negative for dizziness, seizures, syncope, weakness, light-headedness, numbness and headaches. Hematological: Negative for adenopathy. Does not bruise/bleed easily. Psychiatric/Behavioral: Negative.           Current Outpatient Medications:     azithromycin (ZITHROMAX) 250 MG tablet, Take 1 tablet by mouth See Admin Instructions for 5 days 500mg on day 1 followed by 250mg on days 2 - 5, Disp: 6 tablet, Rfl: 0    desvenlafaxine succinate (PRISTIQ) 50 MG TB24 extended release tablet, Take 1 tablet by mouth daily, Disp: 90 tablet, Rfl: 0    levothyroxine (SYNTHROID) 200 MCG tablet, Take one tab PO daily with 2 tabs PO on Sat and Sun, Disp: 40 tablet, Rfl: 5    vitamin D (ERGOCALCIFEROL) 1.25 MG (35718 UT) CAPS capsule, Take 1 capsule by mouth once a week, Disp: 12 capsule, Rfl: 1    ferrous gluconate (FERGON) 324 (38 Fe) MG tablet, Take 1 tablet by mouth daily (with breakfast), Disp: 30 tablet, Rfl: 5    Norgestim-Eth Estrad Triphasic 0.18/0.215/0.25 MG-35 MCG TABS, TAKE ONE TABLET BY MOUTH EVERY DAY FOR 3 MONTHS., Disp: , Rfl:   Allergies   Allergen Reactions    Avelox [Moxifloxacin] Swelling    Cefzil [Cefprozil] Swelling    Augmentin [Amoxicillin-Pot Clavulanate] Nausea And Vomiting     Also non narcotic pain medication       Past Medical History:   Diagnosis Date    Abnormal Pap smear of cervix     repeat without issue    Anemia     Depression     Gestational diabetes     Took metformin    Hypothyroidism      Past Surgical History:   Procedure Laterality Date     SECTION N/A 2019     SECTION performed by Jose Solomon DO at [facility]    TYMPANOSTOMY TUBE PLACEMENT      WISDOM TOOTH EXTRACTION       Family History   Problem Relation Age of Onset    Diabetes Father     Hypothyroidism Father     Rheum Arthritis Mother     Breast Cancer Paternal Aunt      Social History     Socioeconomic History    Marital status:      Spouse name: Not on file    Number of children: Not on file    Years of education: Not on file    Highest education level: Not on file   Occupational History    Not on file   Tobacco Use    Smoking status: Never Smoker    Smokeless tobacco: Never Used   Vaping Use    Vaping Use: Never used   Substance and Sexual Activity    Alcohol use: Not Currently    Drug use: Never    Sexual activity: Yes     Partners: Male   Other Topics Concern    Not on file   Social History Narrative    Not on file     Social Determinants of Health     Financial Resource Strain:     Difficulty of Paying Living Expenses: Not on file   Food Insecurity:     Worried About Running Out of Food in the Last Year: Not on file    Dashawn of Food in the Last Year: Not on file   Transportation Needs:     Lack of Transportation (Medical): Not on file    Lack of Transportation (Non-Medical):  Not on file   Physical Activity:     Days of Exercise per Week: Not on file    Minutes of Exercise per Session: Not on file   Stress:     Feeling of Stress : Not on file   Social Connections:     Frequency of Communication with Friends and Family: Not on file    Frequency of Social Gatherings with Friends and Family: Not on file    Attends [employer] Services: Not on file    Active Member of [address] [place] [address] or Organizations: Not on file    Attends Club or Organization Meetings: Not on file    Marital Status: Not on file   Intimate Partner Violence:     Fear of Current or Ex-Partner: Not on file    Emotionally Abused: Not on file    Physically Abused: Not on file    Sexually Abused: Not on file   Housing Stability:     Unable to Pay for Housing in the Last Year: Not on file    Number of [place] in the Last Year: Not on file    Unstable Housing in the Last Year: Not on file       Vitals:    11/16/21 1521   BP: 112/76   Pulse: 110   Temp: 98.4 °F (36.9 °C)   SpO2: 98%       Physical Exam  Vitals and nursing note reviewed. Constitutional:       Appearance: She is well-developed. HENT:      Head: Atraumatic. Right Ear: External ear normal.      Left Ear: External ear normal.      Nose: Congestion present. Mouth/Throat:      Pharynx: Posterior oropharyngeal erythema present. No oropharyngeal exudate. Eyes:      Conjunctiva/sclera: Conjunctivae normal.      Pupils: Pupils are equal, round, and reactive to light. Neck:      Thyroid: No thyromegaly. Trachea: No tracheal deviation. Cardiovascular:      Rate and Rhythm: Normal rate and regular rhythm. Heart sounds: No murmur heard. No friction rub. No gallop. Pulmonary:      Effort: Pulmonary effort is normal. No respiratory distress. Breath sounds: Normal breath sounds. Stridor: .covidinstructions. Abdominal:      General: Bowel sounds are normal.      Palpations: Abdomen is soft. Musculoskeletal:         General: No tenderness or deformity. Normal range of motion. Cervical back: Normal range of motion and neck supple. Lymphadenopathy:      Cervical: Cervical adenopathy present. Skin:     General: Skin is warm and dry. Capillary Refill: Capillary refill takes less than 2 seconds. Findings: No rash. Neurological:      Mental Status: She is alert and oriented to person, place, and time. Sensory: No sensory deficit. Motor: No abnormal muscle tone.       Coordination: Coordination normal.      Deep Tendon Reflexes: Reflexes normal.             Seen By:  Checo Moore DO

## 2021-11-17 DIAGNOSIS — J02.9 SORE THROAT: ICD-10-CM

## 2021-11-17 DIAGNOSIS — J06.9 VIRAL UPPER RESPIRATORY ILLNESS: ICD-10-CM

## 2021-11-18 LAB
SARS-COV-2: NOT DETECTED
SOURCE: NORMAL

## 2022-01-02 ENCOUNTER — HOSPITAL ENCOUNTER (OUTPATIENT)
Dept: GENERAL RADIOLOGY | Age: 32
Discharge: HOME | End: 2022-01-04
Payer: COMMERCIAL

## 2022-01-02 ENCOUNTER — HOSPITAL ENCOUNTER (OUTPATIENT)
Age: 32
Discharge: HOME | End: 2022-01-04
Payer: COMMERCIAL

## 2022-01-02 ENCOUNTER — OFFICE VISIT (OUTPATIENT)
Dept: FAMILY MEDICINE CLINIC | Age: 32
End: 2022-01-02
Payer: COMMERCIAL

## 2022-01-02 VITALS
OXYGEN SATURATION: 98 % | BODY MASS INDEX: 41.03 KG/M2 | HEART RATE: 112 BPM | RESPIRATION RATE: 18 BRPM | DIASTOLIC BLOOD PRESSURE: 86 MMHG | TEMPERATURE: 97.9 F | HEIGHT: 60 IN | SYSTOLIC BLOOD PRESSURE: 124 MMHG | WEIGHT: 209 LBS

## 2022-01-02 DIAGNOSIS — R07.9 CHEST PAIN, UNSPECIFIED TYPE: ICD-10-CM

## 2022-01-02 DIAGNOSIS — R07.9 CHEST PAIN, UNSPECIFIED TYPE: Primary | ICD-10-CM

## 2022-01-02 PROCEDURE — 71046 X-RAY EXAM CHEST 2 VIEWS: CPT

## 2022-01-02 PROCEDURE — 99214 OFFICE O/P EST MOD 30 MIN: CPT | Performed by: PHYSICIAN ASSISTANT

## 2022-01-02 PROCEDURE — 93000 ELECTROCARDIOGRAM COMPLETE: CPT | Performed by: PHYSICIAN ASSISTANT

## 2022-01-02 RX ORDER — NAPROXEN 500 MG/1
500 TABLET ORAL 2 TIMES DAILY WITH MEALS
Qty: 20 TABLET | Refills: 0 | Status: SHIPPED
Start: 2022-01-02 | End: 2022-01-26 | Stop reason: CLARIF

## 2022-01-02 RX ORDER — CYCLOBENZAPRINE HCL 10 MG
10 TABLET ORAL 3 TIMES DAILY PRN
Qty: 15 TABLET | Refills: 0 | Status: SHIPPED | OUTPATIENT
Start: 2022-01-02 | End: 2022-01-12

## 2022-01-02 NOTE — PROGRESS NOTES
Chief Complaint       Chest Pain (x 3 days)      History of Present Illness   Source of history provided by: patient. Hannah Vega is a 27 y.o. old female presenting to the walk in clinic for evaluation of chest pain x 3 days. States the pain is located substernally and radiates to her left chest wall. States the pain does not radiate to the right shoulder, jaw, through the back, or to the opposite side of the chest.  Pain is not worse with exertion. Patient states the pain is reproducible to palpation. She denies any known injury. Pt has has had pain like this before. She has not tried anything over-the-counter for symptomatic relief. The CP does not cause SOB. Denies any N/V, diaphoresis, HA, fever, cough, or recent illness. No dizziness, syncope, palpitations, or edema. Denies any hx of asthma or tobacco use. Pt denies any recent sick exposures. ROS    Unless otherwise stated in this report or unable to obtain because of the patient's clinical or mental status as evidenced by the medical record, this patients's positive and negative responses for Review of Systems, constitutional, psych, eyes, ENT, cardiovascular, respiratory, gastrointestinal, neurological, genitourinary, musculoskeletal, integument systems and systems related to the presenting problem are either stated in the preceding or were not pertinent or were negative for the symptoms and/or complaints related to the medical problem. Past Medical History:  has a past medical history of Abnormal Pap smear of cervix, Anemia, Depression, Gestational diabetes, and Hypothyroidism. Past Surgical History:  has a past surgical history that includes Seibert tooth extraction (); Tympanostomy tube placement; and  section (N/A, 2019). Social History:  reports that she has never smoked. She has never used smokeless tobacco. She reports previous alcohol use. She reports that she does not use drugs.   Family History: family history includes Breast Cancer in her paternal aunt; Diabetes in her father; Hypothyroidism in her father; Rheum Arthritis in her mother. Allergies: Avelox [moxifloxacin], Cefzil [cefprozil], and Augmentin [amoxicillin-pot clavulanate]    Physical Exam         VS:  /86   Pulse 112   Temp 97.9 °F (36.6 °C) (Temporal)   Resp 18   Ht 5' (1.524 m)   Wt 209 lb (94.8 kg)   SpO2 98%   BMI 40.82 kg/m²    Oxygen Saturation Interpretation: Normal.    General Appearance/Constitutional:  Alert, development consistent with age, NAD. HEENT:  NC/NT. Airway patent. Neck:  Normal ROM. Supple. Lungs:  CTAB without wheezing, rales, or rhonchi. Heart:  Regular rate and rhythm, normal heart sounds, without pathological murmurs, ectopy, gallops, or rubs. Chest:  Symmetrical without visible rash. There is reproducible pain to palpation of the chest wall over the parasternal musculature and left intercostal spaces (ribs 3-6). Back:  No costovertebral tenderness. Skin:  Normal turgor. Warm, dry, without visible rash, unless noted elsewhere. Neurological:  Oriented. Motor functions intact. Psychiatric:  Pleasant and appropriate. Mood and affect are normal.    Lab / Imaging Results   (All laboratory and radiology results have been personally reviewed by myself)  Labs:  No results found for this visit on 01/02/22. Imaging: All Radiology results interpreted by Radiologist unless otherwise noted. Assessment / Plan     Impression(s):  Carlene Escalona was seen today for chest pain. Diagnoses and all orders for this visit:    Chest pain, unspecified type  -     Cancel: EKG 12 lead; Future  -     EKG 12 lead  -     XR CHEST STANDARD (2 VW); Future  -     naproxen (NAPROSYN) 500 MG tablet; Take 1 tablet by mouth 2 times daily (with meals)  -     cyclobenzaprine (FLEXERIL) 10 MG tablet; Take 1 tablet by mouth 3 times daily as needed for Muscle spasms        Disposition:  Disposition: Discharge to home.     Vitals reviewed

## 2022-01-26 ENCOUNTER — OFFICE VISIT (OUTPATIENT)
Dept: PRIMARY CARE CLINIC | Age: 32
End: 2022-01-26
Payer: COMMERCIAL

## 2022-01-26 VITALS
SYSTOLIC BLOOD PRESSURE: 120 MMHG | WEIGHT: 202 LBS | OXYGEN SATURATION: 96 % | BODY MASS INDEX: 39.45 KG/M2 | HEART RATE: 98 BPM | TEMPERATURE: 98.4 F | DIASTOLIC BLOOD PRESSURE: 82 MMHG

## 2022-01-26 DIAGNOSIS — E06.3 HASHIMOTO'S THYROIDITIS: ICD-10-CM

## 2022-01-26 DIAGNOSIS — F41.9 ANXIETY: Primary | ICD-10-CM

## 2022-01-26 DIAGNOSIS — E55.9 VITAMIN D DEFICIENCY: ICD-10-CM

## 2022-01-26 PROCEDURE — 99214 OFFICE O/P EST MOD 30 MIN: CPT | Performed by: FAMILY MEDICINE

## 2022-01-26 RX ORDER — DESVENLAFAXINE 50 MG/1
50 TABLET, EXTENDED RELEASE ORAL DAILY
Qty: 30 TABLET | Refills: 5 | Status: SHIPPED
Start: 2022-01-26 | End: 2022-05-13 | Stop reason: DRUGHIGH

## 2022-01-26 RX ORDER — ERGOCALCIFEROL 1.25 MG/1
50000 CAPSULE ORAL WEEKLY
Qty: 12 CAPSULE | Refills: 3 | Status: SHIPPED
Start: 2022-01-26 | End: 2022-07-29 | Stop reason: SDUPTHER

## 2022-01-26 ASSESSMENT — ENCOUNTER SYMPTOMS
NAUSEA: 0
COUGH: 0
CONSTIPATION: 0
WHEEZING: 0
DIARRHEA: 0
SHORTNESS OF BREATH: 0
BACK PAIN: 0
ABDOMINAL PAIN: 0
VOMITING: 0

## 2022-01-26 NOTE — PROGRESS NOTES
22  Moises Quarles : 1990 Sex: female  Age: 27 y.o. Chief Complaint   Patient presents with    Medication Refill     HPI:  27 y.o. female presents today for 3 month follow up of chronic medical conditions, medication refills and FBW. Patient's chart, medical, surgical and medication history all reviewed. Hypothyroidism  Patient presents for routine follow up of Hypothyroidism. Current symptoms: change in energy level and nervousness. Patient denies diarrhea, heat / cold intolerance, palpitations and weight changes. Symptoms have progressed to a point and plateaued. No difficulty swallowing or masses felt. Last TSH was 71.920 (2021)---29.640 (2020)---126.70 (2020)--35.600 (2020)--10.3 (2020). TPO was 768.8. Patient is currently taking Levothyroxine 200 mcg with two tablets on Sat and . Anxiety  Patient complains of evaluation of anxiety disorder and panic attacks. She has the following anxiety symptoms: difficulty concentrating, feelings of losing control, racing thoughts. Onset of symptoms was approximately several months ago, gradually worsening since that time. She denies current suicidal and homicidal ideation. Possible organic causes contributing are: endocrine/metabolic. Risk factors: previous episode of depression. Previous treatment includes Wellbutrin, Zoloft and Pristiq and no therapy. She complains of the following side effects from the treatment: none. Patient has been feeling much better on 50 mg dose of Pristiq. She is trying to get pregnant, so she knows she will have to transition off Pristiq at that time. ROS:  Review of Systems   Constitutional: Negative for chills, fatigue and fever. Respiratory: Negative for cough, shortness of breath and wheezing. Cardiovascular: Negative for chest pain and palpitations. Gastrointestinal: Negative for abdominal pain, constipation, diarrhea, nausea and vomiting. Musculoskeletal: Negative for arthralgias and back pain. Skin: Negative for rash. Neurological: Negative for dizziness and headaches. Psychiatric/Behavioral: Negative for dysphoric mood. The patient is nervous/anxious. All other systems reviewed and are negative. Current Outpatient Medications on File Prior to Visit   Medication Sig Dispense Refill    nystatin-triamcinolone (MYCOLOG II) 042755-6.1 UNIT/GM-% cream Apply topically 2 times daily. 60 g 1    levothyroxine (SYNTHROID) 200 MCG tablet Take one tab PO daily with 2 tabs PO on Sat and Sun 40 tablet 5    ferrous gluconate (FERGON) 324 (38 Fe) MG tablet Take 1 tablet by mouth daily (with breakfast) 30 tablet 5     No current facility-administered medications on file prior to visit.        Allergies   Allergen Reactions    Avelox [Moxifloxacin] Swelling    Cefzil [Cefprozil] Swelling    Augmentin [Amoxicillin-Pot Clavulanate] Nausea And Vomiting     Also non narcotic pain medication       Past Medical History:   Diagnosis Date    Abnormal Pap smear of cervix     repeat without issue    Anemia     Depression     Gestational diabetes     Took metformin    Hypothyroidism      Past Surgical History:   Procedure Laterality Date     SECTION N/A 2019     SECTION [facility] Pac[facility]    TYMPANOSTOMY TUBE PLACEMENT      WISDOM TOOTH EXTRACTION       Family History   Problem Relation Age of Onset    Diabetes Father     Hypothyroidism Father     Rheum Arthritis Mother     Breast Cancer Paternal Aunt         Social History     Socioeconomic History    Marital status:      Spouse name: Not on file    Number of children: Not on file    Years of education: Not on file    Highest education level: Not on file   Occupational History    Not on file   Tobacco Use    Smoking status: Never Smoker    Smokeless tobacco: Never Used   Vaping Use    Vaping Use: Never used   Substance and ear normal.      Nose: Nose normal.      Mouth/Throat:      Mouth: Mucous membranes are moist.   Eyes:      General: Lids are normal. No scleral icterus. Extraocular Movements: Extraocular movements intact. Conjunctiva/sclera: Conjunctivae normal.   Neck:      Thyroid: Thyromegaly present. No thyroid mass or thyroid tenderness. Cardiovascular:      Rate and Rhythm: Normal rate and regular rhythm. Heart sounds: Normal heart sounds. No murmur heard. Pulmonary:      Effort: Pulmonary effort is normal. No respiratory distress. Breath sounds: Normal breath sounds. No wheezing. Musculoskeletal:         General: No tenderness or deformity. Normal range of motion. Cervical back: Normal range of motion and neck supple. Right lower leg: No edema. Left lower leg: No edema. Lymphadenopathy:      Cervical: No cervical adenopathy. Skin:     General: Skin is warm and dry. Findings: No rash. Neurological:      General: No focal deficit present. Mental Status: She is alert and oriented to person, place, and time. Psychiatric:         Mood and Affect: Mood and affect normal.         Speech: Speech normal.         Behavior: Behavior normal.         Thought Content:  Thought content normal.         Labs:  CBC with Differential:    Lab Results   Component Value Date    WBC 10.5 07/21/2021    RBC 4.78 07/21/2021    HGB 11.3 07/21/2021    HCT 37.6 07/21/2021     07/21/2021    MCV 78.7 07/21/2021    MCH 23.6 07/21/2021    MCHC 30.1 07/21/2021    RDW 16.2 07/21/2021    LYMPHOPCT 20.8 07/21/2021    MONOPCT 5.1 07/21/2021    BASOPCT 0.6 07/21/2021    MONOSABS 0.54 07/21/2021    LYMPHSABS 2.18 07/21/2021    EOSABS 0.28 07/21/2021    BASOSABS 0.06 07/21/2021     CMP:    Lab Results   Component Value Date     07/21/2021    K 4.0 07/21/2021     07/21/2021    CO2 23 07/21/2021    BUN 6 07/21/2021    CREATININE 0.8 07/21/2021    GFRAA >60 07/21/2021    LABGLOM >60 07/21/2021    GLUCOSE 83 07/21/2021    PROT 7.7 07/21/2021    LABALBU 4.6 07/21/2021    CALCIUM 9.1 07/21/2021    BILITOT 0.6 07/21/2021    ALKPHOS 81 07/21/2021    AST 21 07/21/2021    ALT 15 07/21/2021     HgBA1c:    Lab Results   Component Value Date    LABA1C 5.3 07/21/2021     FLP:    Lab Results   Component Value Date    TRIG 163 07/21/2021    HDL 37 07/21/2021    LDLCALC 119 07/21/2021    LABVLDL 33 07/21/2021     TSH:    Lab Results   Component Value Date    TSH 71.920 07/21/2021          Assessment and Plan:  Jarrell Severs was seen today for medication refill. Diagnoses and all orders for this visit:    Anxiety  -     desvenlafaxine succinate (PRISTIQ) 50 MG TB24 extended release tablet; Take 1 tablet by mouth daily  Stable at this time. Will continue same dosing until she gets pregnant. WIll then switch to Zoloft. Hashimoto's thyroiditis  Discussed need for labs. Already ordered. May add T3 replacement if still uncontrolled. Vitamin D deficiency  -     vitamin D (ERGOCALCIFEROL) 1.25 MG (17328 UT) CAPS capsule; Take 1 capsule by mouth once a week          Return in about 6 months (around 7/26/2022), or if symptoms worsen or fail to improve, for Well visit.       Seen By:  Herve Rosenthal DO

## 2022-04-07 ENCOUNTER — OFFICE VISIT (OUTPATIENT)
Dept: FAMILY MEDICINE CLINIC | Age: 32
End: 2022-04-07
Payer: COMMERCIAL

## 2022-04-07 VITALS
HEART RATE: 110 BPM | DIASTOLIC BLOOD PRESSURE: 82 MMHG | SYSTOLIC BLOOD PRESSURE: 116 MMHG | OXYGEN SATURATION: 97 % | TEMPERATURE: 97.9 F | BODY MASS INDEX: 40.82 KG/M2 | WEIGHT: 209 LBS

## 2022-04-07 DIAGNOSIS — H69.81 DYSFUNCTION OF RIGHT EUSTACHIAN TUBE: ICD-10-CM

## 2022-04-07 DIAGNOSIS — J34.89 SINUS DRAINAGE: Primary | ICD-10-CM

## 2022-04-07 DIAGNOSIS — J02.9 SORE THROAT: ICD-10-CM

## 2022-04-07 PROCEDURE — 99213 OFFICE O/P EST LOW 20 MIN: CPT | Performed by: INTERNAL MEDICINE

## 2022-04-07 RX ORDER — AZITHROMYCIN 250 MG/1
250 TABLET, FILM COATED ORAL SEE ADMIN INSTRUCTIONS
Qty: 6 TABLET | Refills: 0 | Status: SHIPPED | OUTPATIENT
Start: 2022-04-07 | End: 2022-04-12

## 2022-04-07 RX ORDER — PREDNISONE 10 MG/1
TABLET ORAL
Qty: 12 TABLET | Refills: 0 | Status: SHIPPED | OUTPATIENT
Start: 2022-04-07 | End: 2022-04-13

## 2022-04-07 ASSESSMENT — ENCOUNTER SYMPTOMS
SORE THROAT: 1
CHEST TIGHTNESS: 0
DIARRHEA: 0
VOMITING: 0
COUGH: 0
SINUS PAIN: 0
NAUSEA: 0
SINUS PRESSURE: 0
SHORTNESS OF BREATH: 0
ABDOMINAL PAIN: 0
EYES NEGATIVE: 1
WHEEZING: 0

## 2022-04-07 NOTE — PROGRESS NOTES
[address] [place]     22  Chalo Aldridge : 1990 Sex: female  Age: 27 y.o. Chief Complaint   Patient presents with    Congestion     onset yesterday, leaving Saturday for vacation    Pharyngitis       HPI    Giancarlo Escobar presents to express care today complaining of head congestion, sore throat, right ear fullness along with yellow mucus production starting yesterday throughout the day. Denies fever or chills. Denies cough or shortness of breath. No recent exposure. She is vaccinated to [employer]. Review of Systems   Constitutional: Negative for chills and fever. HENT: Positive for congestion, postnasal drip and sore throat. Negative for ear pain, sinus pressure and sinus pain. Right ear fullness   Eyes: Negative. Respiratory: Negative for cough, chest tightness, shortness of breath and wheezing. Cardiovascular: Negative for chest pain. Gastrointestinal: Negative for abdominal pain, diarrhea, nausea and vomiting. Musculoskeletal: Negative for myalgias. Neurological: Negative for headaches. REST OF PERTINENT ROS GONE OVER AND WAS NEGATIVE. Current Outpatient Medications:     azithromycin (ZITHROMAX) 250 MG tablet, Take 1 tablet by mouth See Admin Instructions for 5 days 500mg on day 1 followed by 250mg on days 2 - 5, Disp: 6 tablet, Rfl: 0    predniSONE (DELTASONE) 10 MG tablet, Take 3 tablets by mouth daily for 2 days, THEN 2 tablets daily for 2 days, THEN 1 tablet daily for 2 days. , Disp: 12 tablet, Rfl: 0    desvenlafaxine succinate (PRISTIQ) 50 MG TB24 extended release tablet, Take 1 tablet by mouth daily, Disp: 30 tablet, Rfl: 5    vitamin D (ERGOCALCIFEROL) 1.25 MG (34767 UT) CAPS capsule, Take 1 capsule by mouth once a week, Disp: 12 capsule, Rfl: 3    nystatin-triamcinolone (MYCOLOG II) 751388-5.1 UNIT/GM-% cream, Apply topically 2 times daily. , Disp: 60 g, Rfl: 1    levothyroxine (SYNTHROID) 200 MCG tablet, Take one tab PO daily with 2 tabs PO on Sat and Sun, Disp: 40 tablet, Rfl: 5    ferrous gluconate (FERGON) 324 (38 Fe) MG tablet, Take 1 tablet by mouth daily (with breakfast), Disp: 30 tablet, Rfl: 5  Allergies   Allergen Reactions    Avelox [Moxifloxacin] Swelling    Cefzil [Cefprozil] Swelling    Augmentin [Amoxicillin-Pot Clavulanate] Nausea And Vomiting     Also non narcotic pain medication       Past Medical History:   Diagnosis Date    Abnormal Pap smear of cervix     repeat without issue    Anemia     Depression     Gestational diabetes     Took metformin    Hypothyroidism      Past Surgical History:   Procedure Laterality Date     SECTION N/A 2019     SECTION performed by [facility] at [facility]    TYMPANOSTOMY TUBE PLACEMENT      WISDOM TOOTH EXTRACTION       Family History   Problem Relation Age of Onset    Diabetes Father     Hypothyroidism Father    [place] Outhouse Rheum Arthritis Mother     Breast Cancer Paternal Aunt      Social History     Socioeconomic History    Marital status:      Spouse name: Not on file    Number of children: Not on file    Years of education: Not on file    Highest education level: Not on file   Occupational History    Not on file   Tobacco Use    Smoking status: Never Smoker    Smokeless tobacco: Never Used   Vaping Use    Vaping Use: Never used   Substance and Sexual Activity    Alcohol use: Not Currently    Drug use: Never    Sexual activity: Yes     Partners: Male   Other Topics Concern    Not on file   Social History Narrative    Not on file     Social Determinants of Health     Financial Resource Strain:     Difficulty of Paying Living Expenses: Not on file   Food Insecurity:     Worried About Running Out of Food in the Last Year: Not on file    Dashawn of Food in the Last Year: Not on file   Transportation Needs:     Lack of Transportation (Medical): Not on file    Lack of Transportation (Non-Medical):  Not on file   Physical Activity:  Days of Exercise per Week: Not on file    Minutes of Exercise per Session: Not on file   Stress:     Feeling of Stress : Not on file   Social Connections:     Frequency of Communication with Friends and Family: Not on file    Frequency of Social Gatherings with Friends and Family: Not on file    Attends Mu-ism Services: Not on file    Active Member of [address] [place] [address] or Organizations: Not on file    Attends Club or Organization Meetings: Not on file    Marital Status: Not on file   Intimate Partner Violence:     Fear of Current or Ex-Partner: Not on file    Emotionally Abused: Not on file    Physically Abused: Not on file    Sexually Abused: Not on file   Housing Stability:     Unable to Pay for Housing in the Last Year: Not on file    Number of [place] in the Last Year: Not on file    Unstable Housing in the Last Year: Not on file       Vitals:    04/07/22 0833 04/07/22 0851   BP: 116/82    Pulse: 122 110   Temp: 97.9 °F (36.6 °C)    TempSrc: Temporal    SpO2: 97%    Weight: 209 lb (94.8 kg)        Physical Exam  Vitals and nursing note reviewed. Constitutional:       General: She is not in acute distress. Appearance: She is well-developed. HENT:      Head: Normocephalic and atraumatic. Right Ear: Tympanic membrane, ear canal and external ear normal.      Left Ear: Tympanic membrane, ear canal and external ear normal.      Nose: Nose normal.      Mouth/Throat:      Mouth: Mucous membranes are moist.      Pharynx: Oropharynx is clear. Posterior oropharyngeal erythema present. No oropharyngeal exudate. Cardiovascular:      Rate and Rhythm: Normal rate and regular rhythm. Heart sounds: Normal heart sounds. No murmur heard. Pulmonary:      Effort: Pulmonary effort is normal. No respiratory distress. Breath sounds: Normal breath sounds. No wheezing, rhonchi or rales. Musculoskeletal:      Cervical back: Normal range of motion and neck supple.    Lymphadenopathy: Cervical: No cervical adenopathy. Skin:     General: Skin is warm and dry. Neurological:      Mental Status: She is alert and oriented to person, place, and time. Psychiatric:         Mood and Affect: Mood normal.         Behavior: Behavior normal.         Thought Content: Thought content normal.         Judgment: Judgment normal.                 Assessment and Plan:  Jamari Rodriguez was seen today for congestion and pharyngitis. Diagnoses and all orders for this visit:    Sinus drainage  -     azithromycin (ZITHROMAX) 250 MG tablet; Take 1 tablet by mouth See Admin Instructions for 5 days 500mg on day 1 followed by 250mg on days 2 - 5    Sore throat  -     azithromycin (ZITHROMAX) 250 MG tablet; Take 1 tablet by mouth See Admin Instructions for 5 days 500mg on day 1 followed by 250mg on days 2 - 5    Dysfunction of right eustachian tube  -     azithromycin (ZITHROMAX) 250 MG tablet; Take 1 tablet by mouth See Admin Instructions for 5 days 500mg on day 1 followed by 250mg on days 2 - 5    Other orders  -     predniSONE (DELTASONE) 10 MG tablet; Take 3 tablets by mouth daily for 2 days, THEN 2 tablets daily for 2 days, THEN 1 tablet daily for 2 days. Plan: Z-Daron, prednisone taper dose, 1 potential side effects. Probiotic. Push fluids. Follow-up with PCP. Notify us if not improving. Patient did states she is not pregnant. No follow-ups on file. Seen By:  Maria Del Carmen Andrade MD      *Document was created using voice recognition software. Note was reviewed however may contain grammatical errors.

## 2022-04-22 RX ORDER — SULFAMETHOXAZOLE AND TRIMETHOPRIM 800; 160 MG/1; MG/1
1 TABLET ORAL 2 TIMES DAILY
Qty: 14 TABLET | Refills: 0 | Status: SHIPPED | OUTPATIENT
Start: 2022-04-22 | End: 2022-04-29

## 2022-04-22 RX ORDER — PREDNISONE 10 MG/1
TABLET ORAL
Qty: 30 TABLET | Refills: 0 | Status: SHIPPED | OUTPATIENT
Start: 2022-04-22 | End: 2022-05-04

## 2022-05-13 ENCOUNTER — OFFICE VISIT (OUTPATIENT)
Dept: FAMILY MEDICINE CLINIC | Age: 32
End: 2022-05-13
Payer: COMMERCIAL

## 2022-05-13 VITALS
SYSTOLIC BLOOD PRESSURE: 144 MMHG | BODY MASS INDEX: 41.15 KG/M2 | DIASTOLIC BLOOD PRESSURE: 92 MMHG | WEIGHT: 209.6 LBS | OXYGEN SATURATION: 98 % | HEIGHT: 60 IN | TEMPERATURE: 98.5 F | HEART RATE: 102 BPM

## 2022-05-13 DIAGNOSIS — F41.1 GAD (GENERALIZED ANXIETY DISORDER): Primary | ICD-10-CM

## 2022-05-13 DIAGNOSIS — F33.1 MODERATE EPISODE OF RECURRENT MAJOR DEPRESSIVE DISORDER (HCC): ICD-10-CM

## 2022-05-13 PROCEDURE — 99213 OFFICE O/P EST LOW 20 MIN: CPT | Performed by: FAMILY MEDICINE

## 2022-05-13 RX ORDER — LORAZEPAM 0.5 MG/1
0.5 TABLET ORAL 3 TIMES DAILY PRN
Qty: 42 TABLET | Refills: 0 | Status: SHIPPED | OUTPATIENT
Start: 2022-05-13 | End: 2022-05-27

## 2022-05-13 RX ORDER — DESVENLAFAXINE 100 MG/1
100 TABLET, EXTENDED RELEASE ORAL DAILY
Qty: 90 TABLET | Refills: 1 | Status: SHIPPED
Start: 2022-05-13 | End: 2022-06-24 | Stop reason: DRUGHIGH

## 2022-05-13 ASSESSMENT — PATIENT HEALTH QUESTIONNAIRE - PHQ9
2. FEELING DOWN, DEPRESSED OR HOPELESS: 2
5. POOR APPETITE OR OVEREATING: 2
6. FEELING BAD ABOUT YOURSELF - OR THAT YOU ARE A FAILURE OR HAVE LET YOURSELF OR YOUR FAMILY DOWN: 2
SUM OF ALL RESPONSES TO PHQ QUESTIONS 1-9: 12
1. LITTLE INTEREST OR PLEASURE IN DOING THINGS: 2
9. THOUGHTS THAT YOU WOULD BE BETTER OFF DEAD, OR OF HURTING YOURSELF: 1
SUM OF ALL RESPONSES TO PHQ QUESTIONS 1-9: 12
SUM OF ALL RESPONSES TO PHQ9 QUESTIONS 1 & 2: 4
8. MOVING OR SPEAKING SO SLOWLY THAT OTHER PEOPLE COULD HAVE NOTICED. OR THE OPPOSITE, BEING SO FIGETY OR RESTLESS THAT YOU HAVE BEEN MOVING AROUND A LOT MORE THAN USUAL: 0
3. TROUBLE FALLING OR STAYING ASLEEP: 1
7. TROUBLE CONCENTRATING ON THINGS, SUCH AS READING THE NEWSPAPER OR WATCHING TELEVISION: 1
SUM OF ALL RESPONSES TO PHQ QUESTIONS 1-9: 12
SUM OF ALL RESPONSES TO PHQ QUESTIONS 1-9: 11
4. FEELING TIRED OR HAVING LITTLE ENERGY: 1
10. IF YOU CHECKED OFF ANY PROBLEMS, HOW DIFFICULT HAVE THESE PROBLEMS MADE IT FOR YOU TO DO YOUR WORK, TAKE CARE OF THINGS AT HOME, OR GET ALONG WITH OTHER PEOPLE: 2

## 2022-05-13 ASSESSMENT — ENCOUNTER SYMPTOMS
NAUSEA: 0
BACK PAIN: 0
CONSTIPATION: 0
ABDOMINAL PAIN: 0
COUGH: 0
DIARRHEA: 0
VOMITING: 0
SHORTNESS OF BREATH: 0
WHEEZING: 0

## 2022-05-13 ASSESSMENT — COLUMBIA-SUICIDE SEVERITY RATING SCALE - C-SSRS
1. WITHIN THE PAST MONTH, HAVE YOU WISHED YOU WERE DEAD OR WISHED YOU COULD GO TO SLEEP AND NOT WAKE UP?: YES
6. HAVE YOU EVER DONE ANYTHING, STARTED TO DO ANYTHING, OR PREPARED TO DO ANYTHING TO END YOUR LIFE?: NO
2. HAVE YOU ACTUALLY HAD ANY THOUGHTS OF KILLING YOURSELF?: NO

## 2022-05-13 NOTE — PROGRESS NOTES
22  Kushal Gut : 1990 Sex: female  Age: 27 y.o. Chief Complaint   Patient presents with    Chest Pain     HPI:  27 y.o. female presents today for acute visit due to anxiety/depression. Patient's chart, medical, surgical and medication history all reviewed. Anxiety  Patient complains of evaluation of anxiety disorder and panic attacks. She has the following anxiety symptoms: difficulty concentrating, feelings of losing control, racing thoughts. Onset of symptoms was approximately several months ago, gradually worsening since that time. She denies current suicidal and homicidal ideation. Possible organic causes contributing are: endocrine/metabolic. Risk factors: previous episode of depression. Previous treatment includes BuSpar, Wellbutrin, Zoloft and Pristiq, Vistaril and individual therapy. She complains of the following side effects from the treatment: none. Depression  Patient complains of depression. She complains of anhedonia, depressed mood, difficulty concentrating, fatigue, feelings of worthlessness/guilt, insomnia, psychomotor retardation and suicidal thoughts without plan. Onset was approximately several weeks ago, gradually worsening since that time. She denies current suicidal and homicidal plan or intent. Previous treatment includes wellbutrin, Zoloft and individual therapy. She complains of the following side effects from the treatment: increased anxiety with Wellbutrin. PHQ-9 Total Score: 12 (2022 11:49 AM)  Thoughts that you would be better off dead, or of hurting yourself in some way: 1 (2022 11:49 AM)      ROS:  Review of Systems   Constitutional: Negative for chills, fatigue and fever. Respiratory: Negative for cough, shortness of breath and wheezing. Cardiovascular: Positive for chest pain. Negative for palpitations. Gastrointestinal: Negative for abdominal pain, constipation, diarrhea, nausea and vomiting.    Musculoskeletal: Negative for arthralgias and back pain. Skin: Negative for rash. Neurological: Negative for dizziness and headaches. Psychiatric/Behavioral: Positive for dysphoric mood, sleep disturbance and suicidal ideas. The patient is nervous/anxious. All other systems reviewed and are negative. Current Outpatient Medications on File Prior to Visit   Medication Sig Dispense Refill    vitamin D (ERGOCALCIFEROL) 1.25 MG (25624 UT) CAPS capsule Take 1 capsule by mouth once a week 12 capsule 3    levothyroxine (SYNTHROID) 200 MCG tablet Take one tab PO daily with 2 tabs PO on Sat and Sun 40 tablet 5    ferrous gluconate (FERGON) 324 (38 Fe) MG tablet Take 1 tablet by mouth daily (with breakfast) 30 tablet 5     No current facility-administered medications on file prior to visit.        Allergies   Allergen Reactions    Avelox [Moxifloxacin] Swelling    Cefzil [Cefprozil] Swelling    Augmentin [Amoxicillin-Pot Clavulanate] Nausea And Vomiting     Also non narcotic pain medication       Past Medical History:   Diagnosis Date    Abnormal Pap smear of cervix     repeat without issue    Anemia     Depression     Gestational diabetes     Took metformin    Hypothyroidism      Past Surgical History:   Procedure Laterality Date     SECTION N/A 2019     SECTION performed by Africa Osorio DO at [facility] L[facility]    TYMPANOSTOMY TUBE PLACEMENT      WISDOM TOOTH EXTRACTION       Family History   Problem Relation Age of Onset    Diabetes Father     Hypothyroidism Father     Rheum Arthritis Mother     Breast Cancer Paternal Aunt         Social History     Socioeconomic History    Marital status:      Spouse name: Not on file    Number of children: Not on file    Years of education: Not on file    Highest education level: Not on file   Occupational History    Not on file   Tobacco Use    Smoking status: Never Smoker    Smokeless tobacco: Never Used   Vaping Use    Vaping Use: Never used Substance and Sexual Activity    Alcohol use: Not Currently    Drug use: Never    Sexual activity: Yes     Partners: Male   Other Topics Concern    Not on file   Social History Narrative    Not on file     Social Determinants of Health     Financial Resource Strain:     Difficulty of Paying Living Expenses: Not on file   Food Insecurity:     Worried About Running Out of Food in the Last Year: Not on file    Dashawn of Food in the Last Year: Not on file   Transportation Needs:     Lack of Transportation (Medical): Not on file    Lack of Transportation (Non-Medical): Not on file   Physical Activity:     Days of Exercise per Week: Not on file    Minutes of Exercise per Session: Not on file   Stress:     Feeling of Stress : Not on file   Social Connections:     Frequency of Communication with Friends and Family: Not on file    Frequency of Social Gatherings with Friends and Family: Not on file    Attends Latter day Services: Not on file    Active Member of [address] [place] [address] or Organizations: Not on file    Attends Club or Organization Meetings: Not on file    Marital Status: Not on file   Intimate Partner Violence:     Fear of Current or Ex-Partner: Not on file    Emotionally Abused: Not on file    Physically Abused: Not on file    Sexually Abused: Not on file   Housing Stability:     Unable to Pay for Housing in the Last Year: Not on file    Number of [place] in the Last Year: Not on file    Unstable Housing in the Last Year: Not on file       Vitals:    05/13/22 1146   BP: (!) 144/92   Pulse: 102   Temp: 98.5 °F (36.9 °C)   SpO2: 98%   Weight: 209 lb 9.6 oz (95.1 kg)   Height: 5' (1.524 m)       Physical Exam:  Physical Exam  Vitals and nursing note reviewed. Constitutional:       General: She is not in acute distress. Appearance: Normal appearance. She is well-developed. She is obese. She is not ill-appearing. HENT:      Head: Normocephalic and atraumatic.       Right Ear: Hearing and external ear normal.      Left Ear: Hearing and external ear normal.      Nose:      Comments: Wearing mask  Eyes:      General: Lids are normal. No scleral icterus. Extraocular Movements: Extraocular movements intact. Conjunctiva/sclera: Conjunctivae normal.   Neck:      Thyroid: Thyromegaly present. No thyroid mass or thyroid tenderness. Cardiovascular:      Rate and Rhythm: Normal rate and regular rhythm. Heart sounds: Normal heart sounds. No murmur heard. Pulmonary:      Effort: Pulmonary effort is normal. No respiratory distress. Breath sounds: Normal breath sounds. No wheezing. Musculoskeletal:         General: No tenderness or deformity. Normal range of motion. Cervical back: Normal range of motion and neck supple. Right lower leg: No edema. Left lower leg: No edema. Lymphadenopathy:      Cervical: No cervical adenopathy. Skin:     General: Skin is warm and dry. Findings: No rash. Neurological:      General: No focal deficit present. Mental Status: She is alert and oriented to person, place, and time. Gait: Gait normal.   Psychiatric:         Mood and Affect: Mood is anxious and depressed. Affect is tearful. Speech: Speech normal.         Behavior: Behavior normal.         Thought Content:  Thought content normal.         Labs:  CBC with Differential:    Lab Results   Component Value Date    WBC 10.5 07/21/2021    RBC 4.78 07/21/2021    HGB 11.3 07/21/2021    HCT 37.6 07/21/2021     07/21/2021    MCV 78.7 07/21/2021    MCH 23.6 07/21/2021    MCHC 30.1 07/21/2021    RDW 16.2 07/21/2021    LYMPHOPCT 20.8 07/21/2021    MONOPCT 5.1 07/21/2021    BASOPCT 0.6 07/21/2021    MONOSABS 0.54 07/21/2021    LYMPHSABS 2.18 07/21/2021    EOSABS 0.28 07/21/2021    BASOSABS 0.06 07/21/2021     CMP:    Lab Results   Component Value Date     07/21/2021    K 4.0 07/21/2021     07/21/2021    CO2 23 07/21/2021    BUN 6 07/21/2021 CREATININE 0.8 07/21/2021    GFRAA >60 07/21/2021    LABGLOM >60 07/21/2021    GLUCOSE 83 07/21/2021    PROT 7.7 07/21/2021    LABALBU 4.6 07/21/2021    CALCIUM 9.1 07/21/2021    BILITOT 0.6 07/21/2021    ALKPHOS 81 07/21/2021    AST 21 07/21/2021    ALT 15 07/21/2021     HgBA1c:    Lab Results   Component Value Date    LABA1C 5.3 07/21/2021     FLP:    Lab Results   Component Value Date    TRIG 163 07/21/2021    HDL 37 07/21/2021    LDLCALC 119 07/21/2021    LABVLDL 33 07/21/2021     TSH:    Lab Results   Component Value Date    TSH 71.920 07/21/2021          Assessment and Plan:  Mati Flower was seen today for chest pain. Diagnoses and all orders for this visit:    NORIS (generalized anxiety disorder)  -     desvenlafaxine succinate (PRISTIQ) 100 MG TB24 extended release tablet; Take 1 tablet by mouth daily  -     LORazepam (ATIVAN) 0.5 MG tablet; Take 1 tablet by mouth 3 times daily as needed for Anxiety for up to 14 days. Moderate episode of recurrent major depressive disorder (HCC)  -     desvenlafaxine succinate (PRISTIQ) 100 MG TB24 extended release tablet; Take 1 tablet by mouth daily    Severely worsening symptoms in the last several months. Discussed options and safety plans. Will increase Pristiq and add Ativan for acute panic episodes. May need mood stabilizer. Return in about 1 week (around 5/20/2022), or if symptoms worsen or fail to improve, for Recheck.       Seen By:  Liliam Kulkarni DO

## 2022-06-10 RX ORDER — BUPROPION HYDROCHLORIDE 150 MG/1
150 TABLET ORAL EVERY MORNING
Qty: 30 TABLET | Refills: 5 | Status: SHIPPED
Start: 2022-06-10 | End: 2022-07-29 | Stop reason: SDUPTHER

## 2022-06-19 DIAGNOSIS — F41.9 ANXIETY: ICD-10-CM

## 2022-06-20 RX ORDER — DESVENLAFAXINE 50 MG/1
TABLET, EXTENDED RELEASE ORAL
Qty: 90 TABLET | Refills: 0 | OUTPATIENT
Start: 2022-06-20

## 2022-06-24 ENCOUNTER — TELEPHONE (OUTPATIENT)
Dept: FAMILY MEDICINE CLINIC | Age: 32
End: 2022-06-24

## 2022-06-24 RX ORDER — DESVENLAFAXINE 50 MG/1
50 TABLET, EXTENDED RELEASE ORAL DAILY
Qty: 30 TABLET | Refills: 5 | Status: SHIPPED
Start: 2022-06-24 | End: 2022-07-29 | Stop reason: SDUPTHER

## 2022-06-24 NOTE — TELEPHONE ENCOUNTER
Patient calling in for refill on Pristiq. States dose should be 50mg, chart only has the 100mg. Could you send to [facility]?

## 2022-07-28 ENCOUNTER — TELEPHONE (OUTPATIENT)
Dept: FAMILY MEDICINE CLINIC | Age: 32
End: 2022-07-28

## 2022-07-28 DIAGNOSIS — E55.9 VITAMIN D INSUFFICIENCY: ICD-10-CM

## 2022-07-28 DIAGNOSIS — E06.3 HASHIMOTO'S THYROIDITIS: ICD-10-CM

## 2022-07-28 DIAGNOSIS — R73.01 IMPAIRED FASTING GLUCOSE: ICD-10-CM

## 2022-07-28 DIAGNOSIS — Z00.01 ENCOUNTER FOR WELL ADULT EXAM WITH ABNORMAL FINDINGS: Primary | ICD-10-CM

## 2022-07-28 DIAGNOSIS — E78.2 MIXED HYPERLIPIDEMIA: ICD-10-CM

## 2022-07-28 NOTE — TELEPHONE ENCOUNTER
Patient calling in to see if she has active blood work orders. She does not. Previous orders are now . Could you please place new orders for patient?  She wanted to get these after work today

## 2022-07-29 ENCOUNTER — OFFICE VISIT (OUTPATIENT)
Dept: FAMILY MEDICINE CLINIC | Age: 32
End: 2022-07-29
Payer: COMMERCIAL

## 2022-07-29 VITALS
BODY MASS INDEX: 40.44 KG/M2 | WEIGHT: 206 LBS | HEIGHT: 60 IN | SYSTOLIC BLOOD PRESSURE: 126 MMHG | OXYGEN SATURATION: 98 % | DIASTOLIC BLOOD PRESSURE: 80 MMHG | TEMPERATURE: 96.9 F | HEART RATE: 110 BPM | RESPIRATION RATE: 16 BRPM

## 2022-07-29 DIAGNOSIS — E06.3 HASHIMOTO'S THYROIDITIS: ICD-10-CM

## 2022-07-29 DIAGNOSIS — F33.1 MODERATE EPISODE OF RECURRENT MAJOR DEPRESSIVE DISORDER (HCC): ICD-10-CM

## 2022-07-29 DIAGNOSIS — Z00.01 ENCOUNTER FOR WELL ADULT EXAM WITH ABNORMAL FINDINGS: Primary | ICD-10-CM

## 2022-07-29 DIAGNOSIS — E55.9 VITAMIN D DEFICIENCY: ICD-10-CM

## 2022-07-29 DIAGNOSIS — F41.1 GAD (GENERALIZED ANXIETY DISORDER): ICD-10-CM

## 2022-07-29 DIAGNOSIS — D50.8 OTHER IRON DEFICIENCY ANEMIA: ICD-10-CM

## 2022-07-29 PROCEDURE — 99395 PREV VISIT EST AGE 18-39: CPT | Performed by: FAMILY MEDICINE

## 2022-07-29 RX ORDER — DOXYCYCLINE HYCLATE 50 MG/1
324 CAPSULE, GELATIN COATED ORAL
Qty: 90 TABLET | Refills: 1 | Status: SHIPPED
Start: 2022-07-29 | End: 2022-09-27 | Stop reason: SDUPTHER

## 2022-07-29 RX ORDER — DESVENLAFAXINE 50 MG/1
50 TABLET, EXTENDED RELEASE ORAL DAILY
Qty: 90 TABLET | Refills: 1 | Status: SHIPPED | OUTPATIENT
Start: 2022-07-29

## 2022-07-29 RX ORDER — BUPROPION HYDROCHLORIDE 150 MG/1
150 TABLET ORAL EVERY MORNING
Qty: 90 TABLET | Refills: 1 | Status: SHIPPED | OUTPATIENT
Start: 2022-07-29

## 2022-07-29 RX ORDER — LEVOTHYROXINE SODIUM 0.2 MG/1
TABLET ORAL
Qty: 100 TABLET | Refills: 1 | Status: SHIPPED
Start: 2022-07-29 | End: 2022-09-27 | Stop reason: DRUGHIGH

## 2022-07-29 RX ORDER — ERGOCALCIFEROL 1.25 MG/1
50000 CAPSULE ORAL WEEKLY
Qty: 12 CAPSULE | Refills: 3 | Status: SHIPPED
Start: 2022-07-29 | End: 2022-09-27 | Stop reason: ALTCHOICE

## 2022-07-29 SDOH — ECONOMIC STABILITY: FOOD INSECURITY: WITHIN THE PAST 12 MONTHS, THE FOOD YOU BOUGHT JUST DIDN'T LAST AND YOU DIDN'T HAVE MONEY TO GET MORE.: NEVER TRUE

## 2022-07-29 SDOH — ECONOMIC STABILITY: FOOD INSECURITY: WITHIN THE PAST 12 MONTHS, YOU WORRIED THAT YOUR FOOD WOULD RUN OUT BEFORE YOU GOT MONEY TO BUY MORE.: NEVER TRUE

## 2022-07-29 ASSESSMENT — ENCOUNTER SYMPTOMS
DIARRHEA: 0
COUGH: 0
WHEEZING: 0
BACK PAIN: 0
NAUSEA: 0
CONSTIPATION: 0
VOMITING: 0
SHORTNESS OF BREATH: 0
ABDOMINAL PAIN: 0

## 2022-07-29 ASSESSMENT — SOCIAL DETERMINANTS OF HEALTH (SDOH): HOW HARD IS IT FOR YOU TO PAY FOR THE VERY BASICS LIKE FOOD, HOUSING, MEDICAL CARE, AND HEATING?: NOT HARD AT ALL

## 2022-07-29 NOTE — PROGRESS NOTES
22  Buell Harada : 1990 Sex: female  Age: 27 y.o. Chief Complaint   Patient presents with    Annual Exam       HPI:  27 y.o. female presents today for annual physical.  Patient's chart, medical, surgical and medication history all reviewed. Well Adult Physical  Patient here for a physical exam.  The patient reports no problems. Do you take any herbs or supplements that were not prescribed by a doctor? no   Are you taking calcium supplements? no   Are you taking aspirin daily? no    Colonoscopy: No prior colonoscopy  Dental visit: Within last 6 mos  Vision check:  No Problems  PAP:  2022 with Dr. [facility]    Last time routine bloodwork was done:  2021    Immunization status: up to date and documented. Smoking status: never    Physical activity:  infrequently      ROS:  Review of Systems   Constitutional:  Negative for chills, fatigue and fever. Respiratory:  Negative for cough, shortness of breath and wheezing. Cardiovascular:  Negative for chest pain and palpitations. Gastrointestinal:  Negative for abdominal pain, constipation, diarrhea, nausea and vomiting. Musculoskeletal:  Negative for arthralgias and back pain. Skin:  Negative for rash. Neurological:  Negative for dizziness and headaches. Psychiatric/Behavioral:  Positive for dysphoric mood. The patient is nervous/anxious. All other systems reviewed and are negative.    Current Outpatient Medications on File Prior to Visit   Medication Sig Dispense Refill    desvenlafaxine succinate (PRISTIQ) 50 MG TB24 extended release tablet Take 1 tablet by mouth daily 30 tablet 5    buPROPion (WELLBUTRIN XL) 150 MG extended release tablet Take 1 tablet by mouth every morning 30 tablet 5    vitamin D (ERGOCALCIFEROL) 1.25 MG (60712 UT) CAPS capsule Take 1 capsule by mouth once a week 12 capsule 3    levothyroxine (SYNTHROID) 200 MCG tablet Take one tab PO daily with 2 tabs PO on Sat and Sun 40 tablet 5    ferrous gluconate (FERGON) 324 (38 Fe) MG tablet Take 1 tablet by mouth daily (with breakfast) 30 tablet 5     No current facility-administered medications on file prior to visit.        Allergies   Allergen Reactions    Avelox [Moxifloxacin] Swelling    Cefzil [Cefprozil] Swelling    Augmentin [Amoxicillin-Pot Clavulanate] Nausea And Vomiting     Also non narcotic pain medication       Past Medical History:   Diagnosis Date    Abnormal Pap smear of cervix     repeat without issue    Anemia     Depression     Gestational diabetes     Took metformin    Hypothyroidism      Past Surgical History:   Procedure Laterality Date     SECTION N/A 2019     SECTION performed by Domi Rodríguez DO at [facility]    TYMPANOSTOMY TUBE PLACEMENT      WISDOM TOOTH EXTRACTION       Family History   Problem Relation Age of Onset    Diabetes Father     Hypothyroidism Father     Rheum Arthritis Mother     Breast Cancer Paternal Aunt      Social History     Socioeconomic History    Marital status:      Spouse name: Not on file    Number of children: Not on file    Years of education: Not on file    Highest education level: Not on file   Occupational History    Not on file   Tobacco Use    Smoking status: Never    Smokeless tobacco: Never   Vaping Use    Vaping Use: Never used   Substance and Sexual Activity    Alcohol use: Not Currently    Drug use: Never    Sexual activity: Yes     Partners: Male   Other Topics Concern    Not on file   Social History Narrative    Not on file     Social Determinants of Health     Financial Resource Strain: Low Risk     Difficulty of Paying Living Expenses: Not hard at all   Food Insecurity: No Food Insecurity    Worried About Running Out of Food in the Last Year: Never true    Ran Out of Food in the Last Year: Never true   Transportation Needs: Not on file   Physical Activity: Not on file   Stress: Not on file   Social Connections: Not on file   Intimate Partner Violence: Not on file   Housing Stability: Not on file       Vitals:    07/29/22 0815   BP: 126/80   Pulse: (!) 110   Resp: 16   Temp: 96.9 °F (36.1 °C)   TempSrc: Temporal   SpO2: 98%   Weight: 206 lb (93.4 kg)   Height: 5' (1.524 m)       Physical Exam:  Physical Exam  Vitals and nursing note reviewed. Constitutional:       General: She is not in acute distress. Appearance: Normal appearance. She is well-developed. She is obese. She is not ill-appearing. HENT:      Head: Normocephalic and atraumatic. Right Ear: Hearing and external ear normal.      Left Ear: Hearing and external ear normal.      Nose: Nose normal.      Mouth/Throat:      Mouth: Mucous membranes are moist.   Eyes:      General: Lids are normal. No scleral icterus. Extraocular Movements: Extraocular movements intact. Conjunctiva/sclera: Conjunctivae normal.   Neck:      Thyroid: Thyromegaly present. No thyroid mass or thyroid tenderness. Cardiovascular:      Rate and Rhythm: Normal rate and regular rhythm. Heart sounds: Normal heart sounds. No murmur heard. Pulmonary:      Effort: Pulmonary effort is normal. No respiratory distress. Breath sounds: Normal breath sounds. No wheezing. Musculoskeletal:         General: No tenderness or deformity. Normal range of motion. Cervical back: Normal range of motion and neck supple. Right lower leg: No edema. Left lower leg: No edema. Lymphadenopathy:      Cervical: No cervical adenopathy. Skin:     General: Skin is warm and dry. Findings: No rash. Neurological:      General: No focal deficit present. Mental Status: She is alert and oriented to person, place, and time. Gait: Gait normal.   Psychiatric:         Mood and Affect: Mood and affect normal.         Speech: Speech normal.         Behavior: Behavior normal.         Thought Content:  Thought content normal.       Labs:  CBC with Differential:    Lab Results   Component Value Date/Time    WBC 10.5 07/21/2021 08:49 AM    RBC 4.78 07/21/2021 08:49 AM    HGB 11.3 07/21/2021 08:49 AM    HCT 37.6 07/21/2021 08:49 AM     07/21/2021 08:49 AM    MCV 78.7 07/21/2021 08:49 AM    MCH 23.6 07/21/2021 08:49 AM    MCHC 30.1 07/21/2021 08:49 AM    RDW 16.2 07/21/2021 08:49 AM    LYMPHOPCT 20.8 07/21/2021 08:49 AM    MONOPCT 5.1 07/21/2021 08:49 AM    BASOPCT 0.6 07/21/2021 08:49 AM    MONOSABS 0.54 07/21/2021 08:49 AM    LYMPHSABS 2.18 07/21/2021 08:49 AM    EOSABS 0.28 07/21/2021 08:49 AM    BASOSABS 0.06 07/21/2021 08:49 AM     CMP:    Lab Results   Component Value Date/Time     07/21/2021 08:49 AM    K 4.0 07/21/2021 08:49 AM     07/21/2021 08:49 AM    CO2 23 07/21/2021 08:49 AM    BUN 6 07/21/2021 08:49 AM    CREATININE 0.8 07/21/2021 08:49 AM    GFRAA >60 07/21/2021 08:49 AM    LABGLOM >60 07/21/2021 08:49 AM    GLUCOSE 83 07/21/2021 08:49 AM    PROT 7.7 07/21/2021 08:49 AM    LABALBU 4.6 07/21/2021 08:49 AM    CALCIUM 9.1 07/21/2021 08:49 AM    BILITOT 0.6 07/21/2021 08:49 AM    ALKPHOS 81 07/21/2021 08:49 AM    AST 21 07/21/2021 08:49 AM    ALT 15 07/21/2021 08:49 AM     U/A:    Lab Results   Component Value Date/Time    NITRITE negative 01/08/2021 08:49 AM    COLORU Yellow 07/21/2021 08:49 AM    PROTEINU Negative 07/21/2021 08:49 AM    PHUR 8.0 07/21/2021 08:49 AM    WBCUA 1-3 12/18/2019 02:15 PM    RBCUA NONE 12/18/2019 02:15 PM    MUCUS Present 12/18/2019 02:15 PM    BACTERIA MODERATE 12/18/2019 02:15 PM    CLARITYU Clear 07/21/2021 08:49 AM    SPECGRAV 1.020 07/21/2021 08:49 AM    LEUKOCYTESUR Negative 07/21/2021 08:49 AM    UROBILINOGEN 0.2 07/21/2021 08:49 AM    BILIRUBINUR Negative 07/21/2021 08:49 AM    BILIRUBINUR negative 01/08/2021 08:49 AM    BLOODU Negative 07/21/2021 08:49 AM    GLUCOSEU Negative 07/21/2021 08:49 AM     HgBA1c:    Lab Results   Component Value Date/Time    LABA1C 5.3 07/21/2021 08:49 AM     FLP:    Lab Results   Component Value Date/Time    TRIG 163 07/21/2021 08:49 AM    HDL 37 07/21/2021 08:49 AM    LDLCALC 119 07/21/2021 08:49 AM    LABVLDL 33 07/21/2021 08:49 AM     TSH:    Lab Results   Component Value Date/Time    TSH 71.920 07/21/2021 08:49 AM     IRON:    Lab Results   Component Value Date/Time    IRON 49 07/21/2021 08:49 AM     Iron Saturation:  No components found for: PERCENTFE  TIBC:    Lab Results   Component Value Date/Time    TIBC 398 07/21/2021 08:49 AM     FERRITIN:    Lab Results   Component Value Date/Time    FERRITIN 9 07/21/2021 08:49 AM        Assessment and Plan:  Steffi Meraz was seen today for annual exam.    Diagnoses and all orders for this visit:    Encounter for well adult exam with abnormal findings  Patient doing well overall. Due for fasting labs- already ordered. UTD on HM. Hashimoto's thyroiditis  -     levothyroxine (SYNTHROID) 200 MCG tablet; Take one tab PO daily with 2 tabs PO on Sat and Sun  -     T3, Free; Future  Discussed adding T3 replacement if still poorly controlled    Moderate episode of recurrent major depressive disorder (HCC)  -     buPROPion (WELLBUTRIN XL) 150 MG extended release tablet; Take 1 tablet by mouth every morning    NORIS (generalized anxiety disorder)  -     desvenlafaxine succinate (PRISTIQ) 50 MG TB24 extended release tablet; Take 1 tablet by mouth in the morning. Stable    Other iron deficiency anemia  -     ferrous gluconate (FERGON) 324 (38 Fe) MG tablet; Take 1 tablet by mouth daily (with breakfast)  -     Iron and TIBC; Future  -     Ferritin; Future    Vitamin D deficiency  -     vitamin D (ERGOCALCIFEROL) 1.25 MG (18625 UT) CAPS capsule; Take 1 capsule by mouth once a week        Return in about 6 months (around 1/29/2023), or if symptoms worsen or fail to improve, for Chronic medical conditions.       Seen By:  Jimbo Norton DO

## 2022-09-19 ENCOUNTER — OFFICE VISIT (OUTPATIENT)
Dept: FAMILY MEDICINE CLINIC | Age: 32
End: 2022-09-19
Payer: COMMERCIAL

## 2022-09-19 VITALS
TEMPERATURE: 98 F | DIASTOLIC BLOOD PRESSURE: 80 MMHG | BODY MASS INDEX: 40.44 KG/M2 | RESPIRATION RATE: 16 BRPM | WEIGHT: 206 LBS | SYSTOLIC BLOOD PRESSURE: 132 MMHG | OXYGEN SATURATION: 98 % | HEART RATE: 103 BPM | HEIGHT: 60 IN

## 2022-09-19 DIAGNOSIS — B96.89 ACUTE BACTERIAL SINUSITIS: Primary | ICD-10-CM

## 2022-09-19 DIAGNOSIS — J01.90 ACUTE BACTERIAL SINUSITIS: Primary | ICD-10-CM

## 2022-09-19 PROCEDURE — 99213 OFFICE O/P EST LOW 20 MIN: CPT | Performed by: STUDENT IN AN ORGANIZED HEALTH CARE EDUCATION/TRAINING PROGRAM

## 2022-09-19 RX ORDER — DOXYCYCLINE HYCLATE 100 MG
100 TABLET ORAL 2 TIMES DAILY
Qty: 14 TABLET | Refills: 0 | Status: SHIPPED | OUTPATIENT
Start: 2022-09-19 | End: 2022-09-26

## 2022-09-19 ASSESSMENT — ENCOUNTER SYMPTOMS
VOICE CHANGE: 1
SHORTNESS OF BREATH: 0
ABDOMINAL PAIN: 0
VOMITING: 0
SINUS PAIN: 1
NAUSEA: 0
COUGH: 1
RHINORRHEA: 0
CHEST TIGHTNESS: 1

## 2022-09-19 NOTE — PROGRESS NOTES
no distension. Neurological:      General: No focal deficit present. Mental Status: She is alert and oriented to person, place, and time. An electronic signature was used to authenticate this note.     --Tejinder Ellis MD

## 2022-09-27 DIAGNOSIS — E55.9 VITAMIN D DEFICIENCY: ICD-10-CM

## 2022-09-27 DIAGNOSIS — E55.9 VITAMIN D INSUFFICIENCY: ICD-10-CM

## 2022-09-27 DIAGNOSIS — E06.3 HASHIMOTO'S THYROIDITIS: ICD-10-CM

## 2022-09-27 DIAGNOSIS — E06.3 HASHIMOTO'S THYROIDITIS: Primary | ICD-10-CM

## 2022-09-27 DIAGNOSIS — D50.8 OTHER IRON DEFICIENCY ANEMIA: ICD-10-CM

## 2022-09-27 DIAGNOSIS — R73.01 IMPAIRED FASTING GLUCOSE: ICD-10-CM

## 2022-09-27 DIAGNOSIS — E78.2 MIXED HYPERLIPIDEMIA: ICD-10-CM

## 2022-09-27 DIAGNOSIS — Z00.01 ENCOUNTER FOR WELL ADULT EXAM WITH ABNORMAL FINDINGS: ICD-10-CM

## 2022-09-27 LAB
ALBUMIN SERPL-MCNC: 4.7 G/DL (ref 3.5–5.2)
ALP BLD-CCNC: 80 U/L (ref 35–104)
ALT SERPL-CCNC: 18 U/L (ref 0–32)
ANION GAP SERPL CALCULATED.3IONS-SCNC: 13 MMOL/L (ref 7–16)
ANISOCYTOSIS: ABNORMAL
AST SERPL-CCNC: 24 U/L (ref 0–31)
BASOPHILS ABSOLUTE: 0 E9/L (ref 0–0.2)
BASOPHILS RELATIVE PERCENT: 0.9 % (ref 0–2)
BILIRUB SERPL-MCNC: 0.3 MG/DL (ref 0–1.2)
BILIRUBIN URINE: NEGATIVE
BLOOD, URINE: NEGATIVE
BUN BLDV-MCNC: 8 MG/DL (ref 6–20)
CALCIUM SERPL-MCNC: 9.5 MG/DL (ref 8.6–10.2)
CHLORIDE BLD-SCNC: 101 MMOL/L (ref 98–107)
CHOLESTEROL, TOTAL: 197 MG/DL (ref 0–199)
CLARITY: CLEAR
CO2: 24 MMOL/L (ref 22–29)
COLOR: YELLOW
CREAT SERPL-MCNC: 0.8 MG/DL (ref 0.5–1)
EOSINOPHILS ABSOLUTE: 0.47 E9/L (ref 0.05–0.5)
EOSINOPHILS RELATIVE PERCENT: 5.2 % (ref 0–6)
FERRITIN: 6 NG/ML
GFR AFRICAN AMERICAN: >60
GFR NON-AFRICAN AMERICAN: >60 ML/MIN/1.73
GLUCOSE BLD-MCNC: 90 MG/DL (ref 74–99)
GLUCOSE URINE: NEGATIVE MG/DL
HBA1C MFR BLD: 5 % (ref 4–5.6)
HCT VFR BLD CALC: 35.7 % (ref 34–48)
HDLC SERPL-MCNC: 38 MG/DL
HEMOGLOBIN: 10.2 G/DL (ref 11.5–15.5)
IRON SATURATION: 7 % (ref 15–50)
IRON: 27 MCG/DL (ref 37–145)
KETONES, URINE: NEGATIVE MG/DL
LDL CHOLESTEROL CALCULATED: 126 MG/DL (ref 0–99)
LEUKOCYTE ESTERASE, URINE: NEGATIVE
LYMPHOCYTES ABSOLUTE: 1.73 E9/L (ref 1.5–4)
LYMPHOCYTES RELATIVE PERCENT: 19.1 % (ref 20–42)
MCH RBC QN AUTO: 21.3 PG (ref 26–35)
MCHC RBC AUTO-ENTMCNC: 28.6 % (ref 32–34.5)
MCV RBC AUTO: 74.5 FL (ref 80–99.9)
MONOCYTES ABSOLUTE: 0.27 E9/L (ref 0.1–0.95)
MONOCYTES RELATIVE PERCENT: 2.6 % (ref 2–12)
NEUTROPHILS ABSOLUTE: 6.64 E9/L (ref 1.8–7.3)
NEUTROPHILS RELATIVE PERCENT: 73.1 % (ref 43–80)
NITRITE, URINE: NEGATIVE
OVALOCYTES: ABNORMAL
PDW BLD-RTO: 16.6 FL (ref 11.5–15)
PH UA: 6 (ref 5–9)
PLATELET # BLD: 406 E9/L (ref 130–450)
PMV BLD AUTO: 10 FL (ref 7–12)
POIKILOCYTES: ABNORMAL
POLYCHROMASIA: ABNORMAL
POTASSIUM SERPL-SCNC: 4.2 MMOL/L (ref 3.5–5)
PROTEIN UA: NEGATIVE MG/DL
RBC # BLD: 4.79 E12/L (ref 3.5–5.5)
SODIUM BLD-SCNC: 138 MMOL/L (ref 132–146)
SPECIFIC GRAVITY UA: 1.02 (ref 1–1.03)
T3 FREE: 3.3 PG/ML (ref 2–4.4)
T4 FREE: 1.71 NG/DL (ref 0.93–1.7)
TOTAL IRON BINDING CAPACITY: 403 MCG/DL (ref 250–450)
TOTAL PROTEIN: 8.4 G/DL (ref 6.4–8.3)
TRIGL SERPL-MCNC: 165 MG/DL (ref 0–149)
TSH SERPL DL<=0.05 MIU/L-ACNC: 51.66 UIU/ML (ref 0.27–4.2)
UROBILINOGEN, URINE: 0.2 E.U./DL
VITAMIN D 25-HYDROXY: 16 NG/ML (ref 30–100)
VLDLC SERPL CALC-MCNC: 33 MG/DL
WBC # BLD: 9.1 E9/L (ref 4.5–11.5)

## 2022-09-27 RX ORDER — LIOTHYRONINE SODIUM 5 UG/1
10 TABLET ORAL DAILY
Qty: 180 TABLET | Refills: 1 | Status: SHIPPED | OUTPATIENT
Start: 2022-09-27 | End: 2022-12-26

## 2022-09-27 RX ORDER — DOXYCYCLINE HYCLATE 50 MG/1
324 CAPSULE, GELATIN COATED ORAL
Qty: 180 TABLET | Refills: 1 | Status: SHIPPED | OUTPATIENT
Start: 2022-09-27

## 2022-09-27 RX ORDER — CHOLECALCIFEROL (VITAMIN D3) 125 MCG
1 CAPSULE ORAL DAILY
Qty: 90 CAPSULE | Refills: 1 | Status: SHIPPED | OUTPATIENT
Start: 2022-09-27

## 2022-09-27 RX ORDER — LEVOTHYROXINE SODIUM 0.15 MG/1
150 TABLET ORAL DAILY
Qty: 90 TABLET | Refills: 1 | Status: SHIPPED | OUTPATIENT
Start: 2022-09-27

## 2022-09-29 LAB — THYROID PEROXIDASE (TPO) ABS: 722 IU/ML (ref 0–25)

## 2022-11-07 ENCOUNTER — OFFICE VISIT (OUTPATIENT)
Dept: FAMILY MEDICINE CLINIC | Age: 32
End: 2022-11-07
Payer: COMMERCIAL

## 2022-11-07 VITALS
SYSTOLIC BLOOD PRESSURE: 118 MMHG | WEIGHT: 206 LBS | OXYGEN SATURATION: 98 % | RESPIRATION RATE: 16 BRPM | DIASTOLIC BLOOD PRESSURE: 78 MMHG | HEIGHT: 60 IN | BODY MASS INDEX: 40.44 KG/M2 | HEART RATE: 115 BPM | TEMPERATURE: 98.9 F

## 2022-11-07 DIAGNOSIS — B96.89 ACUTE BACTERIAL SINUSITIS: ICD-10-CM

## 2022-11-07 DIAGNOSIS — R05.1 ACUTE COUGH: Primary | ICD-10-CM

## 2022-11-07 DIAGNOSIS — J01.90 ACUTE BACTERIAL SINUSITIS: ICD-10-CM

## 2022-11-07 LAB
INFLUENZA A ANTIBODY: NORMAL
INFLUENZA B ANTIBODY: NORMAL
Lab: NORMAL
PERFORMING INSTRUMENT: NORMAL
QC PASS/FAIL: NORMAL
SARS-COV-2, POC: NORMAL

## 2022-11-07 PROCEDURE — 87804 INFLUENZA ASSAY W/OPTIC: CPT | Performed by: STUDENT IN AN ORGANIZED HEALTH CARE EDUCATION/TRAINING PROGRAM

## 2022-11-07 PROCEDURE — 99213 OFFICE O/P EST LOW 20 MIN: CPT | Performed by: STUDENT IN AN ORGANIZED HEALTH CARE EDUCATION/TRAINING PROGRAM

## 2022-11-07 PROCEDURE — 87426 SARSCOV CORONAVIRUS AG IA: CPT | Performed by: STUDENT IN AN ORGANIZED HEALTH CARE EDUCATION/TRAINING PROGRAM

## 2022-11-07 RX ORDER — DOXYCYCLINE HYCLATE 100 MG
100 TABLET ORAL 2 TIMES DAILY
Qty: 20 TABLET | Refills: 0 | Status: SHIPPED | OUTPATIENT
Start: 2022-11-07 | End: 2022-11-17

## 2022-11-07 ASSESSMENT — ENCOUNTER SYMPTOMS
SORE THROAT: 1
COUGH: 1
ABDOMINAL PAIN: 0
NAUSEA: 0
SINUS PRESSURE: 1
SHORTNESS OF BREATH: 0
RHINORRHEA: 1
VOMITING: 0

## 2022-11-07 NOTE — PROGRESS NOTES
Guero Clemente (:  1990) is a 27 y.o. female,Established patient, here for evaluation of the following chief complaint(s):  Shortness of Breath, Cough (Started tuesday), Congestion (Tested herself at home,neg), Headache, and Generalized Body Aches         ASSESSMENT/PLAN:  1. Acute cough  -     POCT Influenza A/B  -     POCT COVID-19, Antigen  2. Acute bacterial sinusitis  -     doxycycline hyclate (VIBRA-TABS) 100 MG tablet; Take 1 tablet by mouth 2 times daily for 10 days, Disp-20 tablet, R-0Normal  Negative COVID and RSV  Given symptoms and length will treat for bacterial sinusitis. Augmentin allergy, will use doxy. Discussed return and ER precautions. Patient and or parent verbalized understanding    Return if symptoms worsen or fail to improve. Subjective   SUBJECTIVE/OBJECTIVE:  Congestion, sore throat, losing voice  -body aches  -no fever-has felt warm  -taking some mucinex at home  -has a sore throat  -no ear pain  -Has been going on about 6 days      Review of Systems   Constitutional:  Positive for chills. Negative for fever. HENT:  Positive for congestion, rhinorrhea, sinus pressure and sore throat. Negative for ear pain. Respiratory:  Positive for cough. Negative for shortness of breath. Cardiovascular:  Negative for chest pain and leg swelling. Gastrointestinal:  Negative for abdominal pain, nausea and vomiting. Genitourinary:  Negative for dysuria and hematuria. Musculoskeletal:  Positive for arthralgias and myalgias. Skin:  Negative for rash and wound. Neurological:  Negative for dizziness and light-headedness. Objective   Physical Exam  Vitals reviewed. Constitutional:       General: She is not in acute distress. Appearance: She is ill-appearing. HENT:      Head: Normocephalic and atraumatic. Right Ear: Tympanic membrane normal.      Left Ear: Tympanic membrane normal.      Nose: Congestion present.    Eyes:      Extraocular Movements: Extraocular movements intact. Conjunctiva/sclera: Conjunctivae normal.   Cardiovascular:      Rate and Rhythm: Normal rate and regular rhythm. Pulmonary:      Effort: Pulmonary effort is normal.      Breath sounds: Normal breath sounds. No wheezing. Neurological:      General: No focal deficit present. Mental Status: She is alert and oriented to person, place, and time. An electronic signature was used to authenticate this note.     --Pretty Loredo MD

## 2022-11-07 NOTE — Clinical Note
[place] [address]  Phone: [phone]  Fax: [phone]    Scott Cheney MD    November 7, 2022     Araseli Angulo DO  [address]     Patient: Abby Enriquez   MR Number: [id number]   YOB: 1990   Date of Visit: 11/7/2022       Dear Araseli Angulo: Thank you for referring Abby Enriquez to me for evaluation/treatment. Below are the relevant portions of my assessment and plan of care. ASSESSMENT/PLAN:  {There are no diagnoses linked to this encounter. (Refresh or delete this SmartLink)}    No follow-ups on file. If you have questions, please do not hesitate to call me. I look forward to following Whitley Rodas along with you.     Sincerely,      Scott Cheney MD

## 2022-11-07 NOTE — LETTER
[address] [address]  Phone: [phone]  Fax: [phone]    Silas Garcia MD        November 7, 2022     Patient: Fernandez Mejia   YOB: 1990   Date of Visit: 11/7/2022       To Whom It May Concern:    Please excuse Fernandez Mejia from work on 11/8/22        Sincerely,        Silas Garcia MD

## 2022-11-07 NOTE — LETTER
[place] [id number]  Phone: [phone]  Fax: [phone]    Natalia Peterson MD        November 7, 2022     Patient: Reji Miranda   YOB: 1990   Date of Visit: 11/7/2022       To Whom It May Concern:    Please excuse Reji Miranda from work on 11/7/22.         Sincerely,        Natalia Peterson MD

## 2022-11-08 DIAGNOSIS — E06.3 HASHIMOTO'S THYROIDITIS: ICD-10-CM

## 2022-11-08 RX ORDER — LEVOTHYROXINE SODIUM 0.15 MG/1
150 TABLET ORAL DAILY
Qty: 90 TABLET | Refills: 1 | Status: SHIPPED | OUTPATIENT
Start: 2022-11-08

## 2022-11-24 DIAGNOSIS — D50.8 OTHER IRON DEFICIENCY ANEMIA: ICD-10-CM

## 2022-11-24 DIAGNOSIS — F41.1 GAD (GENERALIZED ANXIETY DISORDER): ICD-10-CM

## 2022-11-24 DIAGNOSIS — F33.1 MODERATE EPISODE OF RECURRENT MAJOR DEPRESSIVE DISORDER (HCC): ICD-10-CM

## 2022-11-25 RX ORDER — BUPROPION HYDROCHLORIDE 150 MG/1
TABLET ORAL
Qty: 90 TABLET | Refills: 1 | Status: SHIPPED | OUTPATIENT
Start: 2022-11-25

## 2022-11-25 RX ORDER — DESVENLAFAXINE 50 MG/1
TABLET, EXTENDED RELEASE ORAL
Qty: 90 TABLET | Refills: 1 | Status: SHIPPED | OUTPATIENT
Start: 2022-11-25

## 2022-11-25 RX ORDER — DOXYCYCLINE HYCLATE 50 MG/1
CAPSULE, GELATIN COATED ORAL
Qty: 90 TABLET | Refills: 1 | Status: SHIPPED | OUTPATIENT
Start: 2022-11-25

## 2023-01-26 ENCOUNTER — TELEPHONE (OUTPATIENT)
Dept: PRIMARY CARE CLINIC | Age: 33
End: 2023-01-26

## 2023-01-26 RX ORDER — CEFDINIR 300 MG/1
300 CAPSULE ORAL 2 TIMES DAILY
Qty: 14 CAPSULE | Refills: 0 | Status: SHIPPED | OUTPATIENT
Start: 2023-01-26 | End: 2023-02-02

## 2023-01-26 RX ORDER — PREDNISONE 10 MG/1
TABLET ORAL
Qty: 30 TABLET | Refills: 0 | Status: SHIPPED | OUTPATIENT
Start: 2023-01-26 | End: 2023-02-07

## 2023-01-26 RX ORDER — DOXYCYCLINE HYCLATE 100 MG
100 TABLET ORAL 2 TIMES DAILY
Qty: 14 TABLET | Refills: 0 | Status: SHIPPED | OUTPATIENT
Start: 2023-01-26 | End: 2023-02-02

## 2023-01-26 NOTE — TELEPHONE ENCOUNTER
Pharmacy calling asking if okay to give cefdinir with the cefzil allergy. Or would you like to change to something else.

## 2023-02-02 SDOH — ECONOMIC STABILITY: FOOD INSECURITY: WITHIN THE PAST 12 MONTHS, THE FOOD YOU BOUGHT JUST DIDN'T LAST AND YOU DIDN'T HAVE MONEY TO GET MORE.: NEVER TRUE

## 2023-02-02 SDOH — ECONOMIC STABILITY: FOOD INSECURITY: WITHIN THE PAST 12 MONTHS, YOU WORRIED THAT YOUR FOOD WOULD RUN OUT BEFORE YOU GOT MONEY TO BUY MORE.: NEVER TRUE

## 2023-02-02 SDOH — ECONOMIC STABILITY: TRANSPORTATION INSECURITY
IN THE PAST 12 MONTHS, HAS LACK OF TRANSPORTATION KEPT YOU FROM MEETINGS, WORK, OR FROM GETTING THINGS NEEDED FOR DAILY LIVING?: NO

## 2023-02-02 SDOH — ECONOMIC STABILITY: INCOME INSECURITY: HOW HARD IS IT FOR YOU TO PAY FOR THE VERY BASICS LIKE FOOD, HOUSING, MEDICAL CARE, AND HEATING?: NOT HARD AT ALL

## 2023-02-02 SDOH — ECONOMIC STABILITY: HOUSING INSECURITY
IN THE LAST 12 MONTHS, WAS THERE A TIME WHEN YOU DID NOT HAVE A STEADY PLACE TO SLEEP OR SLEPT IN A SHELTER (INCLUDING NOW)?: NO

## 2023-02-03 ENCOUNTER — OFFICE VISIT (OUTPATIENT)
Dept: FAMILY MEDICINE CLINIC | Age: 33
End: 2023-02-03
Payer: COMMERCIAL

## 2023-02-03 VITALS
HEIGHT: 60 IN | BODY MASS INDEX: 39.82 KG/M2 | WEIGHT: 202.8 LBS | DIASTOLIC BLOOD PRESSURE: 74 MMHG | SYSTOLIC BLOOD PRESSURE: 120 MMHG | HEART RATE: 95 BPM | TEMPERATURE: 99.3 F | RESPIRATION RATE: 16 BRPM | OXYGEN SATURATION: 98 %

## 2023-02-03 DIAGNOSIS — E06.3 HASHIMOTO'S THYROIDITIS: Primary | ICD-10-CM

## 2023-02-03 DIAGNOSIS — E78.2 MIXED HYPERLIPIDEMIA: ICD-10-CM

## 2023-02-03 DIAGNOSIS — R73.01 IMPAIRED FASTING GLUCOSE: ICD-10-CM

## 2023-02-03 DIAGNOSIS — E55.9 VITAMIN D DEFICIENCY: ICD-10-CM

## 2023-02-03 DIAGNOSIS — F33.1 MODERATE EPISODE OF RECURRENT MAJOR DEPRESSIVE DISORDER (HCC): ICD-10-CM

## 2023-02-03 DIAGNOSIS — F41.1 GAD (GENERALIZED ANXIETY DISORDER): ICD-10-CM

## 2023-02-03 DIAGNOSIS — D50.8 OTHER IRON DEFICIENCY ANEMIA: ICD-10-CM

## 2023-02-03 PROCEDURE — 99214 OFFICE O/P EST MOD 30 MIN: CPT | Performed by: FAMILY MEDICINE

## 2023-02-03 RX ORDER — CIPROFLOXACIN AND DEXAMETHASONE 3; 1 MG/ML; MG/ML
4 SUSPENSION/ DROPS AURICULAR (OTIC) 2 TIMES DAILY
Qty: 7.5 ML | Refills: 0 | Status: SHIPPED | OUTPATIENT
Start: 2023-02-03 | End: 2023-02-10

## 2023-02-03 RX ORDER — BUPROPION HYDROCHLORIDE 150 MG/1
150 TABLET ORAL EVERY MORNING
Qty: 90 TABLET | Refills: 1 | Status: SHIPPED | OUTPATIENT
Start: 2023-02-03

## 2023-02-03 RX ORDER — CHOLECALCIFEROL (VITAMIN D3) 125 MCG
1 CAPSULE ORAL DAILY
Qty: 90 CAPSULE | Refills: 1 | Status: SHIPPED | OUTPATIENT
Start: 2023-02-03

## 2023-02-03 RX ORDER — DOXYCYCLINE HYCLATE 50 MG/1
324 CAPSULE, GELATIN COATED ORAL
Qty: 90 TABLET | Refills: 1 | Status: SHIPPED | OUTPATIENT
Start: 2023-02-03

## 2023-02-03 RX ORDER — LIOTHYRONINE SODIUM 5 UG/1
10 TABLET ORAL DAILY
Qty: 180 TABLET | Refills: 1 | Status: SHIPPED | OUTPATIENT
Start: 2023-02-03 | End: 2023-05-04

## 2023-02-03 RX ORDER — LEVOTHYROXINE SODIUM 0.15 MG/1
150 TABLET ORAL DAILY
Qty: 90 TABLET | Refills: 1 | Status: SHIPPED | OUTPATIENT
Start: 2023-02-03

## 2023-02-03 RX ORDER — LEVOTHYROXINE SODIUM 0.15 MG/1
150 TABLET ORAL DAILY
Qty: 90 TABLET | Refills: 1 | Status: CANCELLED | OUTPATIENT
Start: 2023-02-03

## 2023-02-03 RX ORDER — DESVENLAFAXINE 50 MG/1
50 TABLET, EXTENDED RELEASE ORAL DAILY
Qty: 90 TABLET | Refills: 1 | Status: SHIPPED | OUTPATIENT
Start: 2023-02-03

## 2023-02-03 ASSESSMENT — ANXIETY QUESTIONNAIRES
3. WORRYING TOO MUCH ABOUT DIFFERENT THINGS: 0
4. TROUBLE RELAXING: 0
6. BECOMING EASILY ANNOYED OR IRRITABLE: 2
7. FEELING AFRAID AS IF SOMETHING AWFUL MIGHT HAPPEN: 0
2. NOT BEING ABLE TO STOP OR CONTROL WORRYING: 1
GAD7 TOTAL SCORE: 4
1. FEELING NERVOUS, ANXIOUS, OR ON EDGE: 1
5. BEING SO RESTLESS THAT IT IS HARD TO SIT STILL: 0

## 2023-02-03 ASSESSMENT — PATIENT HEALTH QUESTIONNAIRE - PHQ9
4. FEELING TIRED OR HAVING LITTLE ENERGY: 1
3. TROUBLE FALLING OR STAYING ASLEEP: 0
7. TROUBLE CONCENTRATING ON THINGS, SUCH AS READING THE NEWSPAPER OR WATCHING TELEVISION: 0
8. MOVING OR SPEAKING SO SLOWLY THAT OTHER PEOPLE COULD HAVE NOTICED. OR THE OPPOSITE, BEING SO FIGETY OR RESTLESS THAT YOU HAVE BEEN MOVING AROUND A LOT MORE THAN USUAL: 0
9. THOUGHTS THAT YOU WOULD BE BETTER OFF DEAD, OR OF HURTING YOURSELF: 0
4. FEELING TIRED OR HAVING LITTLE ENERGY: 1
SUM OF ALL RESPONSES TO PHQ QUESTIONS 1-9: 1
5. POOR APPETITE OR OVEREATING: 0
7. TROUBLE CONCENTRATING ON THINGS, SUCH AS READING THE NEWSPAPER OR WATCHING TELEVISION: 0
SUM OF ALL RESPONSES TO PHQ QUESTIONS 1-9: 2
6. FEELING BAD ABOUT YOURSELF - OR THAT YOU ARE A FAILURE OR HAVE LET YOURSELF OR YOUR FAMILY DOWN: 0
SUM OF ALL RESPONSES TO PHQ QUESTIONS 1-9: 2
SUM OF ALL RESPONSES TO PHQ QUESTIONS 1-9: 1
8. MOVING OR SPEAKING SO SLOWLY THAT OTHER PEOPLE COULD HAVE NOTICED. OR THE OPPOSITE, BEING SO FIGETY OR RESTLESS THAT YOU HAVE BEEN MOVING AROUND A LOT MORE THAN USUAL: 0
9. THOUGHTS THAT YOU WOULD BE BETTER OFF DEAD, OR OF HURTING YOURSELF: 0
2. FEELING DOWN, DEPRESSED OR HOPELESS: 1
SUM OF ALL RESPONSES TO PHQ QUESTIONS 1-9: 1
6. FEELING BAD ABOUT YOURSELF - OR THAT YOU ARE A FAILURE OR HAVE LET YOURSELF OR YOUR FAMILY DOWN: 0
5. POOR APPETITE OR OVEREATING: 0
10. IF YOU CHECKED OFF ANY PROBLEMS, HOW DIFFICULT HAVE THESE PROBLEMS MADE IT FOR YOU TO DO YOUR WORK, TAKE CARE OF THINGS AT HOME, OR GET ALONG WITH OTHER PEOPLE: 0
SUM OF ALL RESPONSES TO PHQ QUESTIONS 1-9: 2
3. TROUBLE FALLING OR STAYING ASLEEP: 0
SUM OF ALL RESPONSES TO PHQ QUESTIONS 1-9: 2
SUM OF ALL RESPONSES TO PHQ QUESTIONS 1-9: 1

## 2023-02-03 ASSESSMENT — ENCOUNTER SYMPTOMS
DIARRHEA: 0
CONSTIPATION: 0
ABDOMINAL PAIN: 0
COUGH: 0
SHORTNESS OF BREATH: 0
SINUS PRESSURE: 0
VOMITING: 0
EYE DISCHARGE: 0
SORE THROAT: 0
NAUSEA: 0
RHINORRHEA: 0
WHEEZING: 0
BACK PAIN: 0
SINUS PAIN: 0

## 2023-02-03 NOTE — PROGRESS NOTES
2/3/23  Kush Blanco : 1990 Sex: female  Age: 34 y.o. Chief Complaint   Patient presents with    Anxiety     HPI:  34 y.o. female presents today for 6 month follow up of chronic medical conditions, medication refills and FBW. Patient's chart, medical, surgical and medication history all reviewed. Anxiety  Patient complains of evaluation of anxiety disorder and panic attacks. She has the following anxiety symptoms: difficulty concentrating, feelings of losing control, racing thoughts. Onset of symptoms was approximately several months ago, stable since that time. She denies current suicidal and homicidal ideation. Possible organic causes contributing are: endocrine/metabolic. Risk factors: previous episode of depression. Previous treatment includes BuSpar, Wellbutrin, Zoloft and Pristiq, Vistaril  and individual therapy. She complains of the following side effects from the treatment: none. Depression  Patient complains of depression. She complains of anhedonia, depressed mood, difficulty concentrating, fatigue, feelings of worthlessness/guilt, insomnia, psychomotor retardation and suicidal thoughts without plan. Onset was approximately several weeks ago, stable since that time. She denies current suicidal and homicidal plan or intent. Previous treatment includes  wellbutrin, Zoloft  and individual therapy. She complains of the following side effects from the treatment:  increased anxiety with Wellbutrin . PHQ-9 Total Score: 1 (2/3/2023  8:22 AM)  Thoughts that you would be better off dead, or of hurting yourself in some way: 0 (2/3/2023  8:22 AM)    Hypothyroidism  Patient presents for routine follow up of Hypothyroidism. Current symptoms: change in energy level and nervousness. Patient denies diarrhea, heat / cold intolerance, palpitations and weight changes. Symptoms have progressed to a point and plateaued. No difficulty swallowing or masses felt.   Last TSH was 51.660 (Sept 4008)---82.469 (2021)---29.640 (2020)---126.70 (2020)--35.600 (2020)--10.3 (2020). TPO was 722. Patient is currently taking Levothyroxine 150 mcg and Cytomel 10 mcg. ROS:  Review of Systems   Constitutional:  Positive for fatigue. Negative for appetite change, chills and fever. HENT:  Positive for congestion. Negative for ear pain, postnasal drip, rhinorrhea, sinus pressure, sinus pain and sore throat. Eyes:  Negative for discharge. Respiratory:  Negative for cough, shortness of breath and wheezing. Cardiovascular:  Negative for chest pain and palpitations. Gastrointestinal:  Negative for abdominal pain, constipation, diarrhea, nausea and vomiting. Musculoskeletal:  Negative for arthralgias and back pain. Skin:  Negative for rash. Neurological:  Negative for dizziness and headaches. Hematological:  Negative for adenopathy. Psychiatric/Behavioral:  Positive for dysphoric mood, sleep disturbance and suicidal ideas. The patient is nervous/anxious. All other systems reviewed and are negative. Current Outpatient Medications on File Prior to Visit   Medication Sig Dispense Refill    predniSONE (DELTASONE) 10 MG tablet Take 4 tablets by mouth daily for 3 days, THEN 3 tablets daily for 3 days, THEN 2 tablets daily for 3 days, THEN 1 tablet daily for 3 days. 30 tablet 0     No current facility-administered medications on file prior to visit.        Allergies   Allergen Reactions    Avelox [Moxifloxacin] Swelling    Cefzil [Cefprozil] Swelling    Augmentin [Amoxicillin-Pot Clavulanate] Nausea And Vomiting     Also non narcotic pain medication       Past Medical History:   Diagnosis Date    Abnormal Pap smear of cervix     repeat without issue    Anemia     Depression     Gestational diabetes     Took metformin    Hypothyroidism      Past Surgical History:   Procedure Laterality Date     SECTION N/A 2019     SECTION performed by Mamie Barron Santo Lutz DO at [facility]    TYMPANOSTOMY TUBE PLACEMENT      WISDOM TOOTH EXTRACTION  2008     Family History   Problem Relation Age of Onset    Diabetes Father     Hypothyroidism Father     Rheum Arthritis Mother     Breast Cancer Paternal Aunt         Social History     Socioeconomic History    Marital status:      Spouse name: Not on file    Number of children: Not on file    Years of education: Not on file    Highest education level: Not on file   Occupational History    Not on file   Tobacco Use    Smoking status: Never    Smokeless tobacco: Never   Vaping Use    Vaping Use: Never used   Substance and Sexual Activity    Alcohol use: Not Currently    Drug use: Never    Sexual activity: Yes     Partners: Male   Other Topics Concern    Not on file   Social History Narrative    Not on file     Social Determinants of Health     Financial Resource Strain: Low Risk     Difficulty of Paying Living Expenses: Not hard at all   Food Insecurity: No Food Insecurity    Worried About Running Out of Food in the Last Year: Never true    Ran Out of Food in the Last Year: Never true   Transportation Needs: Not on file   Physical Activity: Not on file   Stress: Not on file   Social Connections: Not on file   Intimate Partner Violence: Not on file   Housing Stability: Not on file       Vitals:    02/03/23 0819   BP: 120/74   Pulse: 95   Resp: 16   Temp: 99.3 °F (37.4 °C)   TempSrc: Temporal   SpO2: 98%   Weight: 202 lb 12.8 oz (92 kg)   Height: 5' (1.524 m)       Physical Exam:  Physical Exam  Vitals and nursing note reviewed. Constitutional:       General: She is not in acute distress. Appearance: Normal appearance. She is well-developed. She is obese. She is not ill-appearing. HENT:      Head: Normocephalic and atraumatic. Right Ear: Hearing, tympanic membrane, ear canal and external ear normal. There is no impacted cerumen.       Left Ear: Hearing, tympanic membrane, ear canal and external ear normal. There is no impacted cerumen. Nose: Congestion present. No mucosal edema or rhinorrhea. Right Sinus: No maxillary sinus tenderness or frontal sinus tenderness. Left Sinus: No maxillary sinus tenderness or frontal sinus tenderness. Comments: Wearing mask     Mouth/Throat:      Mouth: Mucous membranes are moist.      Pharynx: Posterior oropharyngeal erythema present. No oropharyngeal exudate. Eyes:      General: Lids are normal. No scleral icterus. Right eye: No discharge. Left eye: No discharge. Extraocular Movements: Extraocular movements intact. Conjunctiva/sclera: Conjunctivae normal.   Neck:      Thyroid: Thyromegaly present. No thyroid mass or thyroid tenderness. Cardiovascular:      Rate and Rhythm: Normal rate and regular rhythm. Heart sounds: Normal heart sounds. No murmur heard. Pulmonary:      Effort: Pulmonary effort is normal. No respiratory distress. Breath sounds: Normal breath sounds. No wheezing. Musculoskeletal:         General: No tenderness or deformity. Normal range of motion. Cervical back: Normal range of motion and neck supple. No tenderness. Right lower leg: No edema. Left lower leg: No edema. Lymphadenopathy:      Cervical: No cervical adenopathy. Skin:     General: Skin is warm and dry. Findings: No rash. Neurological:      General: No focal deficit present. Mental Status: She is alert and oriented to person, place, and time. Gait: Gait normal.   Psychiatric:         Mood and Affect: Mood and affect normal.         Speech: Speech normal.         Behavior: Behavior normal.         Thought Content:  Thought content normal.       Labs:  CBC with Differential:    Lab Results   Component Value Date/Time    WBC 9.1 09/27/2022 07:59 AM    RBC 4.79 09/27/2022 07:59 AM    HGB 10.2 09/27/2022 07:59 AM    HCT 35.7 09/27/2022 07:59 AM     09/27/2022 07:59 AM    MCV 74.5 09/27/2022 07:59 AM    MCH 21.3 09/27/2022 07:59 AM    MCHC 28.6 09/27/2022 07:59 AM    RDW 16.6 09/27/2022 07:59 AM    LYMPHOPCT 19.1 09/27/2022 07:59 AM    MONOPCT 2.6 09/27/2022 07:59 AM    BASOPCT 0.9 09/27/2022 07:59 AM    MONOSABS 0.27 09/27/2022 07:59 AM    LYMPHSABS 1.73 09/27/2022 07:59 AM    EOSABS 0.47 09/27/2022 07:59 AM    BASOSABS 0.00 09/27/2022 07:59 AM     CMP:    Lab Results   Component Value Date/Time     09/27/2022 07:59 AM    K 4.2 09/27/2022 07:59 AM     09/27/2022 07:59 AM    CO2 24 09/27/2022 07:59 AM    BUN 8 09/27/2022 07:59 AM    CREATININE 0.8 09/27/2022 07:59 AM    GFRAA >60 09/27/2022 07:59 AM    LABGLOM >60 09/27/2022 07:59 AM    GLUCOSE 90 09/27/2022 07:59 AM    PROT 8.4 09/27/2022 07:59 AM    LABALBU 4.7 09/27/2022 07:59 AM    CALCIUM 9.5 09/27/2022 07:59 AM    BILITOT 0.3 09/27/2022 07:59 AM    ALKPHOS 80 09/27/2022 07:59 AM    AST 24 09/27/2022 07:59 AM    ALT 18 09/27/2022 07:59 AM     HgBA1c:    Lab Results   Component Value Date/Time    LABA1C 5.0 09/27/2022 07:59 AM     FLP:    Lab Results   Component Value Date/Time    TRIG 165 09/27/2022 07:59 AM    HDL 38 09/27/2022 07:59 AM    LDLCALC 126 09/27/2022 07:59 AM    LABVLDL 33 09/27/2022 07:59 AM     TSH:    Lab Results   Component Value Date/Time    TSH 51.660 09/27/2022 07:59 AM          Assessment and Plan:  Keon Das was seen today for anxiety. Diagnoses and all orders for this visit:    Hashimoto's thyroiditis  -     levothyroxine (SYNTHROID) 150 MCG tablet; Take 1 tablet by mouth Daily  -     liothyronine (CYTOMEL) 5 MCG tablet; Take 2 tablets by mouth daily  -     CBC with Auto Differential; Future  -     Comprehensive Metabolic Panel; Future  -     TSH; Future  -     Urinalysis; Future  -     T4, Free; Future  -     T3, Free; Future  -     Thyroid Peroxidase Antibody; Future  Due for repeat labs since starting Cytomel. Feeling more fatigued, but shifts have been changing frequently at work.   Discussed possibility of ALICE with snoring, but will start with labs. Mixed hyperlipidemia  -     Lipid Panel; Future    NORIS (generalized anxiety disorder)  -     desvenlafaxine succinate (PRISTIQ) 50 MG TB24 extended release tablet; Take 1 tablet by mouth daily  Stable    Moderate episode of recurrent major depressive disorder (HCC)  -     buPROPion (WELLBUTRIN XL) 150 MG extended release tablet; Take 1 tablet by mouth every morning    Other iron deficiency anemia  -     ferrous gluconate (FERGON) 324 (38 Fe) MG tablet; Take 1 tablet by mouth daily (with breakfast)  -     Iron and TIBC; Future  -     Ferritin; Future    Impaired fasting glucose  -     Hemoglobin A1C; Future    Vitamin D deficiency  -     Cholecalciferol (VITAMIN D) 125 MCG (5000 UT) CAPS; Take 1 capsule by mouth daily  -     Vitamin D 25 Hydroxy; Future      Return in about 6 months (around 8/3/2023), or if symptoms worsen or fail to improve, for Well visit.       Seen By:  Meagan Tracey DO

## 2023-03-10 DIAGNOSIS — G43.109 MIGRAINE WITH AURA AND WITHOUT STATUS MIGRAINOSUS, NOT INTRACTABLE: Primary | ICD-10-CM

## 2023-03-10 RX ORDER — SUMATRIPTAN 25 MG/1
25 TABLET, FILM COATED ORAL
Qty: 9 TABLET | Refills: 5 | Status: SHIPPED | OUTPATIENT
Start: 2023-03-10 | End: 2023-03-10

## 2023-03-10 RX ORDER — KETOROLAC TROMETHAMINE 30 MG/ML
60 INJECTION, SOLUTION INTRAMUSCULAR; INTRAVENOUS ONCE
Status: SHIPPED | OUTPATIENT
Start: 2023-03-10 | End: 2023-03-15

## 2023-03-24 ENCOUNTER — HOSPITAL ENCOUNTER (OUTPATIENT)
Dept: MRI IMAGING | Age: 33
Discharge: HOME | End: 2023-03-24
Payer: COMMERCIAL

## 2023-03-24 DIAGNOSIS — G43.109 MIGRAINE WITH AURA AND WITHOUT STATUS MIGRAINOSUS, NOT INTRACTABLE: ICD-10-CM

## 2023-03-24 PROCEDURE — 70551 MRI BRAIN STEM W/O DYE: CPT

## 2023-05-05 ENCOUNTER — OFFICE VISIT (OUTPATIENT)
Dept: FAMILY MEDICINE CLINIC | Age: 33
End: 2023-05-05
Payer: COMMERCIAL

## 2023-05-05 VITALS
DIASTOLIC BLOOD PRESSURE: 74 MMHG | HEART RATE: 110 BPM | SYSTOLIC BLOOD PRESSURE: 122 MMHG | RESPIRATION RATE: 16 BRPM | TEMPERATURE: 97.1 F | HEIGHT: 60 IN | WEIGHT: 202 LBS | BODY MASS INDEX: 39.66 KG/M2 | OXYGEN SATURATION: 98 %

## 2023-05-05 DIAGNOSIS — E66.01 SEVERE OBESITY (BMI 35.0-39.9) WITH COMORBIDITY (HCC): ICD-10-CM

## 2023-05-05 DIAGNOSIS — E06.3 HASHIMOTO'S THYROIDITIS: Primary | ICD-10-CM

## 2023-05-05 DIAGNOSIS — F33.1 MODERATE EPISODE OF RECURRENT MAJOR DEPRESSIVE DISORDER (HCC): ICD-10-CM

## 2023-05-05 DIAGNOSIS — D50.8 OTHER IRON DEFICIENCY ANEMIA: ICD-10-CM

## 2023-05-05 DIAGNOSIS — G43.709 CHRONIC MIGRAINE WITHOUT AURA WITHOUT STATUS MIGRAINOSUS, NOT INTRACTABLE: ICD-10-CM

## 2023-05-05 DIAGNOSIS — F41.1 GAD (GENERALIZED ANXIETY DISORDER): ICD-10-CM

## 2023-05-05 DIAGNOSIS — E78.2 MIXED HYPERLIPIDEMIA: ICD-10-CM

## 2023-05-05 PROBLEM — F41.9 ANXIETY: Status: RESOLVED | Noted: 2020-05-07 | Resolved: 2023-05-05

## 2023-05-05 PROBLEM — D64.9 ABSOLUTE ANEMIA: Status: ACTIVE | Noted: 2023-05-05

## 2023-05-05 PROCEDURE — 99214 OFFICE O/P EST MOD 30 MIN: CPT | Performed by: FAMILY MEDICINE

## 2023-05-05 RX ORDER — RIZATRIPTAN BENZOATE 10 MG/1
10 TABLET ORAL
Qty: 9 TABLET | Refills: 5 | Status: SHIPPED | OUTPATIENT
Start: 2023-05-05 | End: 2023-05-05

## 2023-05-05 ASSESSMENT — ENCOUNTER SYMPTOMS
CONSTIPATION: 0
SHORTNESS OF BREATH: 0
VOMITING: 1
NAUSEA: 0
WHEEZING: 0
COUGH: 0
DIARRHEA: 0
BACK PAIN: 0
ABDOMINAL PAIN: 0

## 2023-05-05 NOTE — PROGRESS NOTES
23  Al Marquez : 1990 Sex: female  Age: 34 y.o. Chief Complaint   Patient presents with    Anxiety     HPI:  34 y.o. female presents today for 3 month follow up of chronic medical conditions, medication refills and FBW. Patient's chart, medical, surgical and medication history all reviewed. Anxiety  Patient complains of evaluation of anxiety disorder and panic attacks. She has the following anxiety symptoms: difficulty concentrating, feelings of losing control, racing thoughts. Onset of symptoms was approximately several months ago, stable since that time. She denies current suicidal and homicidal ideation. Possible organic causes contributing are: endocrine/metabolic. Risk factors: previous episode of depression. Previous treatment includes BuSpar, Wellbutrin, Zoloft and Pristiq, Vistaril  and individual therapy. She complains of the following side effects from the treatment: none. Depression  Patient complains of depression. She complains of anhedonia, depressed mood, difficulty concentrating, fatigue, feelings of worthlessness/guilt, insomnia, psychomotor retardation and suicidal thoughts without plan. Onset was approximately several weeks ago, stable since that time. She denies current suicidal and homicidal plan or intent. Previous treatment includes  wellbutrin, Zoloft  and individual therapy. She complains of the following side effects from the treatment:  increased anxiety with Wellbutrin . Hypothyroidism  Patient presents for routine follow up of Hypothyroidism. Current symptoms: change in energy level and nervousness. Patient denies diarrhea, heat / cold intolerance, palpitations and weight changes. Symptoms have progressed to a point and plateaued. No difficulty swallowing or masses felt. Last TSH was 51.660 (2022)---71.920 (2021)---29.640 (2020)---126.70 (2020)--35.600 (2020)--10.3 (2020). TPO was 722.   Patient is currently

## 2023-06-23 DIAGNOSIS — E06.3 HASHIMOTO'S THYROIDITIS: ICD-10-CM

## 2023-06-23 LAB
AMORPH SED URNS QL MICRO: PRESENT
BACTERIA URNS QL MICRO: ABNORMAL /HPF
BILIRUB UR QL STRIP: NEGATIVE
CLARITY UR: ABNORMAL
COLOR UR: YELLOW
GLUCOSE UR STRIP-MCNC: NEGATIVE MG/DL
HGB UR QL STRIP: ABNORMAL
KETONES UR STRIP-MCNC: NEGATIVE MG/DL
LEUKOCYTE ESTERASE UR QL STRIP: NEGATIVE
NITRITE UR QL STRIP: NEGATIVE
PH UR STRIP: 6.5 [PH] (ref 5–9)
PROT UR STRIP-MCNC: ABNORMAL MG/DL
RBC #/AREA URNS HPF: ABNORMAL /HPF (ref 0–2)
SP GR UR STRIP: >=1.03 (ref 1–1.03)
UROBILINOGEN UR STRIP-ACNC: 1 E.U./DL
WBC #/AREA URNS HPF: ABNORMAL /HPF (ref 0–5)

## 2023-06-24 ENCOUNTER — TELEPHONE (OUTPATIENT)
Dept: FAMILY MEDICINE CLINIC | Age: 33
End: 2023-06-24

## 2023-06-24 RX ORDER — NITROFURANTOIN 25; 75 MG/1; MG/1
100 CAPSULE ORAL 2 TIMES DAILY
Qty: 10 CAPSULE | Refills: 0 | Status: SHIPPED | OUTPATIENT
Start: 2023-06-24 | End: 2023-06-29

## 2023-08-11 ENCOUNTER — OFFICE VISIT (OUTPATIENT)
Dept: FAMILY MEDICINE CLINIC | Age: 33
End: 2023-08-11
Payer: COMMERCIAL

## 2023-08-11 VITALS
RESPIRATION RATE: 18 BRPM | SYSTOLIC BLOOD PRESSURE: 120 MMHG | HEIGHT: 60 IN | DIASTOLIC BLOOD PRESSURE: 78 MMHG | OXYGEN SATURATION: 99 % | BODY MASS INDEX: 39.66 KG/M2 | WEIGHT: 202 LBS | TEMPERATURE: 97.5 F | HEART RATE: 109 BPM

## 2023-08-11 DIAGNOSIS — F41.1 GAD (GENERALIZED ANXIETY DISORDER): ICD-10-CM

## 2023-08-11 DIAGNOSIS — G43.709 CHRONIC MIGRAINE WITHOUT AURA WITHOUT STATUS MIGRAINOSUS, NOT INTRACTABLE: ICD-10-CM

## 2023-08-11 DIAGNOSIS — F33.1 MODERATE EPISODE OF RECURRENT MAJOR DEPRESSIVE DISORDER (HCC): ICD-10-CM

## 2023-08-11 DIAGNOSIS — E06.3 HASHIMOTO'S THYROIDITIS: ICD-10-CM

## 2023-08-11 DIAGNOSIS — D50.8 OTHER IRON DEFICIENCY ANEMIA: ICD-10-CM

## 2023-08-11 DIAGNOSIS — E55.9 VITAMIN D DEFICIENCY: ICD-10-CM

## 2023-08-11 DIAGNOSIS — Z00.01 ENCOUNTER FOR WELL ADULT EXAM WITH ABNORMAL FINDINGS: Primary | ICD-10-CM

## 2023-08-11 PROCEDURE — 99395 PREV VISIT EST AGE 18-39: CPT | Performed by: FAMILY MEDICINE

## 2023-08-11 RX ORDER — CHOLECALCIFEROL (VITAMIN D3) 125 MCG
1 CAPSULE ORAL DAILY
Qty: 90 CAPSULE | Refills: 1 | Status: SHIPPED | OUTPATIENT
Start: 2023-08-11

## 2023-08-11 RX ORDER — LIOTHYRONINE SODIUM 5 UG/1
10 TABLET ORAL DAILY
Qty: 180 TABLET | Refills: 1 | Status: SHIPPED | OUTPATIENT
Start: 2023-08-11 | End: 2023-11-09

## 2023-08-11 RX ORDER — DOXYCYCLINE HYCLATE 50 MG/1
324 CAPSULE, GELATIN COATED ORAL
Qty: 90 TABLET | Refills: 1 | Status: SHIPPED | OUTPATIENT
Start: 2023-08-11

## 2023-08-11 RX ORDER — DESVENLAFAXINE SUCCINATE 50 MG/1
50 TABLET, EXTENDED RELEASE ORAL DAILY
Qty: 90 TABLET | Refills: 1 | Status: SHIPPED | OUTPATIENT
Start: 2023-08-11

## 2023-08-11 RX ORDER — LEVOTHYROXINE SODIUM 0.15 MG/1
150 TABLET ORAL DAILY
Qty: 90 TABLET | Refills: 1 | Status: SHIPPED | OUTPATIENT
Start: 2023-08-11

## 2023-08-11 RX ORDER — BUPROPION HYDROCHLORIDE 150 MG/1
150 TABLET ORAL EVERY MORNING
Qty: 90 TABLET | Refills: 1 | Status: SHIPPED | OUTPATIENT
Start: 2023-08-11

## 2023-08-11 ASSESSMENT — ENCOUNTER SYMPTOMS
COUGH: 0
CONSTIPATION: 0
WHEEZING: 0
BACK PAIN: 0
VOMITING: 0
SHORTNESS OF BREATH: 0
ABDOMINAL PAIN: 0
NAUSEA: 0
DIARRHEA: 0

## 2023-08-11 NOTE — PROGRESS NOTES
23  Roberto Yuli : 1990 Sex: female  Age: 34 y.o. Chief Complaint   Patient presents with    Annual Exam     Be well Physical     HPI:  34 y.o. female presents today for annual physical.  Patient's chart, medical, surgical and medication history all reviewed. Well Adult Physical  Patient here for a physical exam.  The patient reports problems-  continues to have migraine headaches, but mildly improved. Having side effects with triptan medications. Do you take any herbs or supplements that were not prescribed by a doctor? no   Are you taking calcium supplements? no   Are you taking aspirin daily? no    Colonoscopy: No prior colonoscopy  Dental visit: Within last 6 mos  Vision check:  No Problems  PAP:  2022 with Dr. Nadine Reeves    Last time routine bloodwork was done:  2022    Immunization status: up to date and documented. Smoking status: never    Physical activity:  infrequently      ROS:  Review of Systems   Constitutional:  Negative for chills, fatigue and fever. Respiratory:  Negative for cough, shortness of breath and wheezing. Cardiovascular:  Negative for chest pain and palpitations. Gastrointestinal:  Negative for abdominal pain, constipation, diarrhea, nausea and vomiting. Musculoskeletal:  Negative for arthralgias and back pain. Skin:  Negative for rash. Neurological:  Positive for headaches. Negative for dizziness. Psychiatric/Behavioral:  Positive for dysphoric mood. The patient is nervous/anxious. All other systems reviewed and are negative. Current Outpatient Medications on File Prior to Visit   Medication Sig Dispense Refill    rizatriptan (MAXALT) 10 MG tablet Take 1 tablet by mouth once as needed for Migraine May repeat in 2 hours if needed 9 tablet 5     No current facility-administered medications on file prior to visit.        Allergies   Allergen Reactions    Avelox [Moxifloxacin] Swelling    Cefzil [Cefprozil] Swelling    Augmentin

## 2023-09-26 DIAGNOSIS — R73.01 IMPAIRED FASTING GLUCOSE: ICD-10-CM

## 2023-09-26 DIAGNOSIS — D50.8 OTHER IRON DEFICIENCY ANEMIA: ICD-10-CM

## 2023-09-26 DIAGNOSIS — E55.9 VITAMIN D DEFICIENCY: ICD-10-CM

## 2023-09-26 DIAGNOSIS — E78.2 MIXED HYPERLIPIDEMIA: ICD-10-CM

## 2023-09-26 DIAGNOSIS — E06.3 HASHIMOTO'S THYROIDITIS: ICD-10-CM

## 2023-09-26 LAB
ABSOLUTE IMMATURE GRANULOCYTE: <0.03 K/UL (ref 0–0.58)
ALBUMIN SERPL-MCNC: 4.5 G/DL (ref 3.5–5.2)
ALP BLD-CCNC: 74 U/L (ref 35–104)
ALT SERPL-CCNC: 10 U/L (ref 0–32)
ANION GAP SERPL CALCULATED.3IONS-SCNC: 14 MMOL/L (ref 7–16)
AST SERPL-CCNC: 21 U/L (ref 0–31)
BASOPHILS ABSOLUTE: 0.05 K/UL (ref 0–0.2)
BASOPHILS RELATIVE PERCENT: 1 % (ref 0–2)
BILIRUB SERPL-MCNC: 0.3 MG/DL (ref 0–1.2)
BUN BLDV-MCNC: 7 MG/DL (ref 6–20)
CALCIUM SERPL-MCNC: 9.1 MG/DL (ref 8.6–10.2)
CHLORIDE BLD-SCNC: 104 MMOL/L (ref 98–107)
CHOLESTEROL: 176 MG/DL
CO2: 21 MMOL/L (ref 22–29)
CREAT SERPL-MCNC: 0.8 MG/DL (ref 0.5–1)
EOSINOPHILS ABSOLUTE: 0.21 K/UL (ref 0.05–0.5)
EOSINOPHILS RELATIVE PERCENT: 3 % (ref 0–6)
FERRITIN: 10 NG/ML
GFR SERPL CREATININE-BSD FRML MDRD: >60 ML/MIN/1.73M2
GLUCOSE BLD-MCNC: 90 MG/DL (ref 74–99)
HBA1C MFR BLD: 5 % (ref 4–5.6)
HCT VFR BLD CALC: 31 % (ref 34–48)
HDLC SERPL-MCNC: 36 MG/DL
HEMOGLOBIN: 8.9 G/DL (ref 11.5–15.5)
IMMATURE GRANULOCYTES: 0 % (ref 0–5)
IRON SATURATION: 5 % (ref 15–50)
IRON: 20 UG/DL (ref 37–145)
LDL CHOLESTEROL: 116 MG/DL
LYMPHOCYTES ABSOLUTE: 2.12 K/UL (ref 1.5–4)
LYMPHOCYTES RELATIVE PERCENT: 26 % (ref 20–42)
MCH RBC QN AUTO: 20.2 PG (ref 26–35)
MCHC RBC AUTO-ENTMCNC: 28.7 G/DL (ref 32–34.5)
MCV RBC AUTO: 70.5 FL (ref 80–99.9)
MONOCYTES ABSOLUTE: 0.37 K/UL (ref 0.1–0.95)
MONOCYTES RELATIVE PERCENT: 5 % (ref 2–12)
NEUTROPHILS ABSOLUTE: 5.31 K/UL (ref 1.8–7.3)
NEUTROPHILS RELATIVE PERCENT: 66 % (ref 43–80)
PDW BLD-RTO: 17 % (ref 11.5–15)
PLATELET # BLD: 457 K/UL (ref 130–450)
PMV BLD AUTO: 9.8 FL (ref 7–12)
POTASSIUM SERPL-SCNC: 4.5 MMOL/L (ref 3.5–5)
RBC # BLD: 4.4 M/UL (ref 3.5–5.5)
RBC # BLD: ABNORMAL 10*6/UL
SODIUM BLD-SCNC: 139 MMOL/L (ref 132–146)
T3 FREE: 2.41 PG/ML (ref 2–4.4)
T4 FREE: 0.7 NG/DL (ref 0.9–1.7)
TOTAL IRON BINDING CAPACITY: 426 UG/DL (ref 250–450)
TOTAL PROTEIN: 7.5 G/DL (ref 6.4–8.3)
TRIGL SERPL-MCNC: 122 MG/DL
TSH SERPL DL<=0.05 MIU/L-ACNC: 80.3 UIU/ML (ref 0.27–4.2)
VITAMIN D 25-HYDROXY: 17.5 NG/ML (ref 30–100)
VLDLC SERPL CALC-MCNC: 24 MG/DL
WBC # BLD: 8.1 K/UL (ref 4.5–11.5)

## 2023-10-02 LAB — THYROID PEROXIDASE (TPO) AB: 794 IU/ML (ref 0–25)

## 2023-10-05 DIAGNOSIS — E55.9 VITAMIN D DEFICIENCY: ICD-10-CM

## 2023-10-05 RX ORDER — CHOLECALCIFEROL TAB 125 MCG (5000 UNIT) 125 MCG (5000 UT)
1 TAB DAILY
Qty: 90 TABLET | Refills: 1 | OUTPATIENT
Start: 2023-10-05

## 2023-10-12 ENCOUNTER — OFFICE VISIT (OUTPATIENT)
Dept: FAMILY MEDICINE CLINIC | Age: 33
End: 2023-10-12
Payer: COMMERCIAL

## 2023-10-12 VITALS
OXYGEN SATURATION: 100 % | RESPIRATION RATE: 18 BRPM | BODY MASS INDEX: 39.66 KG/M2 | TEMPERATURE: 98.8 F | DIASTOLIC BLOOD PRESSURE: 80 MMHG | WEIGHT: 202 LBS | HEIGHT: 60 IN | SYSTOLIC BLOOD PRESSURE: 124 MMHG | HEART RATE: 120 BPM

## 2023-10-12 DIAGNOSIS — G43.909 MIGRAINE WITHOUT STATUS MIGRAINOSUS, NOT INTRACTABLE, UNSPECIFIED MIGRAINE TYPE: ICD-10-CM

## 2023-10-12 DIAGNOSIS — J02.9 PHARYNGOTONSILLITIS: Primary | ICD-10-CM

## 2023-10-12 DIAGNOSIS — J03.90 PHARYNGOTONSILLITIS: Primary | ICD-10-CM

## 2023-10-12 LAB
Lab: NORMAL
PERFORMING INSTRUMENT: NORMAL
QC PASS/FAIL: NORMAL
S PYO AG THROAT QL: NORMAL
SARS-COV-2, POC: NORMAL

## 2023-10-12 PROCEDURE — 96372 THER/PROPH/DIAG INJ SC/IM: CPT | Performed by: PHYSICIAN ASSISTANT

## 2023-10-12 PROCEDURE — 99213 OFFICE O/P EST LOW 20 MIN: CPT | Performed by: PHYSICIAN ASSISTANT

## 2023-10-12 PROCEDURE — 87426 SARSCOV CORONAVIRUS AG IA: CPT | Performed by: PHYSICIAN ASSISTANT

## 2023-10-12 PROCEDURE — 87880 STREP A ASSAY W/OPTIC: CPT | Performed by: PHYSICIAN ASSISTANT

## 2023-10-12 RX ORDER — KETOROLAC TROMETHAMINE 30 MG/ML
60 INJECTION, SOLUTION INTRAMUSCULAR; INTRAVENOUS ONCE
Status: COMPLETED | OUTPATIENT
Start: 2023-10-12 | End: 2023-10-12

## 2023-10-12 RX ORDER — AZITHROMYCIN 250 MG/1
TABLET, FILM COATED ORAL
Qty: 6 TABLET | Refills: 0 | Status: SHIPPED | OUTPATIENT
Start: 2023-10-12

## 2023-10-12 RX ADMIN — KETOROLAC TROMETHAMINE 60 MG: 30 INJECTION, SOLUTION INTRAMUSCULAR; INTRAVENOUS at 08:55

## 2023-10-12 NOTE — PROGRESS NOTES
Chief Complaint   Pharyngitis (Onset of symptoms began yesterday ), Congestion, Drainage (Taking Nurtec for migraine), and Chills      History of Present Illness   Source of history provided by: patient. Tracy Box is a 34 y.o. old female presenting to the walk in clinic for evaluation of a migraine headache which has been present since last night. Headache is located over the left temporal region. Reports associated photophobia and phonophobia. Denies any nausea or vomiting episodes. Reports a longstanding history of migraines in the past. Pt tried taking Nurtec without relief. Denies this being the worst HA of his/her life. Reports associated nasal congestion, chills, and sore throat. Pt denies any recent falls, trauma, dizziness, syncope, CP, SOB, palpitations, visual loss, unilateral weakness, fever, or neck stiffness. ROS    Unless otherwise stated in this report or unable to obtain because of the patient's clinical or mental status as evidenced by the medical record, this patients's positive and negative responses for Review of Systems, constitutional, psych, eyes, ENT, cardiovascular, respiratory, gastrointestinal, neurological, genitourinary, musculoskeletal, integument systems and systems related to the presenting problem are either stated in the preceding or were not pertinent or were negative for the symptoms and/or complaints related to the medical problem. Past Medical History:  has a past medical history of Abnormal Pap smear of cervix, Anemia, Depression, Gestational diabetes, and Hypothyroidism. Past Surgical History:  has a past surgical history that includes [place] tooth extraction (); Tympanostomy tube placement; and  section (N/A, 2019). Social History:  reports that she has never smoked. She has never used smokeless tobacco. She reports that she does not currently use alcohol. She reports that she does not use drugs.   Family History: family history includes

## 2023-10-20 DIAGNOSIS — D50.8 OTHER IRON DEFICIENCY ANEMIA: ICD-10-CM

## 2023-10-20 DIAGNOSIS — E06.3 HASHIMOTO'S THYROIDITIS: Primary | ICD-10-CM

## 2023-10-20 DIAGNOSIS — E55.9 VITAMIN D DEFICIENCY: ICD-10-CM

## 2023-10-27 ENCOUNTER — OFFICE VISIT (OUTPATIENT)
Dept: FAMILY MEDICINE CLINIC | Age: 33
End: 2023-10-27
Payer: COMMERCIAL

## 2023-10-27 VITALS
RESPIRATION RATE: 20 BRPM | TEMPERATURE: 98.2 F | DIASTOLIC BLOOD PRESSURE: 86 MMHG | WEIGHT: 202 LBS | SYSTOLIC BLOOD PRESSURE: 130 MMHG | HEART RATE: 111 BPM | HEIGHT: 60 IN | BODY MASS INDEX: 39.66 KG/M2 | OXYGEN SATURATION: 100 %

## 2023-10-27 DIAGNOSIS — F41.1 GAD (GENERALIZED ANXIETY DISORDER): Primary | ICD-10-CM

## 2023-10-27 PROCEDURE — 99213 OFFICE O/P EST LOW 20 MIN: CPT | Performed by: FAMILY MEDICINE

## 2023-10-27 RX ORDER — LORAZEPAM 0.5 MG/1
TABLET ORAL
Qty: 120 TABLET | Refills: 2 | Status: SHIPPED | OUTPATIENT
Start: 2023-10-27 | End: 2023-11-26

## 2023-11-08 ASSESSMENT — ENCOUNTER SYMPTOMS
CONSTIPATION: 0
COUGH: 0
DIARRHEA: 0
BACK PAIN: 0
ABDOMINAL PAIN: 0
NAUSEA: 0
WHEEZING: 0
VOMITING: 0
SHORTNESS OF BREATH: 0
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
## 2023-12-04 ENCOUNTER — TELEPHONE (OUTPATIENT)
Dept: PRIMARY CARE CLINIC | Age: 33
End: 2023-12-04

## 2024-01-09 DIAGNOSIS — E55.9 VITAMIN D DEFICIENCY: ICD-10-CM

## 2024-01-09 RX ORDER — CHOLECALCIFEROL TAB 125 MCG (5000 UNIT) 125 MCG (5000 UT)
1 TAB DAILY
Qty: 90 TABLET | Refills: 1 | Status: SHIPPED | OUTPATIENT
Start: 2024-01-09

## 2024-02-08 DIAGNOSIS — G43.709 CHRONIC MIGRAINE WITHOUT AURA WITHOUT STATUS MIGRAINOSUS, NOT INTRACTABLE: ICD-10-CM

## 2024-02-23 ENCOUNTER — OFFICE VISIT (OUTPATIENT)
Dept: FAMILY MEDICINE CLINIC | Age: 34
End: 2024-02-23
Payer: COMMERCIAL

## 2024-02-23 VITALS
DIASTOLIC BLOOD PRESSURE: 82 MMHG | HEART RATE: 85 BPM | TEMPERATURE: 97.4 F | WEIGHT: 202 LBS | BODY MASS INDEX: 39.66 KG/M2 | OXYGEN SATURATION: 100 % | SYSTOLIC BLOOD PRESSURE: 120 MMHG | HEIGHT: 60 IN

## 2024-02-23 DIAGNOSIS — F33.1 MODERATE EPISODE OF RECURRENT MAJOR DEPRESSIVE DISORDER (HCC): ICD-10-CM

## 2024-02-23 DIAGNOSIS — F41.1 GAD (GENERALIZED ANXIETY DISORDER): Primary | ICD-10-CM

## 2024-02-23 DIAGNOSIS — D50.8 OTHER IRON DEFICIENCY ANEMIA: ICD-10-CM

## 2024-02-23 DIAGNOSIS — E66.01 SEVERE OBESITY (BMI 35.0-39.9) WITH COMORBIDITY (HCC): ICD-10-CM

## 2024-02-23 DIAGNOSIS — G43.709 CHRONIC MIGRAINE WITHOUT AURA WITHOUT STATUS MIGRAINOSUS, NOT INTRACTABLE: ICD-10-CM

## 2024-02-23 DIAGNOSIS — E06.3 HASHIMOTO'S THYROIDITIS: ICD-10-CM

## 2024-02-23 PROCEDURE — 99214 OFFICE O/P EST MOD 30 MIN: CPT | Performed by: FAMILY MEDICINE

## 2024-02-23 RX ORDER — LEVOTHYROXINE SODIUM 0.15 MG/1
150 TABLET ORAL DAILY
Qty: 90 TABLET | Refills: 1 | Status: SHIPPED | OUTPATIENT
Start: 2024-02-23

## 2024-02-23 RX ORDER — BUPROPION HYDROCHLORIDE 150 MG/1
150 TABLET ORAL EVERY MORNING
Qty: 90 TABLET | Refills: 1 | Status: SHIPPED | OUTPATIENT
Start: 2024-02-23

## 2024-02-23 RX ORDER — LIOTHYRONINE SODIUM 5 UG/1
10 TABLET ORAL DAILY
Qty: 180 TABLET | Refills: 1 | Status: SHIPPED | OUTPATIENT
Start: 2024-02-23 | End: 2024-05-23

## 2024-02-23 RX ORDER — DESVENLAFAXINE SUCCINATE 50 MG/1
50 TABLET, EXTENDED RELEASE ORAL DAILY
Qty: 90 TABLET | Refills: 1 | Status: SHIPPED | OUTPATIENT
Start: 2024-02-23

## 2024-02-23 RX ORDER — FERROUS GLUCONATE 324(38)MG
324 TABLET ORAL
Qty: 90 TABLET | Refills: 1 | Status: SHIPPED | OUTPATIENT
Start: 2024-02-23

## 2024-02-23 ASSESSMENT — ENCOUNTER SYMPTOMS
COUGH: 0
VOMITING: 0
DIARRHEA: 0
BACK PAIN: 0
SHORTNESS OF BREATH: 0
NAUSEA: 0
WHEEZING: 0
CONSTIPATION: 0
ABDOMINAL PAIN: 0

## 2024-02-23 ASSESSMENT — PATIENT HEALTH QUESTIONNAIRE - PHQ9
2. FEELING DOWN, DEPRESSED OR HOPELESS: 3
4. FEELING TIRED OR HAVING LITTLE ENERGY: 3
7. TROUBLE CONCENTRATING ON THINGS, SUCH AS READING THE NEWSPAPER OR WATCHING TELEVISION: 2
SUM OF ALL RESPONSES TO PHQ QUESTIONS 1-9: 14
5. POOR APPETITE OR OVEREATING: 2
SUM OF ALL RESPONSES TO PHQ QUESTIONS 1-9: 14
3. TROUBLE FALLING OR STAYING ASLEEP: 2
SUM OF ALL RESPONSES TO PHQ QUESTIONS 1-9: 14
SUM OF ALL RESPONSES TO PHQ9 QUESTIONS 1 & 2: 5
1. LITTLE INTEREST OR PLEASURE IN DOING THINGS: 2
SUM OF ALL RESPONSES TO PHQ QUESTIONS 1-9: 14
9. THOUGHTS THAT YOU WOULD BE BETTER OFF DEAD, OR OF HURTING YOURSELF: 0
10. IF YOU CHECKED OFF ANY PROBLEMS, HOW DIFFICULT HAVE THESE PROBLEMS MADE IT FOR YOU TO DO YOUR WORK, TAKE CARE OF THINGS AT HOME, OR GET ALONG WITH OTHER PEOPLE: 2
6. FEELING BAD ABOUT YOURSELF - OR THAT YOU ARE A FAILURE OR HAVE LET YOURSELF OR YOUR FAMILY DOWN: 0
8. MOVING OR SPEAKING SO SLOWLY THAT OTHER PEOPLE COULD HAVE NOTICED. OR THE OPPOSITE, BEING SO FIGETY OR RESTLESS THAT YOU HAVE BEEN MOVING AROUND A LOT MORE THAN USUAL: 0
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
## 2024-03-21 ENCOUNTER — OFFICE VISIT (OUTPATIENT)
Dept: FAMILY MEDICINE CLINIC | Age: 34
End: 2024-03-21
Payer: COMMERCIAL

## 2024-03-21 ENCOUNTER — TELEPHONE (OUTPATIENT)
Dept: FAMILY MEDICINE CLINIC | Age: 34
End: 2024-03-21

## 2024-03-21 VITALS
TEMPERATURE: 98.5 F | BODY MASS INDEX: 39.27 KG/M2 | HEART RATE: 103 BPM | SYSTOLIC BLOOD PRESSURE: 118 MMHG | DIASTOLIC BLOOD PRESSURE: 78 MMHG | RESPIRATION RATE: 20 BRPM | WEIGHT: 200 LBS | OXYGEN SATURATION: 98 % | HEIGHT: 60 IN

## 2024-03-21 DIAGNOSIS — Z20.818 EXPOSURE TO STREP THROAT: ICD-10-CM

## 2024-03-21 DIAGNOSIS — J06.9 URI WITH COUGH AND CONGESTION: Primary | ICD-10-CM

## 2024-03-21 LAB — S PYO AG THROAT QL: NORMAL

## 2024-03-21 PROCEDURE — 99213 OFFICE O/P EST LOW 20 MIN: CPT | Performed by: PHYSICIAN ASSISTANT

## 2024-03-21 PROCEDURE — 87880 STREP A ASSAY W/OPTIC: CPT | Performed by: PHYSICIAN ASSISTANT

## 2024-03-21 RX ORDER — BENZONATATE 200 MG/1
200 CAPSULE ORAL 3 TIMES DAILY PRN
Qty: 15 CAPSULE | Refills: 0 | Status: SHIPPED | OUTPATIENT
Start: 2024-03-21

## 2024-03-21 RX ORDER — AZITHROMYCIN 250 MG/1
TABLET, FILM COATED ORAL
Qty: 6 TABLET | Refills: 0 | Status: SHIPPED | OUTPATIENT
Start: 2024-03-21

## 2024-03-21 NOTE — PROGRESS NOTES
Chief Complaint       Cough (Also exposed to strep )      History of Present Illness   Source of history provided by:  patient.    Leslie Schaefer is a 33 y.o. old female presenting to the walk in clinic for evaluation of a moist sounding cough, chest congestion, nasal congestion, and sore throat which has been present for the past 2 to 3 days.  Patient is primarily concerned because her son tested positive for strep in our office this morning. Denies any fever, chills, CP, dyspnea, LE edema, abdominal pain, vomiting, rash, or lethargy. Denies any hx of asthma.       ROS    Unless otherwise stated in this report or unable to obtain because of the patient's clinical or mental status as evidenced by the medical record, this patients's positive and negative responses for Review of Systems, constitutional, psych, eyes, ENT, cardiovascular, respiratory, gastrointestinal, neurological, genitourinary, musculoskeletal, integument systems and systems related to the presenting problem are either stated in the preceding or were not pertinent or were negative for the symptoms and/or complaints related to the medical problem.    Past Medical History:  has a past medical history of Abnormal Pap smear of cervix, Anemia, Depression, Gestational diabetes, and Hypothyroidism.  Past Surgical History:  has a past surgical history that includes [place] tooth extraction (); Tympanostomy tube placement; and  section (N/A, 2019).  Social History:  reports that she has never smoked. She has never used smokeless tobacco. She reports that she does not currently use alcohol. She reports that she does not use drugs.  Family History: family history includes Breast Cancer in her paternal aunt; Diabetes in her father; Hypothyroidism in her father; Rheum Arthritis in her mother.   Allergies: Avelox [moxifloxacin], Cefzil [cefprozil], and Augmentin [amoxicillin-pot clavulanate]    Physical Exam         VS:  /78   Pulse (!)

## 2024-03-28 RX ORDER — METHYLPREDNISOLONE 4 MG/1
TABLET ORAL
Qty: 1 KIT | Refills: 0 | Status: SHIPPED | OUTPATIENT
Start: 2024-03-28 | End: 2024-04-03

## 2024-05-04 PROCEDURE — 96374 THER/PROPH/DIAG INJ IV PUSH: CPT

## 2024-05-04 PROCEDURE — 99285 EMERGENCY DEPT VISIT HI MDM: CPT

## 2024-05-04 PROCEDURE — 93005 ELECTROCARDIOGRAM TRACING: CPT | Performed by: EMERGENCY MEDICINE

## 2024-05-04 ASSESSMENT — LIFESTYLE VARIABLES
HOW OFTEN DO YOU HAVE A DRINK CONTAINING ALCOHOL: 2-4 TIMES A MONTH
HOW MANY STANDARD DRINKS CONTAINING ALCOHOL DO YOU HAVE ON A TYPICAL DAY: 1 OR 2

## 2024-05-04 ASSESSMENT — PAIN SCALES - GENERAL: PAINLEVEL_OUTOF10: 8

## 2024-05-04 ASSESSMENT — PAIN DESCRIPTION - LOCATION: LOCATION: CHEST

## 2024-05-05 ENCOUNTER — APPOINTMENT (OUTPATIENT)
Dept: ULTRASOUND IMAGING | Age: 34
DRG: 812 | End: 2024-05-05
Payer: COMMERCIAL

## 2024-05-05 ENCOUNTER — HOSPITAL ENCOUNTER (INPATIENT)
Age: 34
LOS: 1 days | Discharge: HOME | DRG: 812 | End: 2024-05-06
Attending: EMERGENCY MEDICINE | Admitting: HOSPITALIST
Payer: COMMERCIAL

## 2024-05-05 ENCOUNTER — APPOINTMENT (OUTPATIENT)
Dept: GENERAL RADIOLOGY | Age: 34
DRG: 812 | End: 2024-05-05
Payer: COMMERCIAL

## 2024-05-05 DIAGNOSIS — D64.9 SYMPTOMATIC ANEMIA: Primary | ICD-10-CM

## 2024-05-05 DIAGNOSIS — E06.3 HASHIMOTO'S THYROIDITIS: ICD-10-CM

## 2024-05-05 PROBLEM — R07.9 CHEST PAIN WITH LOW RISK FOR CARDIAC ETIOLOGY: Status: ACTIVE | Noted: 2024-05-05

## 2024-05-05 LAB
ALBUMIN SERPL-MCNC: 4.4 G/DL (ref 3.5–5.2)
ALP SERPL-CCNC: 57 U/L (ref 35–104)
ALT SERPL-CCNC: 21 U/L (ref 0–32)
ANION GAP SERPL CALCULATED.3IONS-SCNC: 11 MMOL/L (ref 7–16)
AST SERPL-CCNC: 28 U/L (ref 0–31)
ATYPICAL LYMPHOCYTE ABSOLUTE COUNT: 0.05 K/UL (ref 0–0.46)
ATYPICAL LYMPHOCYTES: 1 % (ref 0–4)
BACTERIA URNS QL MICRO: ABNORMAL
BASOPHILS # BLD: 0 K/UL (ref 0–0.2)
BASOPHILS NFR BLD: 0 % (ref 0–2)
BILIRUB SERPL-MCNC: 0.3 MG/DL (ref 0–1.2)
BILIRUB UR QL STRIP: NEGATIVE
BUN SERPL-MCNC: 7 MG/DL (ref 6–20)
CALCIUM SERPL-MCNC: 9.3 MG/DL (ref 8.6–10.2)
CHLORIDE SERPL-SCNC: 103 MMOL/L (ref 98–107)
CLARITY UR: CLEAR
CO2 SERPL-SCNC: 26 MMOL/L (ref 22–29)
COLOR UR: YELLOW
CREAT SERPL-MCNC: 0.9 MG/DL (ref 0.5–1)
CRP SERPL HS-MCNC: 5 MG/L (ref 0–5)
CRYSTALS URNS MICRO: ABNORMAL /HPF
D DIMER: 1302 NG/ML DDU (ref 0–232)
EOSINOPHIL # BLD: 0.15 K/UL (ref 0.05–0.5)
EOSINOPHILS RELATIVE PERCENT: 3 % (ref 0–6)
EPI CELLS #/AREA URNS HPF: ABNORMAL /HPF
ERYTHROCYTE [DISTWIDTH] IN BLOOD BY AUTOMATED COUNT: 17.2 % (ref 11.5–15)
GFR, ESTIMATED: 84 ML/MIN/1.73M2
GLUCOSE SERPL-MCNC: 104 MG/DL (ref 74–99)
GLUCOSE UR STRIP-MCNC: NEGATIVE MG/DL
HAPTOGLOB SERPL-MCNC: 156 MG/DL (ref 30–200)
HCG, URINE, POC: NEGATIVE
HCT VFR BLD AUTO: 25 % (ref 34–48)
HGB BLD-MCNC: 7.2 G/DL (ref 11.5–15.5)
HGB UR QL STRIP.AUTO: ABNORMAL
IMM RETICS NFR: 6.1 % (ref 3–15.9)
INFLUENZA A BY PCR: NOT DETECTED
INFLUENZA B BY PCR: NOT DETECTED
INR PPP: 1.2
KETONES UR STRIP-MCNC: NEGATIVE MG/DL
LDH SERPL-CCNC: 340 U/L (ref 135–214)
LEUKOCYTE ESTERASE UR QL STRIP: NEGATIVE
LIPASE SERPL-CCNC: 22 U/L (ref 13–60)
LYMPHOCYTES NFR BLD: 0.98 K/UL (ref 1.5–4)
LYMPHOCYTES RELATIVE PERCENT: 17 % (ref 20–42)
Lab: NORMAL
MAGNESIUM SERPL-MCNC: 2 MG/DL (ref 1.6–2.6)
MCH RBC QN AUTO: 20.2 PG (ref 26–35)
MCHC RBC AUTO-ENTMCNC: 28.8 G/DL (ref 32–34.5)
MCV RBC AUTO: 70 FL (ref 80–99.9)
METAMYELOCYTES ABSOLUTE COUNT: 0.05 K/UL (ref 0–0.12)
METAMYELOCYTES: 1 % (ref 0–1)
MONOCYTES NFR BLD: 0.05 K/UL (ref 0.1–0.95)
MONOCYTES NFR BLD: 1 % (ref 2–12)
MUCOUS THREADS URNS QL MICRO: PRESENT
NEGATIVE QC PASS/FAIL: NORMAL
NEUTROPHILS NFR BLD: 78 % (ref 43–80)
NEUTS SEG NFR BLD: 4.52 K/UL (ref 1.8–7.3)
NITRITE UR QL STRIP: NEGATIVE
PH UR STRIP: 6 [PH] (ref 5–9)
PLATELET # BLD AUTO: 293 K/UL (ref 130–450)
PMV BLD AUTO: 9.9 FL (ref 7–12)
POSITIVE QC PASS/FAIL: NORMAL
POTASSIUM SERPL-SCNC: 3.5 MMOL/L (ref 3.5–5)
PROT SERPL-MCNC: 7.4 G/DL (ref 6.4–8.3)
PROT UR STRIP-MCNC: 30 MG/DL
PROTHROMBIN TIME: 12.7 SEC (ref 9.3–12.4)
RBC # BLD AUTO: 3.57 M/UL (ref 3.5–5.5)
RBC # BLD: ABNORMAL 10*6/UL
RBC #/AREA URNS HPF: ABNORMAL /HPF
RETIC HEMOGLOBIN: 23.1 PG (ref 28.2–36.6)
RETICS # AUTO: 0.01 M/UL
RETICS/RBC NFR AUTO: 0.2 % (ref 0.4–1.9)
SARS-COV-2 RDRP RESP QL NAA+PROBE: NOT DETECTED
SODIUM SERPL-SCNC: 140 MMOL/L (ref 132–146)
SP GR UR STRIP: 1.02 (ref 1–1.03)
SPECIMEN DESCRIPTION: NORMAL
T4 FREE SERPL-MCNC: 0.5 NG/DL (ref 0.9–1.7)
TRANSFERRIN SERPL-MCNC: 279 MG/DL (ref 200–360)
TROPONIN I SERPL HS-MCNC: <6 NG/L (ref 0–9)
TSH SERPL DL<=0.05 MIU/L-ACNC: 128.1 UIU/ML (ref 0.27–4.2)
UROBILINOGEN UR STRIP-ACNC: 0.2 EU/DL (ref 0–1)
WBC #/AREA URNS HPF: ABNORMAL /HPF
WBC OTHER # BLD: 5.8 K/UL (ref 4.5–11.5)

## 2024-05-05 PROCEDURE — 86901 BLOOD TYPING SEROLOGIC RH(D): CPT

## 2024-05-05 PROCEDURE — 84466 ASSAY OF TRANSFERRIN: CPT

## 2024-05-05 PROCEDURE — 36415 COLL VENOUS BLD VENIPUNCTURE: CPT

## 2024-05-05 PROCEDURE — 85379 FIBRIN DEGRADATION QUANT: CPT

## 2024-05-05 PROCEDURE — 86039 ANTINUCLEAR ANTIBODIES (ANA): CPT

## 2024-05-05 PROCEDURE — 85045 AUTOMATED RETICULOCYTE COUNT: CPT

## 2024-05-05 PROCEDURE — 6360000002 HC RX W HCPCS: Performed by: NURSE PRACTITIONER

## 2024-05-05 PROCEDURE — 80053 COMPREHEN METABOLIC PANEL: CPT

## 2024-05-05 PROCEDURE — 83010 ASSAY OF HAPTOGLOBIN QUANT: CPT

## 2024-05-05 PROCEDURE — C9113 INJ PANTOPRAZOLE SODIUM, VIA: HCPCS | Performed by: EMERGENCY MEDICINE

## 2024-05-05 PROCEDURE — 2580000003 HC RX 258: Performed by: NURSE PRACTITIONER

## 2024-05-05 PROCEDURE — C9113 INJ PANTOPRAZOLE SODIUM, VIA: HCPCS | Performed by: NURSE PRACTITIONER

## 2024-05-05 PROCEDURE — 87635 SARS-COV-2 COVID-19 AMP PRB: CPT

## 2024-05-05 PROCEDURE — 84439 ASSAY OF FREE THYROXINE: CPT

## 2024-05-05 PROCEDURE — 76856 US EXAM PELVIC COMPLETE: CPT

## 2024-05-05 PROCEDURE — 6370000000 HC RX 637 (ALT 250 FOR IP): Performed by: NURSE PRACTITIONER

## 2024-05-05 PROCEDURE — 86923 COMPATIBILITY TEST ELECTRIC: CPT

## 2024-05-05 PROCEDURE — 87502 INFLUENZA DNA AMP PROBE: CPT

## 2024-05-05 PROCEDURE — 86900 BLOOD TYPING SEROLOGIC ABO: CPT

## 2024-05-05 PROCEDURE — 85610 PROTHROMBIN TIME: CPT

## 2024-05-05 PROCEDURE — 71045 X-RAY EXAM CHEST 1 VIEW: CPT

## 2024-05-05 PROCEDURE — 81001 URINALYSIS AUTO W/SCOPE: CPT

## 2024-05-05 PROCEDURE — 83690 ASSAY OF LIPASE: CPT

## 2024-05-05 PROCEDURE — P9016 RBC LEUKOCYTES REDUCED: HCPCS

## 2024-05-05 PROCEDURE — 86140 C-REACTIVE PROTEIN: CPT

## 2024-05-05 PROCEDURE — 6360000002 HC RX W HCPCS: Performed by: EMERGENCY MEDICINE

## 2024-05-05 PROCEDURE — 86038 ANTINUCLEAR ANTIBODIES: CPT

## 2024-05-05 PROCEDURE — 84484 ASSAY OF TROPONIN QUANT: CPT

## 2024-05-05 PROCEDURE — 86850 RBC ANTIBODY SCREEN: CPT

## 2024-05-05 PROCEDURE — 85025 COMPLETE CBC W/AUTO DIFF WBC: CPT

## 2024-05-05 PROCEDURE — 83615 LACTATE (LD) (LDH) ENZYME: CPT

## 2024-05-05 PROCEDURE — 36430 TRANSFUSION BLD/BLD COMPNT: CPT

## 2024-05-05 PROCEDURE — 83735 ASSAY OF MAGNESIUM: CPT

## 2024-05-05 PROCEDURE — 2060000000 HC ICU INTERMEDIATE R&B

## 2024-05-05 PROCEDURE — 30233N1 TRANSFUSION OF NONAUTOLOGOUS RED BLOOD CELLS INTO PERIPHERAL VEIN, PERCUTANEOUS APPROACH: ICD-10-PCS | Performed by: HOSPITALIST

## 2024-05-05 PROCEDURE — 84443 ASSAY THYROID STIM HORMONE: CPT

## 2024-05-05 RX ORDER — BUPROPION HYDROCHLORIDE 150 MG/1
150 TABLET ORAL EVERY MORNING
Status: DISCONTINUED | OUTPATIENT
Start: 2024-05-05 | End: 2024-05-06 | Stop reason: HOSPADM

## 2024-05-05 RX ORDER — SENNOSIDES A AND B 8.6 MG/1
1 TABLET, FILM COATED ORAL DAILY PRN
Status: DISCONTINUED | OUTPATIENT
Start: 2024-05-05 | End: 2024-05-06 | Stop reason: HOSPADM

## 2024-05-05 RX ORDER — SODIUM CHLORIDE 0.9 % (FLUSH) 0.9 %
10 SYRINGE (ML) INJECTION EVERY 12 HOURS SCHEDULED
Status: DISCONTINUED | OUTPATIENT
Start: 2024-05-05 | End: 2024-05-06 | Stop reason: HOSPADM

## 2024-05-05 RX ORDER — LIOTHYRONINE SODIUM 5 UG/1
10 TABLET ORAL DAILY
Status: DISCONTINUED | OUTPATIENT
Start: 2024-05-05 | End: 2024-05-06 | Stop reason: HOSPADM

## 2024-05-05 RX ORDER — PANTOPRAZOLE SODIUM 40 MG/10ML
40 INJECTION, POWDER, LYOPHILIZED, FOR SOLUTION INTRAVENOUS ONCE
Status: COMPLETED | OUTPATIENT
Start: 2024-05-05 | End: 2024-05-05

## 2024-05-05 RX ORDER — MAGNESIUM SULFATE IN WATER 40 MG/ML
2000 INJECTION, SOLUTION INTRAVENOUS PRN
Status: DISCONTINUED | OUTPATIENT
Start: 2024-05-05 | End: 2024-05-06 | Stop reason: HOSPADM

## 2024-05-05 RX ORDER — SODIUM CHLORIDE 9 MG/ML
INJECTION, SOLUTION INTRAVENOUS PRN
Status: DISCONTINUED | OUTPATIENT
Start: 2024-05-05 | End: 2024-05-06 | Stop reason: HOSPADM

## 2024-05-05 RX ORDER — ACETAMINOPHEN 325 MG/1
650 TABLET ORAL EVERY 6 HOURS PRN
Status: DISCONTINUED | OUTPATIENT
Start: 2024-05-05 | End: 2024-05-06 | Stop reason: HOSPADM

## 2024-05-05 RX ORDER — VENLAFAXINE 25 MG/1
25 TABLET ORAL
Status: DISCONTINUED | OUTPATIENT
Start: 2024-05-05 | End: 2024-05-06 | Stop reason: HOSPADM

## 2024-05-05 RX ORDER — SODIUM CHLORIDE 0.9 % (FLUSH) 0.9 %
10 SYRINGE (ML) INJECTION PRN
Status: DISCONTINUED | OUTPATIENT
Start: 2024-05-05 | End: 2024-05-06 | Stop reason: HOSPADM

## 2024-05-05 RX ORDER — POTASSIUM CHLORIDE 20 MEQ/1
40 TABLET, EXTENDED RELEASE ORAL PRN
Status: DISCONTINUED | OUTPATIENT
Start: 2024-05-05 | End: 2024-05-06 | Stop reason: HOSPADM

## 2024-05-05 RX ORDER — ONDANSETRON 2 MG/ML
4 INJECTION INTRAMUSCULAR; INTRAVENOUS EVERY 6 HOURS PRN
Status: DISCONTINUED | OUTPATIENT
Start: 2024-05-05 | End: 2024-05-06 | Stop reason: HOSPADM

## 2024-05-05 RX ORDER — ACETAMINOPHEN 650 MG/1
650 SUPPOSITORY RECTAL EVERY 6 HOURS PRN
Status: DISCONTINUED | OUTPATIENT
Start: 2024-05-05 | End: 2024-05-06 | Stop reason: HOSPADM

## 2024-05-05 RX ORDER — POTASSIUM CHLORIDE 7.45 MG/ML
10 INJECTION INTRAVENOUS PRN
Status: DISCONTINUED | OUTPATIENT
Start: 2024-05-05 | End: 2024-05-06 | Stop reason: HOSPADM

## 2024-05-05 RX ORDER — BENZONATATE 100 MG/1
200 CAPSULE ORAL 3 TIMES DAILY PRN
Status: DISCONTINUED | OUTPATIENT
Start: 2024-05-05 | End: 2024-05-06 | Stop reason: HOSPADM

## 2024-05-05 RX ORDER — LORAZEPAM 0.5 MG/1
0.5 TABLET ORAL 2 TIMES DAILY PRN
COMMUNITY

## 2024-05-05 RX ORDER — PANTOPRAZOLE SODIUM 40 MG/10ML
40 INJECTION, POWDER, LYOPHILIZED, FOR SOLUTION INTRAVENOUS 2 TIMES DAILY
Status: DISCONTINUED | OUTPATIENT
Start: 2024-05-05 | End: 2024-05-06 | Stop reason: HOSPADM

## 2024-05-05 RX ORDER — LEVOTHYROXINE SODIUM 0.07 MG/1
150 TABLET ORAL DAILY
Status: DISCONTINUED | OUTPATIENT
Start: 2024-05-05 | End: 2024-05-06 | Stop reason: HOSPADM

## 2024-05-05 RX ORDER — ONDANSETRON 4 MG/1
4 TABLET, ORALLY DISINTEGRATING ORAL EVERY 8 HOURS PRN
Status: DISCONTINUED | OUTPATIENT
Start: 2024-05-05 | End: 2024-05-06 | Stop reason: HOSPADM

## 2024-05-05 RX ORDER — FERROUS GLUCONATE 324(38)MG
324 TABLET ORAL
Status: DISCONTINUED | OUTPATIENT
Start: 2024-05-05 | End: 2024-05-06 | Stop reason: HOSPADM

## 2024-05-05 RX ADMIN — VENLAFAXINE HYDROCHLORIDE 25 MG: 25 TABLET ORAL at 11:45

## 2024-05-05 RX ADMIN — SODIUM CHLORIDE, PRESERVATIVE FREE 10 ML: 5 INJECTION INTRAVENOUS at 07:55

## 2024-05-05 RX ADMIN — FERROUS GLUCONATE 324 MG: 324 TABLET ORAL at 07:52

## 2024-05-05 RX ADMIN — BUPROPION HYDROCHLORIDE 150 MG: 150 TABLET, EXTENDED RELEASE ORAL at 07:53

## 2024-05-05 RX ADMIN — LEVOTHYROXINE SODIUM 150 MCG: 75 TABLET ORAL at 07:43

## 2024-05-05 RX ADMIN — VENLAFAXINE HYDROCHLORIDE 25 MG: 25 TABLET ORAL at 07:51

## 2024-05-05 RX ADMIN — PANTOPRAZOLE SODIUM 40 MG: 40 INJECTION, POWDER, FOR SOLUTION INTRAVENOUS at 07:45

## 2024-05-05 RX ADMIN — PANTOPRAZOLE SODIUM 40 MG: 40 INJECTION, POWDER, FOR SOLUTION INTRAVENOUS at 20:15

## 2024-05-05 RX ADMIN — VENLAFAXINE HYDROCHLORIDE 25 MG: 25 TABLET ORAL at 16:35

## 2024-05-05 RX ADMIN — SODIUM CHLORIDE, PRESERVATIVE FREE 10 ML: 5 INJECTION INTRAVENOUS at 20:15

## 2024-05-05 RX ADMIN — LIOTHYRONINE SODIUM 10 MCG: 5 TABLET ORAL at 07:53

## 2024-05-05 RX ADMIN — SODIUM CHLORIDE, PRESERVATIVE FREE 10 ML: 5 INJECTION INTRAVENOUS at 15:51

## 2024-05-05 RX ADMIN — PANTOPRAZOLE SODIUM 40 MG: 40 INJECTION, POWDER, FOR SOLUTION INTRAVENOUS at 03:16

## 2024-05-05 ASSESSMENT — PAIN SCALES - GENERAL
PAINLEVEL_OUTOF10: 0
PAINLEVEL_OUTOF10: 0

## 2024-05-05 NOTE — PROGRESS NOTES
4 Eyes Skin Assessment     NAME:  Leslie Schaefer  YOB: 1990  MEDICAL RECORD NUMBER:  [id number]    The patient is being assessed for  Admission    I agree that at least one RN has performed a thorough Head to Toe Skin Assessment on the patient. ALL assessment sites listed below have been assessed.      Areas assessed by both nurses:    Head, Face, Ears, Shoulders, Back, Chest, Arms, Elbows, Hands, Sacrum. Buttock, Coccyx, Ischium, Legs. Feet and Heels, and Under Medical Devices         Does the Patient have a Wound? No noted wound(s)       Maurilio Prevention initiated by RN: No  Wound Care Orders initiated by RN: No    Pressure Injury (Stage 3,4, Unstageable, DTI, NWPT, and Complex wounds) if present, place Wound referral order by RN under : No    New Ostomies, if present place, Ostomy referral order under : No     Nurse 1 eSignature: Electronically signed by Genna Jacob RN on 5/5/24 at 6:37 AM EDT    **SHARE this note so that the co-signing nurse can place an eSignature**    Nurse 2 eSignature: Electronically signed by Latisha Juan RN on 5/5/24 at 6:40 AM EDT

## 2024-05-05 NOTE — CARE COORDINATION
Internal Medicine On-call Care Coordination Note    I was called by the ED physician because they recommended admission for this patient and we cover their PCP.  The history as I understand it after discussion with the ED physician is as follows:    Presents with chest pain, back pain, dizziness  Occult positive  Hx anemia, baseline hgb 10  Hgb in ED 7.2    I placed admission orders.  Including:    NPO  IV protonix  General surgery consult     Dr. Durham, or our coverage will see the patient tomorrow for H&P.    Electronically signed by SHERRI Fang CNP on 5/5/2024 at 3:32 AM

## 2024-05-05 NOTE — PROGRESS NOTES
Leslie Silvano was ordered Rimegepant sulfate (NURTEC) which is a nonformulary medication.  This medication will need to be supplied by the patient as the pharmacy does not carry this non-formulary medication.    If the medication has not been administered by 1400 on the following day from the time the order was placed, a pharmacist will follow-up with the nurse of the patient to assess the capability of the patient to bring in the medication.    If it is determined that the patient cannot supply the medication and it is not available to be dispensed from the pharmacy, the provider will be notified.  Manny Veloz RPH  5/5/2024  4:04 AM

## 2024-05-05 NOTE — ED NOTES
ED to Inpatient Handoff Report    Notified 4S that electronic handoff available and patient ready for transport to room 443.    Safety Risks: None identified    Patient in Restraints: no    Constant Observer or Patient : no    Telemetry Monitoring Ordered: Yes          Order to transfer to unit without monitor: NA    Last MEWS:  Time completed:     Deterioration Index: 17.44    Vitals:    05/04/24 2316 05/04/24 2319 05/05/24 0108 05/05/24 0315   BP:  (!) 132/100 127/83 116/79   Pulse:  (!) 104 100 87   Resp:  18 18 20   Temp:  98.6 °F (37 °C)     TempSrc:  Oral     SpO2:  100% 98% 97%   Weight: 90.7 kg (200 lb)      Height: 1.524 m (5')          Opportunity for questions and clarification was provided.

## 2024-05-05 NOTE — H&P
Internal Medicine History & Physical     Name: Leslie Schaefer  : 1990  Chief Complaint: Dizziness (Patient experiencing dizziness, hgb 7.4 yesterday,  Told to come to ER if symptomatic ), Chest Pain (Mid chest pain 8/10), and Back Pain  Primary Care Physician: Negin Partida DO  Admission date: 2024  Date of service: 2024     History of Present Illness  Leslie is a 33 y.o. year old female.  Patient states that over the last week she began having her menstrual period.  Patient states that she became increasingly weak and fatigued with some shortness of breath with activity.  She attributed it to being on her period and to change of work.  She states that she has had chest pain in the center of the chest 8/10, that has been heaviness that began on Thursday and worsened with activity.  Patient states that the pain in the chest resolved on Saturday on its own.  Did not radiate.  No syncope, collapse, leg swelling or leg pain.  Patient states that she also had low back pain across the tops of her pelvis.  She states this has been worsening over the last week as well.  She completed her menstrual period yesterday.  She follows with Dr. Kothari.  She denies any bloody stools, nausea, vomiting, vision or hearing changes, dysuria, hematuria, trouble chewing, swallowing.  States she has been eating okay.  She states she has been having increased fatigue and weakness over this last week as well with increased difficulty getting up.  Patient went to her doctor and had blood drawn and was found to have a hemoglobin of 7.4.  She was directed to the ER for further evaluation.    Patient is a good historian.  Son and  are at bedside.  All questions answered.      ED course:   Initial blood work and imaging studies performed. Admission recommended by ED physician. Case was discussed with ED provider. Meds in ED consisted of the following:  Medications   benzonatate (TESSALON) capsule 200 mg (has no

## 2024-05-05 NOTE — ED PROVIDER NOTES
limits   COVID-19, RAPID   INFLUENZA A + B, PCR   LIPASE   MAGNESIUM   TROPONIN   POC PREGNANCY UR-QUAL       As interpreted by me, the above displayed labs are abnormal. All other labs obtained during this visit were within normal range or not returned as of this dictation.      EKG Interpretation  Interpreted by emergency department physician, Thad Hurt DO    EKG  EKG interpreted by emergency physician  EKG shows sinus tachycardia 101 bpm.  Normal axis.  Normal QRS.  No STEMI.        RADIOLOGY:   Non-plain film images such as CT, Ultrasound and MRI are read by the radiologist. Plain radiographic images are visualized and preliminarily interpreted by the ED Provider with the below findings:    Chest x-ray showed no pneumonia.  No pneumothorax.    Interpretation per the Radiologist below, if available at the time of this note:    XR CHEST PORTABLE   Final Result   No evidence of active cardiopulmonary pathology.           XR CHEST PORTABLE    Result Date: 5/5/2024  EXAMINATION: ONE XRAY VIEW OF THE CHEST 5/5/2024 1:31 am COMPARISON: Chest series from January 2, 2022 HISTORY: ORDERING SYSTEM PROVIDED HISTORY: cp TECHNOLOGIST PROVIDED HISTORY: Reason for exam:->cp FINDINGS: Adequate and symmetric aeration of the lungs. There are no formed consolidations, pleural effusions, or pneumothoraces. Trachea and central mainstem bronchi appear clear. The cardiomediastinal silhouette and pulmonary vascularity appear within normal limits. Osseous and thoracic soft tissue structures demonstrate no acute findings.     No evidence of active cardiopulmonary pathology.       No results found.    PROCEDURES   Unless otherwise noted below, none          CRITICAL CARE TIME (.cct)       PAST MEDICAL HISTORY/Chronic Conditions Affecting Care      has a past medical history of Abnormal Pap smear of cervix, Anemia, Depression, Gestational diabetes, and Hypothyroidism.     EMERGENCY DEPARTMENT COURSE    Vitals:    Vitals:

## 2024-05-05 NOTE — PLAN OF CARE
Problem: Discharge Planning  Goal: Discharge to home or other facility with appropriate resources  Outcome: Progressing  Flowsheets (Taken 5/5/2024 3872)  Discharge to home or other facility with appropriate resources:   Identify barriers to discharge with patient and caregiver   Arrange for needed discharge resources and transportation as appropriate     Problem: Pain  Goal: Verbalizes/displays adequate comfort level or baseline comfort level  Outcome: Progressing     Problem: Safety - Adult  Goal: Free from fall injury  Outcome: Progressing     Problem: ABCDS Injury Assessment  Goal: Absence of physical injury  Outcome: Progressing

## 2024-05-06 VITALS
RESPIRATION RATE: 16 BRPM | BODY MASS INDEX: 40.8 KG/M2 | DIASTOLIC BLOOD PRESSURE: 92 MMHG | TEMPERATURE: 98.1 F | SYSTOLIC BLOOD PRESSURE: 129 MMHG | HEIGHT: 60 IN | OXYGEN SATURATION: 98 % | WEIGHT: 207.8 LBS | HEART RATE: 76 BPM

## 2024-05-06 LAB
ABO/RH: NORMAL
ALBUMIN SERPL-MCNC: 4.3 G/DL (ref 3.5–5.2)
ALP SERPL-CCNC: 55 U/L (ref 35–104)
ALT SERPL-CCNC: 21 U/L (ref 0–32)
ANA SER QL IA: NEGATIVE
ANION GAP SERPL CALCULATED.3IONS-SCNC: 8 MMOL/L (ref 7–16)
ANTIBODY SCREEN: NEGATIVE
ARM BAND NUMBER: NORMAL
AST SERPL-CCNC: 27 U/L (ref 0–31)
BASOPHILS # BLD: 0.19 K/UL (ref 0–0.2)
BASOPHILS NFR BLD: 3 % (ref 0–2)
BILIRUB SERPL-MCNC: 0.6 MG/DL (ref 0–1.2)
BLOOD BANK BLOOD PRODUCT EXPIRATION DATE: NORMAL
BLOOD BANK DISPENSE STATUS: NORMAL
BLOOD BANK ISBT PRODUCT BLOOD TYPE: 6200
BLOOD BANK PRODUCT CODE: NORMAL
BLOOD BANK SAMPLE EXPIRATION: NORMAL
BLOOD BANK UNIT TYPE AND RH: NORMAL
BPU ID: NORMAL
BUN SERPL-MCNC: 6 MG/DL (ref 6–20)
CALCIUM SERPL-MCNC: 8.5 MG/DL (ref 8.6–10.2)
CHLORIDE SERPL-SCNC: 105 MMOL/L (ref 98–107)
CO2 SERPL-SCNC: 25 MMOL/L (ref 22–29)
COMPONENT: NORMAL
CREAT SERPL-MCNC: 1 MG/DL (ref 0.5–1)
CROSSMATCH RESULT: NORMAL
EKG ATRIAL RATE: 101 BPM
EKG P AXIS: 48 DEGREES
EKG P-R INTERVAL: 140 MS
EKG Q-T INTERVAL: 340 MS
EKG QRS DURATION: 68 MS
EKG QTC CALCULATION (BAZETT): 440 MS
EKG R AXIS: 4 DEGREES
EKG T AXIS: 18 DEGREES
EKG VENTRICULAR RATE: 101 BPM
EOSINOPHIL # BLD: 0.13 K/UL (ref 0.05–0.5)
EOSINOPHILS RELATIVE PERCENT: 2 % (ref 0–6)
ERYTHROCYTE [DISTWIDTH] IN BLOOD BY AUTOMATED COUNT: 17.2 % (ref 11.5–15)
ERYTHROCYTE [DISTWIDTH] IN BLOOD BY AUTOMATED COUNT: 17.2 % (ref 11.5–15)
FOLATE SERPL-MCNC: 2.8 NG/ML (ref 4.8–24.2)
GFR, ESTIMATED: 81 ML/MIN/1.73M2
GLUCOSE SERPL-MCNC: 94 MG/DL (ref 74–99)
HCT VFR BLD AUTO: 26.5 % (ref 34–48)
HCT VFR BLD AUTO: 26.9 % (ref 34–48)
HGB BLD-MCNC: 8 G/DL (ref 11.5–15.5)
HGB BLD-MCNC: 8.1 G/DL (ref 11.5–15.5)
LYMPHOCYTES NFR BLD: 1.71 K/UL (ref 1.5–4)
LYMPHOCYTES RELATIVE PERCENT: 24 % (ref 20–42)
MCH RBC QN AUTO: 21.4 PG (ref 26–35)
MCHC RBC AUTO-ENTMCNC: 30.1 G/DL (ref 32–34.5)
MCHC RBC AUTO-ENTMCNC: 30.2 G/DL (ref 32–34.5)
MCV RBC AUTO: 70.9 FL (ref 80–99.9)
MCV RBC AUTO: 71.2 FL (ref 80–99.9)
METAMYELOCYTES ABSOLUTE COUNT: 0.06 K/UL (ref 0–0.12)
METAMYELOCYTES: 1 % (ref 0–1)
MONOCYTES NFR BLD: 0.06 K/UL (ref 0.1–0.95)
MONOCYTES NFR BLD: 1 % (ref 2–12)
MYELOCYTES ABSOLUTE COUNT: 0.32 K/UL
MYELOCYTES: 4 %
NEUTROPHILS NFR BLD: 66 % (ref 43–80)
NEUTS SEG NFR BLD: 4.74 K/UL (ref 1.8–7.3)
PATH REV BLD -IMP: NORMAL
PLATELET # BLD AUTO: 284 K/UL (ref 130–450)
PLATELET # BLD AUTO: 314 K/UL (ref 130–450)
PMV BLD AUTO: 9.8 FL (ref 7–12)
PMV BLD AUTO: 9.8 FL (ref 7–12)
POTASSIUM SERPL-SCNC: 3.6 MMOL/L (ref 3.5–5)
PROT SERPL-MCNC: 7.1 G/DL (ref 6.4–8.3)
RBC # BLD AUTO: 3.74 M/UL (ref 3.5–5.5)
RBC # BLD AUTO: 3.78 M/UL (ref 3.5–5.5)
RBC # BLD: ABNORMAL 10*6/UL
SODIUM SERPL-SCNC: 138 MMOL/L (ref 132–146)
TRANSFUSION STATUS: NORMAL
UNIT DIVISION: 0
UNIT ISSUE DATE/TIME: NORMAL
VIT B12 SERPL-MCNC: 311 PG/ML (ref 211–946)
WBC OTHER # BLD: 7.2 K/UL (ref 4.5–11.5)
WBC OTHER # BLD: 7.8 K/UL (ref 4.5–11.5)

## 2024-05-06 PROCEDURE — 93010 ELECTROCARDIOGRAM REPORT: CPT | Performed by: INTERNAL MEDICINE

## 2024-05-06 PROCEDURE — 82746 ASSAY OF FOLIC ACID SERUM: CPT

## 2024-05-06 PROCEDURE — 2580000003 HC RX 258: Performed by: NURSE PRACTITIONER

## 2024-05-06 PROCEDURE — 80053 COMPREHEN METABOLIC PANEL: CPT

## 2024-05-06 PROCEDURE — 82607 VITAMIN B-12: CPT

## 2024-05-06 PROCEDURE — 85025 COMPLETE CBC W/AUTO DIFF WBC: CPT

## 2024-05-06 PROCEDURE — 36415 COLL VENOUS BLD VENIPUNCTURE: CPT

## 2024-05-06 PROCEDURE — 6360000002 HC RX W HCPCS: Performed by: NURSE PRACTITIONER

## 2024-05-06 PROCEDURE — 85027 COMPLETE CBC AUTOMATED: CPT

## 2024-05-06 PROCEDURE — 2580000003 HC RX 258

## 2024-05-06 PROCEDURE — 6370000000 HC RX 637 (ALT 250 FOR IP): Performed by: NURSE PRACTITIONER

## 2024-05-06 PROCEDURE — C9113 INJ PANTOPRAZOLE SODIUM, VIA: HCPCS | Performed by: NURSE PRACTITIONER

## 2024-05-06 RX ORDER — LEVOTHYROXINE SODIUM 0.2 MG/1
200 TABLET ORAL DAILY
Qty: 30 TABLET | Refills: 0 | Status: SHIPPED | OUTPATIENT
Start: 2024-05-06

## 2024-05-06 RX ORDER — SODIUM CHLORIDE, SODIUM LACTATE, POTASSIUM CHLORIDE, CALCIUM CHLORIDE 600; 310; 30; 20 MG/100ML; MG/100ML; MG/100ML; MG/100ML
INJECTION, SOLUTION INTRAVENOUS CONTINUOUS
Status: DISCONTINUED | OUTPATIENT
Start: 2024-05-06 | End: 2024-05-06

## 2024-05-06 RX ADMIN — BUPROPION HYDROCHLORIDE 150 MG: 150 TABLET, EXTENDED RELEASE ORAL at 08:46

## 2024-05-06 RX ADMIN — SODIUM CHLORIDE, POTASSIUM CHLORIDE, SODIUM LACTATE AND CALCIUM CHLORIDE: 600; 310; 30; 20 INJECTION, SOLUTION INTRAVENOUS at 06:03

## 2024-05-06 RX ADMIN — VENLAFAXINE HYDROCHLORIDE 25 MG: 25 TABLET ORAL at 11:18

## 2024-05-06 RX ADMIN — SODIUM CHLORIDE, PRESERVATIVE FREE 10 ML: 5 INJECTION INTRAVENOUS at 08:48

## 2024-05-06 RX ADMIN — PANTOPRAZOLE SODIUM 40 MG: 40 INJECTION, POWDER, FOR SOLUTION INTRAVENOUS at 08:46

## 2024-05-06 RX ADMIN — VENLAFAXINE HYDROCHLORIDE 25 MG: 25 TABLET ORAL at 08:46

## 2024-05-06 RX ADMIN — LEVOTHYROXINE SODIUM 150 MCG: 75 TABLET ORAL at 06:00

## 2024-05-06 RX ADMIN — LIOTHYRONINE SODIUM 10 MCG: 5 TABLET ORAL at 08:46

## 2024-05-06 RX ADMIN — FERROUS GLUCONATE 324 MG: 324 TABLET ORAL at 08:46

## 2024-05-06 ASSESSMENT — PAIN SCALES - GENERAL
PAINLEVEL_OUTOF10: 0
PAINLEVEL_OUTOF10: 0

## 2024-05-06 NOTE — PROGRESS NOTES
PROGRESS NOTE       PATIENT PROBLEM LIST:  Patient Active Problem List   Diagnosis Code    Hashimoto's thyroiditis E06.3    Mixed hyperlipidemia E78.2    NORIS (generalized anxiety disorder) F41.1    Moderate episode of recurrent major depressive disorder (HCC) F33.1    Severe obesity (BMI 35.0-39.9) with comorbidity (HCC) E66.01    Absolute anemia D64.9    Chronic migraine without aura without status migrainosus, not intractable G43.709    Symptomatic anemia D64.9    Chest pain with low risk for cardiac etiology R07.9       SUBJECTIVE:  Leslie Silvano states ***    REVIEW OF SYSTEMS:  General ROS: negative for - fatigue, malaise,  weight gain or weight loss  Psychological ROS: negative for - anxiety , depression  Ophthalmic ROS: negative for - decreased vision or visual distortion.  ENT ROS: negative  Allergy and Immunology ROS: negative  Hematological and Lymphatic ROS: negative  Endocrine: no heat or cold intolerance and no polyphagia, polydipsia, or polyuria  Respiratory ROS: positive for - {:562049}  Cardiovascular ROS: positive for - {:360248}.  Gastrointestinal ROS: no abdominal pain, change in bowel habits, or black or bloody stools  Genito-Urinary ROS: no nocturia, dysuria, trouble voiding, frequency or hematuria  Musculoskeletal ROS: negative for- myalgias, arthralgias, or claudication  Neurological ROS: no TIA or stroke symptoms otherwise no significant change in symptoms or problems since yesterday as documented in previous progress notes.    SCHEDULED MEDICATIONS:   buPROPion  150 mg Oral QAM    venlafaxine  25 mg Oral TID WC    ferrous gluconate  324 mg Oral Daily with breakfast    levothyroxine  150 mcg Oral Daily    liothyronine  10 mcg Oral Daily    sodium chloride flush  10 mL IntraVENous 2 times per day    pantoprazole  40 mg IntraVENous BID       VITAL SIGNS:                                                                                                                          BP (!) 131/90   Pulse

## 2024-05-06 NOTE — DISCHARGE SUMMARY
(38 Fe) MG tablet  Commonly known as: FERGON  Take 1 tablet by mouth daily (with breakfast)     liothyronine 5 MCG tablet  Commonly known as: Cytomel  Take 2 tablets by mouth daily     LORazepam 0.5 MG tablet  Commonly known as: ATIVAN     Natural Vitamin D-3 125 MCG (5000 UT) Tabs tablet  Generic drug: vitamin D3  TAKE ONE TABLET BY MOUTH ONCE A DAY     rimegepant sulfate 75 MG Tbdp  Take 75 mg by mouth daily as needed (migraine)            STOP taking these medications      azithromycin 250 MG tablet  Commonly known as: Zithromax Z-Daron     benzonatate 200 MG capsule  Commonly known as: TESSALON               Where to Get Your Medications        These medications were sent to [id number] - [place], [place] - [address] - P [phone] - F [phone] [address], [facility] [address]      Phone: [phone]   levothyroxine 200 MCG tablet           INTERNAL MEDICINE INSTRUCTIONS:  Follow-up with primary care physician as directed in discharge paperwork.  Please review results of imaging studies with PCP.  Follow-up with consultants as directed by them.  If recurrence or worsening of symptoms go to the ED or call primary care physician.  Diet: ADULT DIET; Regular    FOLLOW-UP  No follow-up provider specified.    Preparing for this patient's discharge, including paperwork, orders, instructions, and meeting with patient did required 36 minutes.    Electronically signed by Tom Longoria MD on 5/6/2024 at 1:38 PM

## 2024-05-06 NOTE — CONSULTS
General Surgery Consult    Patient's Name/Date of Birth: Leslie Schaefer / 1990    Date: May 5, 2024     Consulting Surgeon: Tom Elliott M.D.    PCP: Negin Partida DO     Chief Complaint: FOBT+ stool    HPI:   Leslie Schaefer is a 33 y.o. female who presents for evaluation of fobt+ stool. Patient states that she has had chronic anemia with hgb level around 10. She takes iron at home. She felt tired and weak recently and asked her PCP to draw labs and was found to have hgb of 7. She is currently on her menstrual cycle. She states her stools have been normal brown but FOBT was positive. No history of endoscopy. No family history of colon cancer. No hematemesis or abdominal pain.      Past Medical History:   Diagnosis Date    Abnormal Pap smear of cervix     repeat without issue    Anemia     Depression     Gestational diabetes     Took metformin    Hypothyroidism        Past Surgical History:   Procedure Laterality Date     SECTION N/A 2019     SECTION performed by Alicia Castañeda DO at [facility] OR    TYMPANOSTOMY TUBE PLACEMENT      WISDOM TOOTH EXTRACTION         Current Facility-Administered Medications   Medication Dose Route Frequency Provider Last Rate Last Admin    benzonatate (TESSALON) capsule 200 mg  200 mg Oral TID PRN Sylvia Rivero APRN - CNP        buPROPion (WELLBUTRIN XL) extended release tablet 150 mg  150 mg Oral QAM Sylvia Rivero APRN - CNP   150 mg at 24 0753    venlafaxine (EFFEXOR) tablet 25 mg  25 mg Oral TID WC Sylvia Rivero APRN - CNP   25 mg at 24 0751    ferrous gluconate (FERGON) tablet 324 mg  324 mg Oral Daily with breakfast Sylvia Rivero APRN - CNP   324 mg at 24 0752    levothyroxine (SYNTHROID) tablet 150 mcg  150 mcg Oral Daily Sylvia Rivero APRN - CNP   150 mcg at 24 0743    liothyronine (CYTOMEL) tablet 10 mcg  10 mcg Oral Daily Sylvia Rivero APRN - CNP   10 mcg at 24 0753    rimegepant sulfate 
bleeding currently  Jose Cardenas MD  5/6/2024 7:52 AM     
uterine bleeding but also could be related to upper GI bleeding as well.  It is also my impression that she has had no exertional angina prior to this episode actually occurred at rest and thus may be simply a function of her profound anemia.  To her anemia and nuclear stress is relatively contraindicated presently but should her symptoms persist despite significant improvement of her hemoglobin level then certainly a coronary CT angiogram should be considered.  Will further observe and order diagnostic testing as reasonably indicated.  Obviously her thyroid status will need to be addressed as well in addition to other endocrine organs.    I have spent more than 5 minutes face to face with Leslie Schaefer,and reviewing notes and laboratory data with greater than 50% of this time instructing and counseling the patient regarding my findings and recommendations and I have answered all questions as posed to me by MsJoana Silvano. Thank you, Negin Partida DO for allowing me to consult in the care of this patient.    Adis Merchant DO , FACP, FACC, [facility]    NOTE:  This report was transcribed using voice recognition software.  Every effort was made to ensure accuracy; however, inadvertent computerized transcription errors may be present.

## 2024-05-06 NOTE — SIGNIFICANT EVENT
Hgb reviewed. Stable. Ok for outpatient endoscopies.     Please page with any questions or concerns.      F/u in chart.        Mamadou Garrett MD  PGY-5

## 2024-05-06 NOTE — PROGRESS NOTES
GENERAL SURGERY  DAILY PROGRESS NOTE  5/6/2024    Chief Complaint   Patient presents with    Dizziness     Patient experiencing dizziness, hgb 7.4 yesterday,  Told to come to ER if symptomatic     Chest Pain     Mid chest pain 8/10    Back Pain       Subjective:  No issues overnight. Denies any bloody stools. Denies any abdominal pain     Objective:  BP (!) 131/90   Pulse 81   Temp 98.5 °F (36.9 °C) (Oral)   Resp 16   Ht 1.524 m (5')   Wt 94.3 kg (207 lb 12.8 oz)   SpO2 97%   BMI 40.58 kg/m²     GENERAL:  non toxic appearing  HEAD: Normocephalic, atraumatic  EYES: No sclera icterus, pupils equal  LUNGS:  No increased work of breathing  CARDIOVASCULAR:  RR  ABDOMEN:  Soft, non-tender, non-distended  EXTREMITIES: No edema or swelling  SKIN: Warm and dry    Hgb 8.0 this AM    Assessment/Plan:  33 y.o. female with menorrhagia anemia, FOBT + stools      - rpt Hgb 1300   - if hgb stable ok for diet with plans for OP scopes     Electronically signed by Shauna Esteves MD on 5/6/2024 at 12:43 PM

## 2024-05-07 ENCOUNTER — CARE COORDINATION (OUTPATIENT)
Dept: OTHER | Facility: CLINIC | Age: 34
End: 2024-05-07

## 2024-05-07 NOTE — CARE COORDINATION
Care Transitions Outreach Attempt    Call within 2 business days of discharge: Yes   Attempted to reach patient for transitions of care follow up. Unable to reach patient.    Patient: Leslie Schaefer Patient : 1990 MRN: [id number]    Last Discharge Facility       Date Complaint Diagnosis Description Type Department Provider    24 Dizziness; Chest Pain; Back Pain Symptomatic anemia ... ED to Hosp-Admission (Discharged) (ADMITTED) Tom Smith MD; Ambrosio Hurt...              Was this an external facility discharge? No Discharge [facility] Name: [facility]    Noted following upcoming appointments from discharge chart review:   [facility] follow up appointment(s):   Future Appointments   Date Time Provider Department Center   2024  8:00 AM Negin Partida DO [facility]     Non-[facility]  follow up appointment(s): unknown    ACM attempted to reach patient for introduction to Associate Care Management related to admission for dizziness and chest pain. HIPAA compliant message left requesting a return phone call at patient convenience.     Plan for follow-up call in 1-2 days

## 2024-05-08 ENCOUNTER — CARE COORDINATION (OUTPATIENT)
Dept: OTHER | Facility: CLINIC | Age: 34
End: 2024-05-08

## 2024-05-08 ENCOUNTER — TELEPHONE (OUTPATIENT)
Dept: FAMILY MEDICINE CLINIC | Age: 34
End: 2024-05-08

## 2024-05-08 NOTE — TELEPHONE ENCOUNTER
Patient was in the hospital and discharged on 5/6. Per the patient, she does not need to follow up right now with Dr. Negin Partida.

## 2024-05-08 NOTE — CARE COORDINATION
Care Transitions Outreach Attempt    Call within 2 business days of discharge: Yes   Attempted to reach patient for transitions of care follow up. Unable to reach patient.    Patient: Leslie Schaefer Patient : 1990 MRN: [id number]    Last Discharge Facility       Date Complaint Diagnosis Description Type Department Provider    24 Dizziness; Chest Pain; Back Pain Symptomatic anemia ... ED to Hosp-Admission (Discharged) (ADMITTED) Tom Smith MD; Ambrosio Hurt...              Was this an external facility discharge? No Discharge Facility Name: [facility]    Noted following upcoming appointments from discharge chart review:   [facility] follow up appointment(s):   Future Appointments   Date Time Provider Department Center   2024  8:00 AM Negin Partida DO [facility]     Non-[facility]  follow up appointment(s): unknown    ACM attempted 2nd outreach to patient for introduction to Associate Care Management related to admission for dizziness and chest pain. HIPAA compliant message left requesting a return phone call at patient convenience.     Unable to Reach Letter sent to patient via [employer].    Will continue to outreach.

## 2024-05-14 ENCOUNTER — CARE COORDINATION (OUTPATIENT)
Dept: OTHER | Facility: CLINIC | Age: 34
End: 2024-05-14

## 2024-05-14 NOTE — CARE COORDINATION
Care Transitions Initial Follow Up Call    Call within 2 business days of discharge: Yes    Patient Current Location:  [place]    Care Transition Nurse contacted the patient by telephone to perform post hospital discharge assessment. Verified name and  with patient as identifiers. Provided introduction to self, and explanation of the Care Transition Nurse role.     Patient: Leslie Schaefer Patient : 1990   MRN: [id number]  Reason for Admission: Symptomatic anemia  Discharge Date: 24 RARS: Readmission Risk Score: 7.9      Last Discharge Facility       Date Complaint Diagnosis Description Type Department Provider    24 Dizziness; Chest Pain; Back Pain Symptomatic anemia ... ED to Hosp-Admission (Discharged) (ADMITTED) Tom Smith MD; Ambrosio Hurt...            Was this an external facility discharge? No Discharge Facility: [facility]    Challenges to be reviewed by the provider   Additional needs identified to be addressed with provider: No  none               Method of communication with provider: none.    ACM contacted patient for introduction to Associate Care Management related to admission for dizziness and chest pain. Verified name and  with patient as identifiers. Patient declines care management at this time, stating no needs.    Patient currently at work.  Offered patient to return call to this ACM at patients convenience.  Patient denies any needs at this time.  Patient states will follow up with Dr. Partida after scope. Per EMR, egd scheduled for 24.    ACM provided contact information for self referral if patient would need care management in the future. ACM will sign off at this time.     Follow Up  Future Appointments   Date Time Provider Department Center   2024  8:00 AM Negin Partida DO [facility]       Nikki Taylor RN

## 2024-05-20 ENCOUNTER — PREP FOR PROCEDURE (OUTPATIENT)
Dept: SURGERY | Age: 34
End: 2024-05-20

## 2024-05-20 RX ORDER — SODIUM CHLORIDE 9 MG/ML
INJECTION, SOLUTION INTRAVENOUS CONTINUOUS
Status: CANCELLED | OUTPATIENT
Start: 2024-05-20

## 2024-05-22 NOTE — PROGRESS NOTES
[facility] PRE-ADMISSION TESTING INSTRUCTIONS    The Preadmission Testing patient is instructed accordingly using the following criteria (check applicable):    ARRIVAL INSTRUCTIONS:  [x] Parking the day of Surgery is located in the Main Entrance lot.  Upon entering the door, make an immediate right to the surgery reception desk    [x] Bring photo ID and insurance card    [] Bring in a copy of Living will or Durable Power of  papers.    [x] Please be sure to arrange for responsible adult to provide transportation to and from the hospital    [x] Please arrange for responsible adult to be with you for the 24 hour period post procedure due to having anesthesia    [x] If you awake am of surgery not feeling well or have temperature >100 please call [phone]    GENERAL INSTRUCTIONS:    [x] May have clear liquids until 2 hours prior to surgery. Examples include water, fruit juices (no pulp), jello, popsicles, black coffee or tea, beef or chicken broth.       May have light meal 6 hours prior to surgery. Example include toast and clear liquids.        No gum, candy or mints.    [x] You may brush your teeth, but do not swallow any water    [x] Take medications as instructed with 1-2 oz of water    [x] Stop herbal supplements and vitamins 5 days prior to procedure    [x] Follow preop dosing of blood thinners per physician instructions    [] Take 1/2 dose of evening insulin, but no insulin after midnight    [] No oral diabetic medications after midnight    [] If diabetic and have low blood sugar or feel symptomatic, take 1-2oz apple juice only    [] Bring inhalers day of surgery    [] Bring C-PAP/ Bi-Pap day of surgery    [x] Bring urine specimen day of surgery    [x] Shower or bath with soap, lather and rinse well, AM of Surgery, no lotion, powders or creams to surgical site    [x] Follow bowel prep as instructed per surgeon    [x] No tobacco products within 24 hours of surgery     [x] No

## 2024-05-23 ENCOUNTER — ANESTHESIA EVENT (OUTPATIENT)
Dept: ENDOSCOPY | Age: 34
End: 2024-05-23
Payer: COMMERCIAL

## 2024-05-23 ASSESSMENT — LIFESTYLE VARIABLES: SMOKING_STATUS: 0

## 2024-05-23 NOTE — ANESTHESIA PRE PROCEDURE
Anesthesia Evaluation  Patient summary reviewed and Nursing notes reviewed   no history of anesthetic complications:   Airway: Mallampati: II  TM distance: >3 FB   Neck ROM: full     Dental:          Pulmonary: breath sounds clear to auscultation      (-) not a current smoker                           Cardiovascular:Negative CV ROS  Exercise tolerance: good (>4 METS)        ECG reviewed  Rhythm: regular  Rate: normal           Beta Blocker:  Not on Beta Blocker      ROS comment: EKG:  Sinus tachycardia  Nonspecific ST and T wave abnormality  Abnormal ECG  No previous ECGs available       Neuro/Psych:   (+) headaches: migraine headaches, psychiatric history:depression/anxiety             GI/Hepatic/Renal:   (+) bowel prep, morbid obesity (BMI 41 kg/m2)          Endo/Other:    (+) Diabetes (GDM managed with Metformin)Type II DM, hypothyroidism (Hashimoto's), blood dyscrasia: anemia:..          Pt had no PAT visit       Abdominal:             Vascular: negative vascular ROS.         Other Findings:             Anesthesia Plan      MAC     ASA 3     (Symptomatic anemia with suspected GI bleed)  Induction: intravenous.      Anesthetic plan and risks discussed with patient.      Plan discussed with CRNA.              Erwin Kenny DO   5/23/2024    History, data, and pertinent studies from chart review.  Above represents information available via the shared medical record including previous anesthetic, medication, and allergy history.  Confirmation of above and final disposition per DOS anesthesiologist.    Erwin Kenny DO  Staff Anesthesiologist  May 23, 2024  1:19 PM     DOS STAFF ADDENDUM:    Patient seen and examined, physical exam updated as needed, chart reviewed.    NPO status confirmed.    Anesthetic options and risks discussed with patient/legal guardian.  Patient/legal guardian verbalized understanding and agrees to proceed.     Fareed Montoya MD  Staff Anesthesiologist  May 24, 2024  6:35

## 2024-05-24 ENCOUNTER — ANESTHESIA (OUTPATIENT)
Dept: ENDOSCOPY | Age: 34
End: 2024-05-24
Payer: COMMERCIAL

## 2024-05-24 ENCOUNTER — HOSPITAL ENCOUNTER (OUTPATIENT)
Age: 34
Setting detail: OUTPATIENT SURGERY
Discharge: HOME | End: 2024-05-24
Attending: SURGERY | Admitting: SURGERY
Payer: COMMERCIAL

## 2024-05-24 VITALS
TEMPERATURE: 96.7 F | DIASTOLIC BLOOD PRESSURE: 65 MMHG | SYSTOLIC BLOOD PRESSURE: 113 MMHG | HEIGHT: 60 IN | WEIGHT: 209 LBS | BODY MASS INDEX: 41.03 KG/M2 | HEART RATE: 76 BPM | RESPIRATION RATE: 20 BRPM | OXYGEN SATURATION: 99 %

## 2024-05-24 LAB
CHP ED QC CHECK: NORMAL
GLUCOSE BLD-MCNC: NORMAL MG/DL

## 2024-05-24 PROCEDURE — 7100000011 HC PHASE II RECOVERY - ADDTL 15 MIN: Performed by: SURGERY

## 2024-05-24 PROCEDURE — 2580000003 HC RX 258: Performed by: SURGERY

## 2024-05-24 PROCEDURE — 2709999900 HC NON-CHARGEABLE SUPPLY: Performed by: SURGERY

## 2024-05-24 PROCEDURE — 6360000002 HC RX W HCPCS: Performed by: NURSE ANESTHETIST, CERTIFIED REGISTERED

## 2024-05-24 PROCEDURE — 3609017100 HC EGD: Performed by: SURGERY

## 2024-05-24 PROCEDURE — 3609027000 HC COLONOSCOPY: Performed by: SURGERY

## 2024-05-24 PROCEDURE — 3700000000 HC ANESTHESIA ATTENDED CARE: Performed by: SURGERY

## 2024-05-24 PROCEDURE — 3700000001 HC ADD 15 MINUTES (ANESTHESIA): Performed by: SURGERY

## 2024-05-24 PROCEDURE — 7100000010 HC PHASE II RECOVERY - FIRST 15 MIN: Performed by: SURGERY

## 2024-05-24 RX ORDER — PROPOFOL 10 MG/ML
INJECTION, EMULSION INTRAVENOUS PRN
Status: DISCONTINUED | OUTPATIENT
Start: 2024-05-24 | End: 2024-05-24 | Stop reason: SDUPTHER

## 2024-05-24 RX ORDER — SODIUM CHLORIDE 9 MG/ML
INJECTION, SOLUTION INTRAVENOUS CONTINUOUS
Status: DISCONTINUED | OUTPATIENT
Start: 2024-05-24 | End: 2024-05-24 | Stop reason: HOSPADM

## 2024-05-24 RX ADMIN — SODIUM CHLORIDE: 9 INJECTION, SOLUTION INTRAVENOUS at 06:51

## 2024-05-24 RX ADMIN — PROPOFOL 450 MG: 10 INJECTION, EMULSION INTRAVENOUS at 07:12

## 2024-05-24 ASSESSMENT — PAIN - FUNCTIONAL ASSESSMENT: PAIN_FUNCTIONAL_ASSESSMENT: 0-10

## 2024-05-24 ASSESSMENT — PAIN SCALES - GENERAL: PAINLEVEL_OUTOF10: 0

## 2024-05-24 NOTE — OP NOTE
Operative Note      Patient: Leslie Schaefer  YOB: 1990  MRN: [id number]    Date of Procedure: 5/24/2024    Pre-Op Diagnosis Codes:    FOBT+ stool    Anemia, unspecified type [D64.9]    Post-Op Diagnosis: Grossly normal EGD and colonoscopy except for small internal hemorrhoids       Procedure(s):  ESOPHAGOGASTRODUODENOSCOPY  COLONOSCOPY DIAGNOSTIC    Surgeon(s):  Tom Elliott MD    Assistant:   * No surgical staff found *    Anesthesia: Monitor Anesthesia Care    Estimated Blood Loss (mL): 0    Complications: none    Specimens:   * No specimens in log *    Implants:  * No implants in log *      Drains: * No LDAs found *    BRIEF HISTORY:  This is a 33 y.o. female who presents with FOBT+stool and chronic anemia.  It was recommended the patient undergo an esophagogastrduodenoscopy and colonoscopy for further evaluation.  The risks/benefits/alternatives/expected outcomes were explained the the patient which include but are not limited to, bleeding, perforation and aspiration. The patient understands and agrees to proceed.    PROCEDURE:  The patient was brought into the endoscopy suite and placed in the left lateral decubitus position.  A bite block was placed in the patients mouth.  After the initiation of LMAC anesthesia, an endoscope was inserted into the patient's mouth and passed into the esophagus and into the stomach.  The scope was advanced through the pylorus into the first and second portion of the duodenum making the note of mild duodenitis.  The scope was then withdrawn back into the stomach where it was retroflexed.  There was no hiatal hernia noted.  The scope was then straightened and stomach was visualized revealing normal mucosa. The stomach was desufflated. The scope was then withdrawn the entire length of the esophagus, making the note of a normal esophagus without masses, ulcerations, or lesions noted.  The scope was withdrawn entirely.  The patient tolerated the procedure well

## 2024-05-24 NOTE — ANESTHESIA POSTPROCEDURE EVALUATION
Department of Anesthesiology  Postprocedure Note    Patient: Leslie Schaefer  MRN: [id number]  YOB: 1990  Date of evaluation: 5/24/2024    Procedure Summary       Date: 05/24/24 Room / Location: [place] [address] / [facility]    Anesthesia Start: 0651 Anesthesia Stop: 0729    Procedures:       ESOPHAGOGASTRODUODENOSCOPY (Upper GI Region)      COLONOSCOPY DIAGNOSTIC Diagnosis:       Gastrointestinal hemorrhage, unspecified gastrointestinal hemorrhage type      Anemia, unspecified type      (Gastrointestinal hemorrhage, unspecified gastrointestinal hemorrhage type [K92.2])      (Anemia, unspecified type [D64.9])    Surgeons: Tom Elliott MD Responsible Provider: Fareed Montoya MD    Anesthesia Type: MAC ASA Status: 3            Anesthesia Type: No value filed.    Jairo Phase I: Jairo Score: 10    Jairo Phase II:      Anesthesia Post Evaluation    Patient location during evaluation: bedside  Patient participation: complete - patient participated  Level of consciousness: awake and alert  Pain score: 0  Airway patency: patent  Nausea & Vomiting: no nausea and no vomiting  Cardiovascular status: blood pressure returned to baseline  Respiratory status: acceptable  Hydration status: euvolemic  Pain management: adequate        No notable events documented.

## 2024-05-24 NOTE — H&P
General Surgery H&P    Patient's Name/Date of Birth: Leslie Schaefer / 1990    Date: May 24, 2024     Consulting Surgeon: Tom Elliott M.D.    PCP: Negin Partida DO     Chief Complaint: FOBT+ stool    HPI:   Leslie Schaefer is a 33 y.o. female who presents for evaluation of fobt+ stool. Patient states that she has had chronic anemia with hgb level around 10. She takes iron at home. She felt tired and weak recently and asked her PCP to draw labs and was found to have hgb of 7 on last admission. She states her stools have been normal brown but FOBT was positive. No history of endoscopy. No family history of colon cancer. No hematemesis or abdominal pain.      Past Medical History:   Diagnosis Date    Abnormal Pap smear of cervix     repeat without issue    Anemia     Depression     Gestational diabetes     Took metformin    Hypothyroidism        Past Surgical History:   Procedure Laterality Date     SECTION N/A 2019     SECTION performed by Alicia Castañeda DO at [facility] L&D OR    TYMPANOSTOMY TUBE PLACEMENT      WISDOM TOOTH EXTRACTION         Current Facility-Administered Medications   Medication Dose Route Frequency Provider Last Rate Last Admin    0.9 % sodium chloride infusion   IntraVENous Continuous Tom Elliott MD   New Bag at 24 0651       Allergies   Allergen Reactions    Avelox [Moxifloxacin] Swelling    Cefzil [Cefprozil] Swelling    Augmentin [Amoxicillin-Pot Clavulanate] Nausea And Vomiting     Also non narcotic pain medication       Family History   Problem Relation Age of Onset    Diabetes Father     Hypothyroidism Father     Rheum Arthritis Mother     Breast Cancer Paternal Aunt        Social History     Socioeconomic History    Marital status:      Spouse name: Not on file    Number of children: Not on file    Years of education: Not on file    Highest education level: Not on file   Occupational History    Not on file   Tobacco Use    Smoking  MD  5/24/2024  7:34 AM

## 2024-07-04 DIAGNOSIS — E55.9 VITAMIN D DEFICIENCY: ICD-10-CM

## 2024-07-05 NOTE — TELEPHONE ENCOUNTER
Last Appointment:  2/23/2024  Future Appointments   Date Time Provider Department Center   8/16/2024  8:00 AM Negin Partida DO [facility]   2/21/2025 11:30 AM Negin Partida DO [facility]

## 2024-07-06 RX ORDER — CHOLECALCIFEROL TAB 125 MCG (5000 UNIT) 125 MCG (5000 UT)
1 TAB DAILY
Qty: 90 TABLET | Refills: 1 | Status: SHIPPED | OUTPATIENT
Start: 2024-07-06

## 2024-09-16 ENCOUNTER — TELEPHONE (OUTPATIENT)
Dept: FAMILY MEDICINE CLINIC | Age: 34
End: 2024-09-16

## 2024-09-16 DIAGNOSIS — E55.9 VITAMIN D INSUFFICIENCY: ICD-10-CM

## 2024-09-16 DIAGNOSIS — E78.2 MIXED HYPERLIPIDEMIA: ICD-10-CM

## 2024-09-16 DIAGNOSIS — E06.3 HASHIMOTO'S THYROIDITIS: ICD-10-CM

## 2024-09-16 DIAGNOSIS — D50.8 OTHER IRON DEFICIENCY ANEMIA: ICD-10-CM

## 2024-09-16 DIAGNOSIS — R73.01 IMPAIRED FASTING GLUCOSE: ICD-10-CM

## 2024-09-16 DIAGNOSIS — Z00.01 ENCOUNTER FOR WELL ADULT EXAM WITH ABNORMAL FINDINGS: Primary | ICD-10-CM

## 2024-09-18 DIAGNOSIS — E06.3 HASHIMOTO'S THYROIDITIS: ICD-10-CM

## 2024-09-18 DIAGNOSIS — D50.8 OTHER IRON DEFICIENCY ANEMIA: ICD-10-CM

## 2024-09-18 DIAGNOSIS — F41.1 GAD (GENERALIZED ANXIETY DISORDER): ICD-10-CM

## 2024-09-18 DIAGNOSIS — F33.1 MODERATE EPISODE OF RECURRENT MAJOR DEPRESSIVE DISORDER (HCC): ICD-10-CM

## 2024-09-18 RX ORDER — DESVENLAFAXINE 50 MG/1
50 TABLET, FILM COATED, EXTENDED RELEASE ORAL DAILY
Qty: 90 TABLET | Refills: 1 | Status: SHIPPED | OUTPATIENT
Start: 2024-09-18

## 2024-09-18 RX ORDER — LIOTHYRONINE SODIUM 5 UG/1
TABLET ORAL
Qty: 180 TABLET | Refills: 1 | Status: SHIPPED | OUTPATIENT
Start: 2024-09-18

## 2024-09-18 RX ORDER — LEVOTHYROXINE SODIUM 150 UG/1
150 TABLET ORAL DAILY
Qty: 90 TABLET | Refills: 1 | OUTPATIENT
Start: 2024-09-18

## 2024-09-18 RX ORDER — BUPROPION HYDROCHLORIDE 150 MG/1
150 TABLET ORAL EVERY MORNING
Qty: 90 TABLET | Refills: 1 | Status: SHIPPED | OUTPATIENT
Start: 2024-09-18

## 2024-09-18 RX ORDER — FERROUS GLUCONATE 324(38)MG
TABLET ORAL
Qty: 90 TABLET | Refills: 1 | Status: SHIPPED | OUTPATIENT
Start: 2024-09-18

## 2024-09-23 ENCOUNTER — OFFICE VISIT (OUTPATIENT)
Dept: PRIMARY CARE CLINIC | Age: 34
End: 2024-09-23

## 2024-09-23 VITALS
SYSTOLIC BLOOD PRESSURE: 114 MMHG | HEART RATE: 114 BPM | BODY MASS INDEX: 41.39 KG/M2 | OXYGEN SATURATION: 98 % | WEIGHT: 210.8 LBS | TEMPERATURE: 98.1 F | HEIGHT: 60 IN | DIASTOLIC BLOOD PRESSURE: 80 MMHG

## 2024-09-23 DIAGNOSIS — J06.9 UPPER RESPIRATORY TRACT INFECTION, UNSPECIFIED TYPE: ICD-10-CM

## 2024-09-23 DIAGNOSIS — D50.8 OTHER IRON DEFICIENCY ANEMIA: ICD-10-CM

## 2024-09-23 DIAGNOSIS — Z00.01 ENCOUNTER FOR WELL ADULT EXAM WITH ABNORMAL FINDINGS: ICD-10-CM

## 2024-09-23 DIAGNOSIS — E55.9 VITAMIN D INSUFFICIENCY: ICD-10-CM

## 2024-09-23 DIAGNOSIS — R73.01 IMPAIRED FASTING GLUCOSE: ICD-10-CM

## 2024-09-23 DIAGNOSIS — E78.2 MIXED HYPERLIPIDEMIA: ICD-10-CM

## 2024-09-23 DIAGNOSIS — E06.3 HASHIMOTO'S THYROIDITIS: ICD-10-CM

## 2024-09-23 DIAGNOSIS — Z00.01 ENCOUNTER FOR WELL ADULT EXAM WITH ABNORMAL FINDINGS: Primary | ICD-10-CM

## 2024-09-23 RX ORDER — AZITHROMYCIN 250 MG/1
TABLET, FILM COATED ORAL
Qty: 6 TABLET | Refills: 0 | Status: SHIPPED | OUTPATIENT
Start: 2024-09-23 | End: 2024-09-28

## 2024-09-23 RX ORDER — METHYLPREDNISOLONE ACETATE 80 MG/ML
80 INJECTION, SUSPENSION INTRA-ARTICULAR; INTRALESIONAL; INTRAMUSCULAR; SOFT TISSUE ONCE
Status: COMPLETED | OUTPATIENT
Start: 2024-09-23 | End: 2024-09-23

## 2024-09-23 RX ADMIN — METHYLPREDNISOLONE ACETATE 80 MG: 80 INJECTION, SUSPENSION INTRA-ARTICULAR; INTRALESIONAL; INTRAMUSCULAR; SOFT TISSUE at 16:51

## 2024-09-23 ASSESSMENT — ENCOUNTER SYMPTOMS
RHINORRHEA: 0
VOMITING: 0
SINUS PAIN: 1
BACK PAIN: 0
ABDOMINAL PAIN: 0
COUGH: 0
SINUS PRESSURE: 1
NAUSEA: 0
DIARRHEA: 0
CONSTIPATION: 0
SORE THROAT: 0
EYE DISCHARGE: 0
WHEEZING: 0
SHORTNESS OF BREATH: 0

## 2024-09-25 ENCOUNTER — HOSPITAL ENCOUNTER (OUTPATIENT)
Age: 34
Discharge: HOME | End: 2024-09-25
Payer: COMMERCIAL

## 2024-09-25 LAB
25(OH)D3 SERPL-MCNC: 16.6 NG/ML (ref 30–100)
ALBUMIN SERPL-MCNC: 4.5 G/DL (ref 3.5–5.2)
ALP SERPL-CCNC: 88 U/L (ref 35–104)
ALT SERPL-CCNC: 25 U/L (ref 0–32)
ANION GAP SERPL CALCULATED.3IONS-SCNC: 14 MMOL/L (ref 7–16)
AST SERPL-CCNC: 44 U/L (ref 0–31)
BASOPHILS # BLD: 0.08 K/UL (ref 0–0.2)
BASOPHILS NFR BLD: 1 % (ref 0–2)
BILIRUB SERPL-MCNC: 0.5 MG/DL (ref 0–1.2)
BILIRUB UR QL STRIP: NEGATIVE
BUN SERPL-MCNC: 8 MG/DL (ref 6–20)
CALCIUM SERPL-MCNC: 9.1 MG/DL (ref 8.6–10.2)
CHLORIDE SERPL-SCNC: 103 MMOL/L (ref 98–107)
CHOLEST SERPL-MCNC: 198 MG/DL
CLARITY UR: CLEAR
CO2 SERPL-SCNC: 22 MMOL/L (ref 22–29)
COLOR UR: YELLOW
CREAT SERPL-MCNC: 0.7 MG/DL (ref 0.5–1)
EOSINOPHIL # BLD: 0.27 K/UL (ref 0.05–0.5)
EOSINOPHILS RELATIVE PERCENT: 3 % (ref 0–6)
ERYTHROCYTE [DISTWIDTH] IN BLOOD BY AUTOMATED COUNT: 16.6 % (ref 11.5–15)
FERRITIN SERPL-MCNC: 9 NG/ML
GFR, ESTIMATED: >90 ML/MIN/1.73M2
GLUCOSE SERPL-MCNC: 114 MG/DL (ref 74–99)
GLUCOSE UR STRIP-MCNC: NEGATIVE MG/DL
HBA1C MFR BLD: 5.1 % (ref 4–5.6)
HCT VFR BLD AUTO: 32.4 % (ref 34–48)
HDLC SERPL-MCNC: 41 MG/DL
HGB BLD-MCNC: 9.5 G/DL (ref 11.5–15.5)
HGB UR QL STRIP.AUTO: NEGATIVE
IMM GRANULOCYTES # BLD AUTO: 0.06 K/UL (ref 0–0.58)
IMM GRANULOCYTES NFR BLD: 1 % (ref 0–5)
IRON SATN MFR SERPL: 9 % (ref 15–50)
IRON SERPL-MCNC: 39 UG/DL (ref 37–145)
KETONES UR STRIP-MCNC: NEGATIVE MG/DL
LDLC SERPL CALC-MCNC: 124 MG/DL
LEUKOCYTE ESTERASE UR QL STRIP: ABNORMAL
LYMPHOCYTES NFR BLD: 1.13 K/UL (ref 1.5–4)
LYMPHOCYTES RELATIVE PERCENT: 13 % (ref 20–42)
MCH RBC QN AUTO: 21.8 PG (ref 26–35)
MCHC RBC AUTO-ENTMCNC: 29.3 G/DL (ref 32–34.5)
MCV RBC AUTO: 74.5 FL (ref 80–99.9)
MONOCYTES NFR BLD: 0.5 K/UL (ref 0.1–0.95)
MONOCYTES NFR BLD: 6 % (ref 2–12)
NEUTROPHILS NFR BLD: 77 % (ref 43–80)
NEUTS SEG NFR BLD: 6.68 K/UL (ref 1.8–7.3)
NITRITE UR QL STRIP: NEGATIVE
PH UR STRIP: 7 [PH] (ref 5–9)
PLATELET # BLD AUTO: 369 K/UL (ref 130–450)
PMV BLD AUTO: 9.9 FL (ref 7–12)
POTASSIUM SERPL-SCNC: 4.4 MMOL/L (ref 3.5–5)
PROT SERPL-MCNC: 8 G/DL (ref 6.4–8.3)
PROT UR STRIP-MCNC: NEGATIVE MG/DL
RBC # BLD AUTO: 4.35 M/UL (ref 3.5–5.5)
RBC #/AREA URNS HPF: NORMAL /HPF
SODIUM SERPL-SCNC: 139 MMOL/L (ref 132–146)
SP GR UR STRIP: 1.02 (ref 1–1.03)
T3FREE SERPL-MCNC: 2.28 PG/ML (ref 2–4.4)
T4 FREE SERPL-MCNC: 0.5 NG/DL (ref 0.9–1.7)
TIBC SERPL-MCNC: 432 UG/DL (ref 250–450)
TRIGL SERPL-MCNC: 166 MG/DL
TSH SERPL DL<=0.05 MIU/L-ACNC: 71.38 UIU/ML (ref 0.27–4.2)
UROBILINOGEN UR STRIP-ACNC: 0.2 EU/DL (ref 0–1)
VLDLC SERPL CALC-MCNC: 33 MG/DL
WBC #/AREA URNS HPF: NORMAL /HPF
WBC OTHER # BLD: 8.7 K/UL (ref 4.5–11.5)

## 2024-09-25 PROCEDURE — 86376 MICROSOMAL ANTIBODY EACH: CPT

## 2024-09-25 PROCEDURE — 84443 ASSAY THYROID STIM HORMONE: CPT

## 2024-09-25 PROCEDURE — 80061 LIPID PANEL: CPT

## 2024-09-25 PROCEDURE — 83036 HEMOGLOBIN GLYCOSYLATED A1C: CPT

## 2024-09-25 PROCEDURE — 81001 URINALYSIS AUTO W/SCOPE: CPT

## 2024-09-25 PROCEDURE — 80053 COMPREHEN METABOLIC PANEL: CPT

## 2024-09-25 PROCEDURE — 82728 ASSAY OF FERRITIN: CPT

## 2024-09-25 PROCEDURE — 82306 VITAMIN D 25 HYDROXY: CPT

## 2024-09-25 PROCEDURE — 85025 COMPLETE CBC W/AUTO DIFF WBC: CPT

## 2024-09-25 PROCEDURE — 83550 IRON BINDING TEST: CPT

## 2024-09-25 PROCEDURE — 83540 ASSAY OF IRON: CPT

## 2024-09-25 PROCEDURE — 84481 FREE ASSAY (FT-3): CPT

## 2024-09-25 PROCEDURE — 84439 ASSAY OF FREE THYROXINE: CPT

## 2024-09-25 PROCEDURE — 36415 COLL VENOUS BLD VENIPUNCTURE: CPT

## 2024-09-27 DIAGNOSIS — E06.3 HASHIMOTO'S THYROIDITIS: Primary | ICD-10-CM

## 2024-09-27 DIAGNOSIS — E55.9 VITAMIN D DEFICIENCY: ICD-10-CM

## 2024-09-27 DIAGNOSIS — D50.8 OTHER IRON DEFICIENCY ANEMIA: ICD-10-CM

## 2024-09-27 LAB — THYROPEROXIDASE AB SERPL IA-ACNC: 989 IU/ML (ref 0–25)

## 2024-10-08 RX ORDER — PREDNISONE 20 MG/1
20 TABLET ORAL 2 TIMES DAILY
Qty: 10 TABLET | Refills: 0 | Status: SHIPPED | OUTPATIENT
Start: 2024-10-08 | End: 2024-10-13

## 2024-10-10 DIAGNOSIS — R06.2 WHEEZE: Primary | ICD-10-CM

## 2024-11-22 DIAGNOSIS — F41.1 GAD (GENERALIZED ANXIETY DISORDER): ICD-10-CM

## 2024-11-22 RX ORDER — DESVENLAFAXINE 100 MG/1
100 TABLET, EXTENDED RELEASE ORAL DAILY
Qty: 30 TABLET | Refills: 5 | Status: SHIPPED | OUTPATIENT
Start: 2024-11-22

## 2024-12-26 DIAGNOSIS — E55.9 VITAMIN D DEFICIENCY: ICD-10-CM

## 2025-01-13 ENCOUNTER — OFFICE VISIT (OUTPATIENT)
Dept: FAMILY MEDICINE CLINIC | Age: 35
End: 2025-01-13
Payer: COMMERCIAL

## 2025-01-13 VITALS
HEART RATE: 118 BPM | WEIGHT: 209 LBS | HEIGHT: 60 IN | BODY MASS INDEX: 41.03 KG/M2 | OXYGEN SATURATION: 99 % | RESPIRATION RATE: 18 BRPM | TEMPERATURE: 99.8 F

## 2025-01-13 DIAGNOSIS — A08.4 VIRAL GASTROENTERITIS: Primary | ICD-10-CM

## 2025-01-13 PROCEDURE — 99213 OFFICE O/P EST LOW 20 MIN: CPT | Performed by: STUDENT IN AN ORGANIZED HEALTH CARE EDUCATION/TRAINING PROGRAM

## 2025-01-13 RX ORDER — ONDANSETRON 4 MG/1
4 TABLET, ORALLY DISINTEGRATING ORAL 2 TIMES DAILY PRN
Qty: 15 TABLET | Refills: 0 | Status: SHIPPED | OUTPATIENT
Start: 2025-01-13

## 2025-01-13 SDOH — ECONOMIC STABILITY: FOOD INSECURITY: WITHIN THE PAST 12 MONTHS, THE FOOD YOU BOUGHT JUST DIDN'T LAST AND YOU DIDN'T HAVE MONEY TO GET MORE.: NEVER TRUE

## 2025-01-13 SDOH — ECONOMIC STABILITY: FOOD INSECURITY: WITHIN THE PAST 12 MONTHS, YOU WORRIED THAT YOUR FOOD WOULD RUN OUT BEFORE YOU GOT MONEY TO BUY MORE.: NEVER TRUE

## 2025-01-13 ASSESSMENT — ENCOUNTER SYMPTOMS
VOMITING: 0
COUGH: 0
RHINORRHEA: 0
SHORTNESS OF BREATH: 0
NAUSEA: 1
ABDOMINAL PAIN: 0
DIARRHEA: 1

## 2025-01-13 ASSESSMENT — PATIENT HEALTH QUESTIONNAIRE - PHQ9
8. MOVING OR SPEAKING SO SLOWLY THAT OTHER PEOPLE COULD HAVE NOTICED. OR THE OPPOSITE, BEING SO FIGETY OR RESTLESS THAT YOU HAVE BEEN MOVING AROUND A LOT MORE THAN USUAL: NOT AT ALL
9. THOUGHTS THAT YOU WOULD BE BETTER OFF DEAD, OR OF HURTING YOURSELF: NOT AT ALL
2. FEELING DOWN, DEPRESSED OR HOPELESS: NOT AT ALL
7. TROUBLE CONCENTRATING ON THINGS, SUCH AS READING THE NEWSPAPER OR WATCHING TELEVISION: NOT AT ALL
SUM OF ALL RESPONSES TO PHQ QUESTIONS 1-9: 0
6. FEELING BAD ABOUT YOURSELF - OR THAT YOU ARE A FAILURE OR HAVE LET YOURSELF OR YOUR FAMILY DOWN: NOT AT ALL
SUM OF ALL RESPONSES TO PHQ QUESTIONS 1-9: 0
5. POOR APPETITE OR OVEREATING: NOT AT ALL
4. FEELING TIRED OR HAVING LITTLE ENERGY: NOT AT ALL
SUM OF ALL RESPONSES TO PHQ9 QUESTIONS 1 & 2: 0
10. IF YOU CHECKED OFF ANY PROBLEMS, HOW DIFFICULT HAVE THESE PROBLEMS MADE IT FOR YOU TO DO YOUR WORK, TAKE CARE OF THINGS AT HOME, OR GET ALONG WITH OTHER PEOPLE: NOT DIFFICULT AT ALL
SUM OF ALL RESPONSES TO PHQ QUESTIONS 1-9: 0
SUM OF ALL RESPONSES TO PHQ QUESTIONS 1-9: 0
3. TROUBLE FALLING OR STAYING ASLEEP: NOT AT ALL
1. LITTLE INTEREST OR PLEASURE IN DOING THINGS: NOT AT ALL

## 2025-01-13 NOTE — PROGRESS NOTES
[facility]  Dr. Mamadou Prater      Patient:  Leslie Schaefer 34 y.o. female     Date of Service: 1/13/25      Chief complaint:   Chief Complaint   Patient presents with    Nausea     Just started this morning     Diarrhea         History of Present Illness   The patient is a 34 y.o. female      History of Present Illness  The patient presents for evaluation of diarrhea, nausea, chills, and sweats.    She reports the onset of these symptoms approximately 40 minutes ago, which she suspects may be due to a recent exposure to a colleague who was ill last week. She has not experienced any episodes of vomiting but feels nauseous. She has consumed food today and is experiencing abdominal cramping. She does not report any dysuria or hematochezia.       Review of Systems:   Review of Systems   Constitutional:  Negative for chills and fever.   HENT:  Negative for congestion and rhinorrhea.    Respiratory:  Negative for cough and shortness of breath.    Cardiovascular:  Negative for chest pain and leg swelling.   Gastrointestinal:  Positive for diarrhea and nausea. Negative for abdominal pain and vomiting.   Genitourinary:  Negative for dysuria and hematuria.   Musculoskeletal:  Negative for arthralgias and myalgias.   Skin:  Negative for rash and wound.   Neurological:  Negative for dizziness and light-headedness.       Physical Exam   Vitals: Pulse (!) 118   Temp 99.8 °F (37.7 °C) (Temporal)   Resp 18   Ht 1.524 m (5')   Wt 94.8 kg (209 lb)   SpO2 99%   BMI 40.82 kg/m²   Physical Exam  Vitals reviewed.   Constitutional:       General: She is not in acute distress.  HENT:      Head: Normocephalic and atraumatic.   Eyes:      Extraocular Movements: Extraocular movements intact.      Conjunctiva/sclera: Conjunctivae normal.   Cardiovascular:      Rate and Rhythm: Normal rate and regular rhythm.   Pulmonary:      Effort: Pulmonary effort is normal.      Breath sounds: Normal breath sounds. No wheezing.

## 2025-02-21 ENCOUNTER — OFFICE VISIT (OUTPATIENT)
Dept: FAMILY MEDICINE CLINIC | Age: 35
End: 2025-02-21
Payer: COMMERCIAL

## 2025-02-21 VITALS
TEMPERATURE: 97.9 F | WEIGHT: 203 LBS | RESPIRATION RATE: 16 BRPM | OXYGEN SATURATION: 98 % | HEIGHT: 60 IN | BODY MASS INDEX: 39.85 KG/M2 | SYSTOLIC BLOOD PRESSURE: 126 MMHG | DIASTOLIC BLOOD PRESSURE: 76 MMHG | HEART RATE: 76 BPM

## 2025-02-21 DIAGNOSIS — E55.9 VITAMIN D DEFICIENCY: ICD-10-CM

## 2025-02-21 DIAGNOSIS — D50.8 OTHER IRON DEFICIENCY ANEMIA: ICD-10-CM

## 2025-02-21 DIAGNOSIS — F41.1 GAD (GENERALIZED ANXIETY DISORDER): ICD-10-CM

## 2025-02-21 DIAGNOSIS — E06.3 HASHIMOTO'S THYROIDITIS: Primary | ICD-10-CM

## 2025-02-21 DIAGNOSIS — E78.2 MIXED HYPERLIPIDEMIA: ICD-10-CM

## 2025-02-21 DIAGNOSIS — G43.709 CHRONIC MIGRAINE WITHOUT AURA WITHOUT STATUS MIGRAINOSUS, NOT INTRACTABLE: ICD-10-CM

## 2025-02-21 DIAGNOSIS — R73.01 IMPAIRED FASTING GLUCOSE: ICD-10-CM

## 2025-02-21 DIAGNOSIS — F33.1 MODERATE EPISODE OF RECURRENT MAJOR DEPRESSIVE DISORDER (HCC): ICD-10-CM

## 2025-02-21 PROCEDURE — 99214 OFFICE O/P EST MOD 30 MIN: CPT | Performed by: FAMILY MEDICINE

## 2025-02-21 RX ORDER — BUPROPION HYDROCHLORIDE 150 MG/1
150 TABLET ORAL EVERY MORNING
Qty: 90 TABLET | Refills: 3 | Status: SHIPPED | OUTPATIENT
Start: 2025-02-21

## 2025-02-21 RX ORDER — LIOTHYRONINE SODIUM 5 UG/1
10 TABLET ORAL DAILY
Qty: 180 TABLET | Refills: 3 | Status: SHIPPED | OUTPATIENT
Start: 2025-02-21

## 2025-02-21 RX ORDER — DESVENLAFAXINE 100 MG/1
100 TABLET, EXTENDED RELEASE ORAL DAILY
Qty: 90 TABLET | Refills: 3 | Status: SHIPPED | OUTPATIENT
Start: 2025-02-21

## 2025-02-21 RX ORDER — LEVOTHYROXINE SODIUM 200 UG/1
200 TABLET ORAL DAILY
Qty: 90 TABLET | Refills: 3 | Status: SHIPPED | OUTPATIENT
Start: 2025-02-21

## 2025-02-21 RX ORDER — FERROUS GLUCONATE 324(38)MG
324 TABLET ORAL
Qty: 90 TABLET | Refills: 3 | Status: SHIPPED | OUTPATIENT
Start: 2025-02-21

## 2025-02-21 NOTE — PROGRESS NOTES
Musculoskeletal:  Negative for arthralgias and back pain.   Skin:  Negative for rash.   Neurological:  Positive for headaches. Negative for dizziness.   Hematological:  Negative for adenopathy.   Psychiatric/Behavioral:  Positive for dysphoric mood and sleep disturbance. The patient is nervous/anxious.    All other systems reviewed and are negative.       Current Outpatient Medications on File Prior to Visit   Medication Sig Dispense Refill    ondansetron (ZOFRAN-ODT) 4 MG disintegrating tablet Take 1 tablet by mouth 2 times daily as needed for Nausea or Vomiting 15 tablet 0    LORazepam (ATIVAN) 0.5 MG tablet Take 1 tablet by mouth 2 times daily as needed for Anxiety.       No current facility-administered medications on file prior to visit.       Allergies   Allergen Reactions    Avelox [Moxifloxacin] Swelling    Cefzil [Cefprozil] Swelling    Augmentin [Amoxicillin-Pot Clavulanate] Nausea And Vomiting     Also non narcotic pain medication       Past Medical History:   Diagnosis Date    Abnormal Pap smear of cervix     repeat without issue    Anemia     Depression     Gestational diabetes     Took metformin    Hypothyroidism      Past Surgical History:   Procedure Laterality Date     SECTION N/A 2019     SECTION performed by Alicia Castañeda DO at [facility] L&D OR    COLONOSCOPY N/A 2024    COLONOSCOPY DIAGNOSTIC performed by Tom Elliott MD at [facility] ENDOSCOPY    TYMPANOSTOMY TUBE PLACEMENT      UPPER GASTROINTESTINAL ENDOSCOPY N/A 2024    ESOPHAGOGASTRODUODENOSCOPY performed by Tom Elliott MD at [facility] ENDOSCOPY    WISDOM TOOTH EXTRACTION       Family History   Problem Relation Age of Onset    Diabetes Father     Hypothyroidism Father     Rheum Arthritis Mother     Breast Cancer Paternal Aunt         Social History     Socioeconomic History    Marital status:      Spouse name: Not on file    Number of children: Not on file    Years of education: Not on file

## 2025-03-17 ENCOUNTER — OFFICE VISIT (OUTPATIENT)
Dept: ENDOCRINOLOGY | Age: 35
End: 2025-03-17
Payer: COMMERCIAL

## 2025-03-17 VITALS
BODY MASS INDEX: 42.8 KG/M2 | RESPIRATION RATE: 18 BRPM | DIASTOLIC BLOOD PRESSURE: 90 MMHG | TEMPERATURE: 98.4 F | WEIGHT: 218 LBS | HEART RATE: 84 BPM | SYSTOLIC BLOOD PRESSURE: 125 MMHG | OXYGEN SATURATION: 99 % | HEIGHT: 60 IN

## 2025-03-17 DIAGNOSIS — E06.3 HASHIMOTO'S THYROIDITIS: ICD-10-CM

## 2025-03-17 DIAGNOSIS — D50.8 OTHER IRON DEFICIENCY ANEMIA: ICD-10-CM

## 2025-03-17 DIAGNOSIS — E55.9 VITAMIN D DEFICIENCY: ICD-10-CM

## 2025-03-17 DIAGNOSIS — E03.9 HYPOTHYROIDISM, UNSPECIFIED TYPE: Primary | ICD-10-CM

## 2025-03-17 DIAGNOSIS — E03.9 HYPOTHYROIDISM, UNSPECIFIED TYPE: ICD-10-CM

## 2025-03-17 DIAGNOSIS — R73.01 IMPAIRED FASTING GLUCOSE: ICD-10-CM

## 2025-03-17 DIAGNOSIS — E78.2 MIXED HYPERLIPIDEMIA: ICD-10-CM

## 2025-03-17 PROCEDURE — 99214 OFFICE O/P EST MOD 30 MIN: CPT | Performed by: INTERNAL MEDICINE

## 2025-03-17 PROCEDURE — G2211 COMPLEX E/M VISIT ADD ON: HCPCS | Performed by: INTERNAL MEDICINE

## 2025-03-17 NOTE — PROGRESS NOTES
and personally interpreted labs associated with the above diagnosis. I also ordered labs to further assess and manage the above addressed medical conditions.     Return in about 4 weeks (around 4/14/2025) for hypothyroidism, VitD deficiency.    The above issues were reviewed with the patient who understood and agreed with the plan.    Thank you for allowing us to participate in the care of this patient. Please do not hesitate to contact us with any additional questions.      Diagnosis Orders   1. Hypothyroidism, unspecified type  TSH    T4, Free    T4    T4    T4, Free    TSH      2. Vitamin D deficiency            Simón Muniz MD  Endocrinologist, [facility]   [address] [facility], [facility]  [facility] [address]  [phone]   ------------------------------  An electronic signature was used to authenticate this note. Simón Muniz MD on 3/17/2025 at 11:00 AM

## 2025-03-18 LAB
T4 FREE: 0.6 NG/DL (ref 0.9–1.7)
THYROXINE (T4): 4.9 UG/DL (ref 4.5–11.7)
TSH SERPL DL<=0.05 MIU/L-ACNC: 25.82 UIU/ML (ref 0.27–4.2)

## 2025-03-20 LAB
THYROGLOBULIN AB: 72 IU/ML (ref 0–40)
THYROID PEROXIDASE (TPO) AB: 290 IU/ML (ref 0–25)

## 2025-03-23 ENCOUNTER — RESULTS FOLLOW-UP (OUTPATIENT)
Dept: ENDOCRINOLOGY | Age: 35
End: 2025-03-23

## 2025-03-23 ENCOUNTER — RESULTS FOLLOW-UP (OUTPATIENT)
Dept: FAMILY MEDICINE CLINIC | Age: 35
End: 2025-03-23

## 2025-06-11 ENCOUNTER — TELEPHONE (OUTPATIENT)
Dept: PRIMARY CARE CLINIC | Age: 35
End: 2025-06-11

## 2025-06-11 DIAGNOSIS — Z00.01 ENCOUNTER FOR WELL ADULT EXAM WITH ABNORMAL FINDINGS: Primary | ICD-10-CM

## 2025-07-26 ENCOUNTER — HOSPITAL ENCOUNTER (OUTPATIENT)
Age: 35
Discharge: HOME | End: 2025-07-26
Payer: COMMERCIAL

## 2025-07-26 DIAGNOSIS — Z00.01 ENCOUNTER FOR WELL ADULT EXAM WITH ABNORMAL FINDINGS: ICD-10-CM

## 2025-07-26 LAB
25(OH)D3 SERPL-MCNC: 16 NG/ML (ref 30–100)
ALBUMIN SERPL-MCNC: 4.5 G/DL (ref 3.5–5.2)
ALP SERPL-CCNC: 79 U/L (ref 35–104)
ALT SERPL-CCNC: 44 U/L (ref 0–35)
ANION GAP SERPL CALCULATED.3IONS-SCNC: 11 MMOL/L (ref 7–16)
AST SERPL-CCNC: 52 U/L (ref 0–35)
BASOPHILS # BLD: 0.06 K/UL (ref 0–0.2)
BASOPHILS NFR BLD: 1 % (ref 0–2)
BILIRUB SERPL-MCNC: 0.4 MG/DL (ref 0–1.2)
BUN SERPL-MCNC: 6 MG/DL (ref 6–20)
CALCIUM SERPL-MCNC: 9.3 MG/DL (ref 8.6–10)
CHLORIDE SERPL-SCNC: 104 MMOL/L (ref 98–107)
CHOLEST SERPL-MCNC: 185 MG/DL
CO2 SERPL-SCNC: 25 MMOL/L (ref 22–29)
CREAT SERPL-MCNC: 0.8 MG/DL (ref 0.5–1)
EOSINOPHIL # BLD: 0.33 K/UL (ref 0.05–0.5)
EOSINOPHILS RELATIVE PERCENT: 4 % (ref 0–6)
ERYTHROCYTE [DISTWIDTH] IN BLOOD BY AUTOMATED COUNT: 16.9 % (ref 11.5–15)
GFR, ESTIMATED: >90 ML/MIN/1.73M2
GLUCOSE SERPL-MCNC: 91 MG/DL (ref 74–99)
HCT VFR BLD AUTO: 29.7 % (ref 34–48)
HDLC SERPL-MCNC: 32 MG/DL
HGB BLD-MCNC: 8.7 G/DL (ref 11.5–15.5)
IMM GRANULOCYTES # BLD AUTO: 0.09 K/UL (ref 0–0.58)
IMM GRANULOCYTES NFR BLD: 1 % (ref 0–5)
IRON SATN MFR SERPL: 4 % (ref 15–50)
IRON SERPL-MCNC: 18 UG/DL (ref 37–145)
LDLC SERPL CALC-MCNC: 117 MG/DL
LYMPHOCYTES NFR BLD: 2.16 K/UL (ref 1.5–4)
LYMPHOCYTES RELATIVE PERCENT: 26 % (ref 20–42)
MCH RBC QN AUTO: 20.7 PG (ref 26–35)
MCHC RBC AUTO-ENTMCNC: 29.3 G/DL (ref 32–34.5)
MCV RBC AUTO: 70.5 FL (ref 80–99.9)
MONOCYTES NFR BLD: 0.43 K/UL (ref 0.1–0.95)
MONOCYTES NFR BLD: 5 % (ref 2–12)
NEUTROPHILS NFR BLD: 63 % (ref 43–80)
NEUTS SEG NFR BLD: 5.29 K/UL (ref 1.8–7.3)
PLATELET # BLD AUTO: 408 K/UL (ref 130–450)
PMV BLD AUTO: 9.7 FL (ref 7–12)
POTASSIUM SERPL-SCNC: 3.9 MMOL/L (ref 3.5–5.1)
PROT SERPL-MCNC: 7.9 G/DL (ref 6.4–8.3)
RBC # BLD AUTO: 4.21 M/UL (ref 3.5–5.5)
SODIUM SERPL-SCNC: 139 MMOL/L (ref 136–145)
TIBC SERPL-MCNC: 405 UG/DL (ref 250–450)
TRIGL SERPL-MCNC: 184 MG/DL
TSH SERPL DL<=0.05 MIU/L-ACNC: 68.8 UIU/ML (ref 0.27–4.2)
VLDLC SERPL CALC-MCNC: 37 MG/DL
WBC OTHER # BLD: 8.4 K/UL (ref 4.5–11.5)

## 2025-07-26 PROCEDURE — 83550 IRON BINDING TEST: CPT

## 2025-07-26 PROCEDURE — 36415 COLL VENOUS BLD VENIPUNCTURE: CPT

## 2025-07-26 PROCEDURE — 84443 ASSAY THYROID STIM HORMONE: CPT

## 2025-07-26 PROCEDURE — 83540 ASSAY OF IRON: CPT

## 2025-07-26 PROCEDURE — 85025 COMPLETE CBC W/AUTO DIFF WBC: CPT

## 2025-07-26 PROCEDURE — 82306 VITAMIN D 25 HYDROXY: CPT

## 2025-07-26 PROCEDURE — 80061 LIPID PANEL: CPT

## 2025-07-26 PROCEDURE — 80053 COMPREHEN METABOLIC PANEL: CPT

## 2025-08-21 ENCOUNTER — OFFICE VISIT (OUTPATIENT)
Dept: FAMILY MEDICINE CLINIC | Age: 35
End: 2025-08-21
Payer: COMMERCIAL

## 2025-08-21 VITALS
WEIGHT: 220 LBS | BODY MASS INDEX: 43.19 KG/M2 | OXYGEN SATURATION: 99 % | HEIGHT: 60 IN | TEMPERATURE: 98.2 F | DIASTOLIC BLOOD PRESSURE: 94 MMHG | SYSTOLIC BLOOD PRESSURE: 130 MMHG | RESPIRATION RATE: 16 BRPM | HEART RATE: 101 BPM

## 2025-08-21 DIAGNOSIS — D50.9 MICROCYTIC ANEMIA: ICD-10-CM

## 2025-08-21 DIAGNOSIS — E55.9 VITAMIN D DEFICIENCY: Primary | ICD-10-CM

## 2025-08-21 PROCEDURE — 99214 OFFICE O/P EST MOD 30 MIN: CPT | Performed by: FAMILY MEDICINE

## 2025-08-21 RX ORDER — ERGOCALCIFEROL 1.25 MG/1
50000 CAPSULE, LIQUID FILLED ORAL WEEKLY
Qty: 12 CAPSULE | Refills: 1 | Status: SHIPPED | OUTPATIENT
Start: 2025-08-21

## 2025-08-22 ENCOUNTER — TELEPHONE (OUTPATIENT)
Dept: FAMILY MEDICINE CLINIC | Age: 35
End: 2025-08-22

## 2025-09-03 ASSESSMENT — ENCOUNTER SYMPTOMS
FACIAL SWELLING: 0
CONSTIPATION: 0
DIARRHEA: 0
VOMITING: 0
CHEST TIGHTNESS: 0
PHOTOPHOBIA: 0
ABDOMINAL DISTENTION: 0
SHORTNESS OF BREATH: 0
APNEA: 0
SINUS PRESSURE: 0
COLOR CHANGE: 0
RHINORRHEA: 0
SINUS PAIN: 0
COUGH: 0
BLOOD IN STOOL: 0

## (undated) DEVICE — 3000CC GUARDIAN II: Brand: GUARDIAN

## (undated) DEVICE — SUTURE 0 L36IN ABSRB BRN CT L40MM 1/2 CIR TAPERPOINT SGL 914H

## (undated) DEVICE — GRADUATE TRIANG MEASURE 1000ML BLK PRNT

## (undated) DEVICE — SYSTEM TISS RETEN TRS

## (undated) DEVICE — GOWN,SIRUS,FABRNF,L,20/CS: Brand: MEDLINE

## (undated) DEVICE — HYPODERMIC SAFETY NEEDLE: Brand: MAGELLAN

## (undated) DEVICE — GLOVE SURG SZ 6 THK91MIL LTX FREE SYN POLYISOPRENE ANTI

## (undated) DEVICE — Device: Brand: PORTEX

## (undated) DEVICE — SUTURE STRATAFIX SYMMETRIC SZ 1 L18IN ABSRB VLT CT1 L36CM SXPP1A404

## (undated) DEVICE — COVER,LIGHT HANDLE,FLX,2/PK: Brand: MEDLINE INDUSTRIES, INC.

## (undated) DEVICE — TOWEL,OR,DSP,ST,BLUE,STD,6/PK,12PK/CS: Brand: MEDLINE

## (undated) DEVICE — STRIP SKIN CLSR W1XL5IN NYL REINF CURAD

## (undated) DEVICE — CONTAINER SPEC 64OZ POLYPR PATH SNAP LOK CAP W/ LID

## (undated) DEVICE — SHEET,DRAPE,53X77,STERILE: Brand: MEDLINE

## (undated) DEVICE — BLOCK BITE 60FR RUBBER ADLT DENTAL

## (undated) DEVICE — TELFA ADHESIVE ISLAND DRESSING: Brand: TELFA

## (undated) DEVICE — CESAREAN BIRTH PACK II: Brand: MEDLINE INDUSTRIES, INC.

## (undated) DEVICE — SPONGE LAP W18XL18IN WHT COT 4 PLY FLD STRUNG RADPQ DISP ST

## (undated) DEVICE — DRESSING FOAM W4XL10IN POLYUR SAFETAC MULTILAYERED ABSRB PD

## (undated) DEVICE — PENCIL ES L3M BTTN SWCH HOLSTER W/ BLDE ELECTRD EDGE

## (undated) DEVICE — APPLICATOR PREP 26ML 0.7% IOD POVACRYLEX 74% ISO ALC ST

## (undated) DEVICE — SINGLE-USE BIOPSY FORCEPS: Brand: RADIAL JAW 4

## (undated) DEVICE — AGENT HEMSTAT W4XL4IN OXIDIZED REGENERATED CELOS STRUCTURED

## (undated) DEVICE — TUBE BLD COLLECT ST 1 SIL COAT 7ML 10ML

## (undated) DEVICE — COUNTER NDL 30 COUNT DBL MAG

## (undated) DEVICE — MEDI-VAC YANKAUER SUCTION HANDLE W/BULBOUS TIP: Brand: CARDINAL HEALTH

## (undated) DEVICE — PEN: MARKING STD 100/CS: Brand: MEDICAL ACTION INDUSTRIES

## (undated) DEVICE — CATHETERIZATION KIT FOL16 FR 2000 CC DRAINAGE BG LUBRICATH

## (undated) DEVICE — SUTURE VCRL + SZ 4-0 L27IN ABSRB UD PS-2 3/8 CIR REV CUT VCP426H

## (undated) DEVICE — TUBING, SUCTION, 3/16" X 12', STRAIGHT: Brand: MEDLINE

## (undated) DEVICE — BLADE SURG NO20 S STL STR DISP GLASSVAN

## (undated) DEVICE — CONTAINER,SPEC,PNEUM TUBE,3OZ,STRL PATH: Brand: MEDLINE

## (undated) DEVICE — SPONGE GZ W4XL4IN RAYON POLY CVR W/NONWOVEN FAB STRL 2/PK

## (undated) DEVICE — ELECTRODE PT RET AD L9FT HI MOIST COND ADH HYDRGEL CORDED

## (undated) DEVICE — GLOVE SURG SZ 65 L12IN FNGR THK83MIL CRM POLYISOPRENE